# Patient Record
Sex: FEMALE | Race: WHITE | NOT HISPANIC OR LATINO | Employment: UNEMPLOYED | URBAN - NONMETROPOLITAN AREA
[De-identification: names, ages, dates, MRNs, and addresses within clinical notes are randomized per-mention and may not be internally consistent; named-entity substitution may affect disease eponyms.]

---

## 2017-03-20 ENCOUNTER — OFFICE VISIT (OUTPATIENT)
Dept: CARDIOLOGY | Facility: CLINIC | Age: 77
End: 2017-03-20

## 2017-03-20 VITALS
BODY MASS INDEX: 33.64 KG/M2 | SYSTOLIC BLOOD PRESSURE: 136 MMHG | HEIGHT: 62 IN | DIASTOLIC BLOOD PRESSURE: 86 MMHG | WEIGHT: 182.8 LBS | OXYGEN SATURATION: 94 % | HEART RATE: 66 BPM

## 2017-03-20 DIAGNOSIS — E11.9 TYPE 2 DIABETES MELLITUS WITHOUT COMPLICATION, WITHOUT LONG-TERM CURRENT USE OF INSULIN (HCC): ICD-10-CM

## 2017-03-20 DIAGNOSIS — E55.9 VITAMIN D DEFICIENCY: ICD-10-CM

## 2017-03-20 DIAGNOSIS — E21.3 HYPERPARATHYROIDISM (HCC): ICD-10-CM

## 2017-03-20 DIAGNOSIS — E78.2 MIXED HYPERLIPIDEMIA: Primary | ICD-10-CM

## 2017-03-20 DIAGNOSIS — F03.90 SENILE DEMENTIA, WITHOUT BEHAVIORAL DISTURBANCE (HCC): ICD-10-CM

## 2017-03-20 DIAGNOSIS — E66.9 NON MORBID OBESITY, UNSPECIFIED OBESITY TYPE: ICD-10-CM

## 2017-03-20 DIAGNOSIS — Z12.11 COLON CANCER SCREENING: ICD-10-CM

## 2017-03-20 PROCEDURE — 99213 OFFICE O/P EST LOW 20 MIN: CPT | Performed by: INTERNAL MEDICINE

## 2017-03-20 RX ORDER — LEVOTHYROXINE SODIUM 0.03 MG/1
25 TABLET ORAL DAILY
Qty: 90 TABLET | Refills: 2 | Status: SHIPPED | OUTPATIENT
Start: 2017-03-20 | End: 2021-12-17

## 2017-03-20 RX ORDER — ATORVASTATIN CALCIUM 80 MG/1
40 TABLET, FILM COATED ORAL DAILY
Qty: 90 TABLET | Refills: 2 | Status: SHIPPED | OUTPATIENT
Start: 2017-03-20 | End: 2021-12-17

## 2017-03-20 RX ORDER — IBUPROFEN/PSEUDOEPHEDRINE HCL 200MG-30MG
1 TABLET ORAL NIGHTLY
COMMUNITY
End: 2021-12-17

## 2017-03-20 RX ORDER — ESCITALOPRAM OXALATE 10 MG/1
10 TABLET ORAL DAILY
Qty: 90 TABLET | Refills: 0 | Status: SHIPPED | OUTPATIENT
Start: 2017-03-20 | End: 2017-03-28

## 2017-03-20 NOTE — PROGRESS NOTES
"subjective     Chief Complaint   Patient presents with   • Insomnia     \"at times days at a time\"   • Hyperlipidemia   • Diabetes   • left hip pain     pt fell 7 days ago      History of Present Illness  Hyperlipidemia  Luz Beasley has long-standing history of hyperlipidemia. Has been trying to lose weight and trying to follow diet and activity recommandations. Patient is tolerating medications very well. There has been no side effects. Latest lipid levels have been fluctuating.    Diabetes mellitus  Patient is eating more appetite is significantly better. Cardiac the family she is constantly eating. She is not following diet very well.  Sugar is running high around 150.     Weakness and fatigue  Patient complains of being tiredness and fatigue.  Family feels that she was recently started on a new psychiatric medications Brilinta 5 mg daily.  They feel the Zoloft was better than 1 to go back on it however they were advised to increase Brilinta 10 mg daily.    Hypertension  Last visit patient had low blood pressure.  Blood pressure medications were discontinued now blood pressure is very good is running around 1:30 to 140 systolic.    Dementia  Patient has senile dementia and confusion.  She is taking Namenda xr 28 and Aricept 10 mg daily.  There has been no significant change.  Next visit will change to namzaric     Patient Active Problem List   Diagnosis   • Diabetes mellitus*   • Hyperlipemia*   • Senile dementia*   • Vitamin D deficiency*   • Obesity*   • Hyperparathyroidism status post parathyroidectomy*       Social History   Substance Use Topics   • Smoking status: Never Smoker   • Smokeless tobacco: Never Used   • Alcohol use No       No Known Allergies      Current Outpatient Prescriptions:   •  atorvastatin (LIPITOR) 80 MG tablet, Take 0.5 tablets by mouth Daily., Disp: 90 tablet, Rfl: 2  •  clonazePAM (KlonoPIN) 0.5 MG tablet, take 1 tablet by mouth twice a day if needed, Disp: 60 tablet, Rfl: 0  •  " "donepezil (ARICEPT) 10 MG tablet, Take 10 mg by mouth every night., Disp: , Rfl:   •  escitalopram (LEXAPRO) 10 MG tablet, Take 1 tablet by mouth Daily., Disp: 90 tablet, Rfl: 0  •  levothyroxine (LEVOTHROID) 25 MCG tablet, Take 1 tablet by mouth Daily., Disp: 90 tablet, Rfl: 2  •  Melatonin 3 MG tablet dispersible, Take  by mouth Every Night., Disp: , Rfl:   •  memantine (NAMENDA XR) 28 MG capsule sustained-release 24 hr extended release capsule, Take 28 mg by mouth daily., Disp: , Rfl:   •  metFORMIN (GLUCOPHAGE) 500 MG tablet, Take 0.5 tablets by mouth Daily With Breakfast., Disp: 45 tablet, Rfl: 2  •  Nystatin powder, 1 application 3 (Three) Times a Day., Disp: 1 bottle, Rfl: 2  •  Vortioxetine HBr 10 MG tablet, Take 1/2 tab by mouth daily, Disp: 15 tablet, Rfl: 0      The following portions of the patient's history were reviewed and updated as appropriate: allergies, current medications, past family history, past medical history, past social history, past surgical history and problem list.    Review of Systems   Constitution: Positive for weakness and malaise/fatigue.   Eyes: Negative.    Cardiovascular: Negative.    Respiratory: Negative.    Skin: Negative.    Musculoskeletal: Positive for muscle weakness.   Gastrointestinal: Negative.    Genitourinary: Negative.    Neurological: Positive for dizziness.   Psychiatric/Behavioral: Positive for hallucinations. The patient has insomnia and is nervous/anxious.           Objective:     Visit Vitals   • /86 (BP Location: Left arm, Patient Position: Sitting)   • Pulse 66   • Ht 62\" (157.5 cm)   • Wt 182 lb 12.8 oz (82.9 kg)   • SpO2 94%   • BMI 33.43 kg/m2     Physical Exam   Constitutional: She appears well-developed and well-nourished.   HENT:   Head: Normocephalic and atraumatic.   Mouth/Throat: Oropharynx is clear and moist.   Eyes: Conjunctivae and EOM are normal. Pupils are equal, round, and reactive to light. No scleral icterus.   Neck: Normal range of " motion. Neck supple. No JVD present. No tracheal deviation present. No thyromegaly present.   Cardiovascular: Normal rate, regular rhythm, normal heart sounds and intact distal pulses.  Exam reveals no friction rub.    No murmur heard.  Pulmonary/Chest: Effort normal and breath sounds normal. No respiratory distress. She has no wheezes. She has no rales. She exhibits no tenderness.   Abdominal: Soft. Bowel sounds are normal. She exhibits no distension and no mass. There is no tenderness. There is no rebound and no guarding.   Musculoskeletal: Normal range of motion. She exhibits no edema, tenderness or deformity.   Lymphadenopathy:     She has no cervical adenopathy.   Neurological: She is alert. She has normal reflexes. No cranial nerve deficit. She exhibits normal muscle tone. Coordination normal.   Skin: Skin is warm and dry.   Psychiatric: She has a normal mood and affect. Her behavior is normal. Judgment and thought content normal.         Lab Review  Lab Results   Component Value Date     12/20/2016    K 3.9 12/20/2016     12/20/2016    BUN 20 12/20/2016    CREATININE 1.07 12/20/2016    GLUCOSE 158 (H) 12/20/2016    CALCIUM 9.5 12/20/2016    ALT 14 11/22/2016    ALKPHOS 102 11/22/2016    LABIL2 1.3 (L) 11/22/2016     Lab Results   Component Value Date    CKTOTAL 140 11/22/2016     Lab Results   Component Value Date    WBC 12.64 (H) 11/22/2016    HGB 14.9 11/22/2016    HCT 45.5 11/22/2016     11/22/2016     No results found for: INR  No results found for: MG  Lab Results   Component Value Date    TSH 6.313 (H) 11/22/2016     No results found for: BNP  Lab Results   Component Value Date    CHLPL 116 03/23/2016     Lab Results   Component Value Date    CHOL 127 11/22/2016    TRIG 169 (H) 11/22/2016    HDL 36 (L) 11/22/2016    LDLCALC 57 11/22/2016    VLDL 33.8 11/22/2016    LDLHDL 1.59 11/22/2016         Procedures     I personally viewed and interpreted the patient's LAB data          Assessment:     1. Mixed hyperlipidemia    2. Colon cancer screening    3. Type 2 diabetes mellitus without complication, without long-term current use of insulin    4. Hyperparathyroidism status post parathyroidectomy*    5. Non morbid obesity, unspecified obesity type    6. Senile dementia, without behavioral disturbance    7. Vitamin D deficiency*          Plan:      Lipids are normal.  Patient is tolerating medications very well.  She has not had lab work since November lab work will be arranged.    Sugar is very good according to the family however it was high in November.  Risk factor modification and healthy lifestyle was again discussed lab work has been scheduled.    Patient has not had a colonoscopy she wants to have it done now and will arrange consultation with Dr. Petersen for colonoscopy.    Weight loss was stressed.    Patient's daughter states that she gets confused has some hallucinations.  Apparently she was started on brilinta 5 mg daily.  Patient was given samples and was advised to increase it to 10 mg daily.  Follow-up scheduled        Return in about 3 months (around 6/20/2017).

## 2017-03-28 ENCOUNTER — OFFICE VISIT (OUTPATIENT)
Dept: PSYCHIATRY | Facility: CLINIC | Age: 77
End: 2017-03-28

## 2017-03-28 VITALS
HEART RATE: 88 BPM | SYSTOLIC BLOOD PRESSURE: 139 MMHG | BODY MASS INDEX: 33.13 KG/M2 | WEIGHT: 180 LBS | DIASTOLIC BLOOD PRESSURE: 83 MMHG | HEIGHT: 62 IN

## 2017-03-28 DIAGNOSIS — F03.90 DEMENTIA WITHOUT BEHAVIORAL DISTURBANCE, UNSPECIFIED DEMENTIA TYPE: ICD-10-CM

## 2017-03-28 DIAGNOSIS — F33.1 MAJOR DEPRESSIVE DISORDER, RECURRENT EPISODE, MODERATE (HCC): Primary | ICD-10-CM

## 2017-03-28 PROCEDURE — 99213 OFFICE O/P EST LOW 20 MIN: CPT | Performed by: NURSE PRACTITIONER

## 2017-03-28 RX ORDER — CLONAZEPAM 0.5 MG/1
0.5 TABLET ORAL 2 TIMES DAILY PRN
Qty: 60 TABLET | Refills: 0 | Status: SHIPPED | OUTPATIENT
Start: 2017-03-28 | End: 2017-05-23 | Stop reason: SDUPTHER

## 2017-03-28 NOTE — PROGRESS NOTES
Subjective   Luz Beasley is a 76 y.o. female is here today for medication management follow-up at the [facility], she presents to her appointment on time.  She presents with her granddaughter who gives a lot of information.     Chief Complaint:       History of Present Illness  She states that she has not been sleeping, been up for a long period of time, going on 30 hours with no sleep.  She states that her depression has been more severe lately, saw her PCP who recommended an increase in the Viibryd.  She was taking clonazepam to assist with panic attacks, she has not had a RX for a couple of months.  Granddaughter states that she has a prolonged panic about 3 weeks ago, it was also helpful for sleep.  Therefore, will restart the klonopin.  She is beginning to act out, throwing plates at people.  Patient does not remember any episodes of acting out.  She reports that her appetite is good.  She denies any new stressors.  Denies any new health issues.  Denies any AV hallucinations, denies any SI/HI.      The following portions of the patient's history were reviewed and updated as appropriate: allergies, current medications, past family history, past medical history, past social history, past surgical history and problem list.    Review of Systems   Constitutional: Negative for appetite change, chills, diaphoresis, fatigue, fever and unexpected weight change.   HENT: Negative for hearing loss, sore throat, trouble swallowing and voice change.    Eyes: Negative for photophobia and visual disturbance.   Respiratory: Negative for cough, chest tightness and shortness of breath.    Cardiovascular: Negative for chest pain and palpitations.   Gastrointestinal: Negative for abdominal pain, constipation, nausea and vomiting.   Endocrine: Negative for cold intolerance and heat intolerance.   Genitourinary: Negative for dysuria and frequency.   Musculoskeletal: Positive for gait problem. Negative for arthralgias, back  "pain, joint swelling and neck stiffness.   Skin: Negative for color change and wound.   Allergic/Immunologic: Negative for environmental allergies and immunocompromised state.   Neurological: Negative for dizziness, tremors, seizures, syncope, weakness, light-headedness and headaches.   Hematological: Negative for adenopathy. Does not bruise/bleed easily.       Objective   Physical Exam   Constitutional: She appears well-developed and well-nourished. No distress.   Neurological: She is alert. Coordination and gait normal.   Vitals reviewed.    Blood pressure 139/83, pulse 88, height 62\" (157.5 cm), weight 180 lb (81.6 kg).    Medication List:   Current Outpatient Prescriptions   Medication Sig Dispense Refill   • atorvastatin (LIPITOR) 80 MG tablet Take 0.5 tablets by mouth Daily. 90 tablet 2   • clonazePAM (KlonoPIN) 0.5 MG tablet Take 1 tablet by mouth 2 (Two) Times a Day As Needed for Anxiety. 60 tablet 0   • donepezil (ARICEPT) 10 MG tablet Take 10 mg by mouth every night.     • levothyroxine (LEVOTHROID) 25 MCG tablet Take 1 tablet by mouth Daily. 90 tablet 2   • Melatonin 3 MG tablet dispersible Take  by mouth Every Night.     • memantine (NAMENDA XR) 28 MG capsule sustained-release 24 hr extended release capsule Take 28 mg by mouth daily.     • metFORMIN (GLUCOPHAGE) 500 MG tablet Take 0.5 tablets by mouth Daily With Breakfast. 45 tablet 2   • Nystatin powder 1 application 3 (Three) Times a Day. 1 bottle 2   • Vortioxetine HBr 10 MG tablet Take 10 mg by mouth Daily. 30 tablet 0     No current facility-administered medications for this visit.        Mental Status Exam:   Hygiene:   fair  Cooperation:  Cooperative  Eye Contact:  Fair  Psychomotor Behavior:  Appropriate  Affect:  Blunted  Hopelessness: Denies  Speech:  Minimal  Thought Process:  Goal directed and Linear  Thought Content:  Normal  Suicidal:  None  Homicidal:  None  Hallucinations:  None  Delusion:  None  Memory:  Intact  Orientation:  Person, " Place, Time and Situation  Reliability:  fair  Insight:  Fair  Judgement:  Fair  Impulse Control:  Fair  Physical/Medical Issues:  No     Assessment/Plan   Diagnoses and all orders for this visit:    Major depressive disorder, recurrent episode, moderate    Dementia without behavioral disturbance, unspecified dementia type    Other orders  -     Vortioxetine HBr 10 MG tablet; Take 10 mg by mouth Daily.  -     clonazePAM (KlonoPIN) 0.5 MG tablet; Take 1 tablet by mouth 2 (Two) Times a Day As Needed for Anxiety.        Discussed medication options. Increase vortioxetine and begin klonopin for anxiety.   Reviewed the risks, benefits, and side effects of the medications; patient acknowledged and verbally consented.  Patient is agreeable to call the [facility].  Patient is aware to call 911 or go to the nearest ER should begin having SI/HI.     Return in 8 weeks.

## 2017-05-23 ENCOUNTER — OFFICE VISIT (OUTPATIENT)
Dept: PSYCHIATRY | Facility: CLINIC | Age: 77
End: 2017-05-23

## 2017-05-23 VITALS
WEIGHT: 183 LBS | HEIGHT: 62 IN | HEART RATE: 75 BPM | SYSTOLIC BLOOD PRESSURE: 120 MMHG | BODY MASS INDEX: 33.68 KG/M2 | DIASTOLIC BLOOD PRESSURE: 75 MMHG

## 2017-05-23 DIAGNOSIS — F03.90 DEMENTIA WITHOUT BEHAVIORAL DISTURBANCE, UNSPECIFIED DEMENTIA TYPE: ICD-10-CM

## 2017-05-23 DIAGNOSIS — F33.1 MAJOR DEPRESSIVE DISORDER, RECURRENT EPISODE, MODERATE (HCC): Primary | ICD-10-CM

## 2017-05-23 PROCEDURE — 99213 OFFICE O/P EST LOW 20 MIN: CPT | Performed by: NURSE PRACTITIONER

## 2017-05-23 RX ORDER — PREDNISOLONE ACETATE 10 MG/ML
SUSPENSION/ DROPS OPHTHALMIC
COMMUNITY
Start: 2017-05-22 | End: 2017-05-23

## 2017-05-23 RX ORDER — MEMANTINE HYDROCHLORIDE AND DONEPEZIL HYDROCHLORIDE 28; 10 MG/1; MG/1
1 CAPSULE ORAL NIGHTLY
COMMUNITY
Start: 2017-04-11

## 2017-05-23 RX ORDER — CLONAZEPAM 0.5 MG/1
0.5 TABLET ORAL 2 TIMES DAILY PRN
Qty: 60 TABLET | Refills: 0 | Status: SHIPPED | OUTPATIENT
Start: 2017-05-23 | End: 2017-06-30 | Stop reason: SDUPTHER

## 2017-06-09 ENCOUNTER — LAB (OUTPATIENT)
Dept: LAB | Facility: HOSPITAL | Age: 77
End: 2017-06-09
Attending: INTERNAL MEDICINE

## 2017-06-09 DIAGNOSIS — E78.2 MIXED HYPERLIPIDEMIA: ICD-10-CM

## 2017-06-09 LAB
BASOPHILS # BLD AUTO: 0.02 10*3/MM3 (ref 0–0.3)
BASOPHILS NFR BLD AUTO: 0.3 % (ref 0–2)
DEPRECATED RDW RBC AUTO: 53.9 FL (ref 37–54)
EOSINOPHIL # BLD AUTO: 0.23 10*3/MM3 (ref 0–0.7)
EOSINOPHIL NFR BLD AUTO: 3.2 % (ref 0–7)
ERYTHROCYTE [DISTWIDTH] IN BLOOD BY AUTOMATED COUNT: 16.6 % (ref 11.5–14.5)
HCT VFR BLD AUTO: 42.7 % (ref 37–47)
HGB BLD-MCNC: 13.4 G/DL (ref 12–16)
IMM GRANULOCYTES # BLD: 0.01 10*3/MM3 (ref 0–0.03)
IMM GRANULOCYTES NFR BLD: 0.1 % (ref 0–0.5)
LYMPHOCYTES # BLD AUTO: 2.7 10*3/MM3 (ref 1–3)
LYMPHOCYTES NFR BLD AUTO: 37.9 % (ref 16–46)
MCH RBC QN AUTO: 28.9 PG (ref 27–33)
MCHC RBC AUTO-ENTMCNC: 31.4 G/DL (ref 33–37)
MCV RBC AUTO: 92.2 FL (ref 80–94)
MONOCYTES # BLD AUTO: 0.39 10*3/MM3 (ref 0.1–0.9)
MONOCYTES NFR BLD AUTO: 5.5 % (ref 0–12)
NEUTROPHILS # BLD AUTO: 3.78 10*3/MM3 (ref 1.4–6.5)
NEUTROPHILS NFR BLD AUTO: 53 % (ref 40–75)
PLATELET # BLD AUTO: 199 10*3/MM3 (ref 130–400)
PMV BLD AUTO: 11.2 FL (ref 6–10)
RBC # BLD AUTO: 4.63 10*6/MM3 (ref 4.2–5.4)
WBC NRBC COR # BLD: 7.13 10*3/MM3 (ref 4.5–12.5)

## 2017-06-09 PROCEDURE — 85025 COMPLETE CBC W/AUTO DIFF WBC: CPT | Performed by: INTERNAL MEDICINE

## 2017-06-09 RX ORDER — LISINOPRIL 40 MG/1
TABLET ORAL
Qty: 90 TABLET | Refills: 1 | Status: SHIPPED | OUTPATIENT
Start: 2017-06-09 | End: 2017-10-17

## 2017-06-12 ENCOUNTER — OFFICE VISIT (OUTPATIENT)
Dept: CARDIOLOGY | Facility: CLINIC | Age: 77
End: 2017-06-12

## 2017-06-12 VITALS
DIASTOLIC BLOOD PRESSURE: 70 MMHG | OXYGEN SATURATION: 97 % | BODY MASS INDEX: 33.87 KG/M2 | SYSTOLIC BLOOD PRESSURE: 118 MMHG | HEART RATE: 72 BPM | WEIGHT: 185.2 LBS

## 2017-06-12 DIAGNOSIS — I10 ESSENTIAL HYPERTENSION: ICD-10-CM

## 2017-06-12 DIAGNOSIS — R26.89 LOSS OF BALANCE: ICD-10-CM

## 2017-06-12 DIAGNOSIS — M19.90 ARTHRITIS: ICD-10-CM

## 2017-06-12 DIAGNOSIS — E03.9 ACQUIRED HYPOTHYROIDISM: ICD-10-CM

## 2017-06-12 DIAGNOSIS — E21.3 HYPERPARATHYROIDISM (HCC): ICD-10-CM

## 2017-06-12 DIAGNOSIS — E11.9 TYPE 2 DIABETES MELLITUS WITHOUT COMPLICATION, WITHOUT LONG-TERM CURRENT USE OF INSULIN (HCC): ICD-10-CM

## 2017-06-12 DIAGNOSIS — E66.9 NON MORBID OBESITY, UNSPECIFIED OBESITY TYPE: ICD-10-CM

## 2017-06-12 DIAGNOSIS — F03.90 SENILE DEMENTIA, WITHOUT BEHAVIORAL DISTURBANCE (HCC): ICD-10-CM

## 2017-06-12 DIAGNOSIS — E78.2 MIXED HYPERLIPIDEMIA: Primary | ICD-10-CM

## 2017-06-12 DIAGNOSIS — F41.9 ANXIETY: ICD-10-CM

## 2017-06-12 DIAGNOSIS — E55.9 VITAMIN D DEFICIENCY: ICD-10-CM

## 2017-06-12 LAB
ALBUMIN SERPL-MCNC: 4.2 G/DL (ref 3.4–4.8)
ALBUMIN/GLOB SERPL: 1.2 G/DL (ref 1.5–2.5)
ALP SERPL-CCNC: 113 U/L (ref 35–104)
ALT SERPL W P-5'-P-CCNC: 19 U/L (ref 10–36)
ANION GAP SERPL CALCULATED.3IONS-SCNC: 5 MMOL/L (ref 3.6–11.2)
AST SERPL-CCNC: 28 U/L (ref 10–30)
BILIRUB SERPL-MCNC: 1.4 MG/DL (ref 0.2–1.8)
BUN BLD-MCNC: 18 MG/DL (ref 7–21)
BUN/CREAT SERPL: 14.6 (ref 7–25)
CALCIUM SPEC-SCNC: 10.1 MG/DL (ref 7.7–10)
CHLORIDE SERPL-SCNC: 106 MMOL/L (ref 99–112)
CHOLEST SERPL-MCNC: 122 MG/DL (ref 0–200)
CK SERPL-CCNC: 76 U/L (ref 24–173)
CO2 SERPL-SCNC: 34 MMOL/L (ref 24.3–31.9)
CREAT BLD-MCNC: 1.23 MG/DL (ref 0.43–1.29)
GFR SERPL CREATININE-BSD FRML MDRD: 42 ML/MIN/1.73
GLOBULIN UR ELPH-MCNC: 3.4 GM/DL
GLUCOSE BLD-MCNC: 143 MG/DL (ref 70–110)
HBA1C MFR BLD: 6.2 % (ref 4.5–5.7)
HDLC SERPL-MCNC: 40 MG/DL (ref 60–100)
LDLC SERPL CALC-MCNC: 63 MG/DL (ref 0–100)
LDLC/HDLC SERPL: 1.57 {RATIO}
OSMOLALITY SERPL CALC.SUM OF ELEC: 293.1 MOSM/KG (ref 273–305)
POTASSIUM BLD-SCNC: 4.3 MMOL/L (ref 3.5–5.3)
PROT SERPL-MCNC: 7.6 G/DL (ref 6–8)
SODIUM BLD-SCNC: 145 MMOL/L (ref 135–153)
T4 FREE SERPL-MCNC: 1.16 NG/DL (ref 0.89–1.76)
TRIGL SERPL-MCNC: 97 MG/DL (ref 0–150)
TSH SERPL DL<=0.05 MIU/L-ACNC: 1.2 MIU/ML (ref 0.55–4.78)
VLDLC SERPL-MCNC: 19.4 MG/DL

## 2017-06-12 PROCEDURE — 84443 ASSAY THYROID STIM HORMONE: CPT | Performed by: INTERNAL MEDICINE

## 2017-06-12 PROCEDURE — 84439 ASSAY OF FREE THYROXINE: CPT | Performed by: INTERNAL MEDICINE

## 2017-06-12 PROCEDURE — 82550 ASSAY OF CK (CPK): CPT | Performed by: INTERNAL MEDICINE

## 2017-06-12 PROCEDURE — 83036 HEMOGLOBIN GLYCOSYLATED A1C: CPT | Performed by: INTERNAL MEDICINE

## 2017-06-12 PROCEDURE — 80061 LIPID PANEL: CPT | Performed by: INTERNAL MEDICINE

## 2017-06-12 PROCEDURE — 99214 OFFICE O/P EST MOD 30 MIN: CPT | Performed by: INTERNAL MEDICINE

## 2017-06-12 PROCEDURE — 80053 COMPREHEN METABOLIC PANEL: CPT | Performed by: INTERNAL MEDICINE

## 2017-06-12 RX ORDER — PREDNISOLONE ACETATE 10 MG/ML
SUSPENSION/ DROPS OPHTHALMIC
Refills: 0 | COMMUNITY
Start: 2017-06-05 | End: 2017-09-07

## 2017-06-12 NOTE — PROGRESS NOTES
subjective     Chief Complaint   Patient presents with   • Follow-up   • Hyperlipidemia   • Hypertension   • Dementia     History of Present Illness    Hyperlipidemia  Luz Beasley has long-standing history of hyperlipidemia. Has been trying to lose weight and trying to follow diet and activity recommandations. Patient is tolerating medications very well. There has been no side effects. Latest lipid levels have been fluctuating.     Diabetes mellitus  Patient is eating more appetite is significantly better. Cardiac the family she is constantly eating. She is not following diet very well.  Sugar is running high around 150.      Weakness and fatigue  Patient complains of being tiredness and fatigue. Family feels that she was recently started on a new psychiatric medications Brilinta 5 mg daily.  They feel the Zoloft was better than 1 to go back on it however they were advised to increase Brilinta 10 mg daily.     Hypertension  Last visit patient had low blood pressure. Blood pressure medications were discontinued now blood pressure is very good is running around 1:30 to 140 systolic.     Dementia  Patient has senile dementia and confusion. She is taking Namenda xr 28 and Aricept 10 mg daily. There has been no significant change. Next visit will change to [facility]      Past Surgical History:   Procedure Laterality Date   • COLONOSCOPY  03/21/2008   • HYSTERECTOMY     • THYROID SURGERY       Family History   Problem Relation Age of Onset   • Family history unknown: Yes     Past Medical History:   Diagnosis Date   • Dementia    • Depression    • Diabetes mellitus    • Hyperlipidemia    • Hypertension    • Obesity    • Senile dementia    • Vitamin D deficiency disease      Patient Active Problem List   Diagnosis   • Diabetes mellitus*   • Hyperlipemia*   • Senile dementia*   • Vitamin D deficiency*   • Essential hypertension   • Obesity*   • Hyperparathyroidism status post parathyroidectomy*   • Arthritis   • Loss of  balance   • Acquired hypothyroidism   • Anxiety       Social History   Substance Use Topics   • Smoking status: Never Smoker   • Smokeless tobacco: Never Used   • Alcohol use No       No Known Allergies    Current Outpatient Prescriptions on File Prior to Visit   Medication Sig   • atorvastatin (LIPITOR) 80 MG tablet Take 0.5 tablets by mouth Daily.   • clonazePAM (KlonoPIN) 0.5 MG tablet Take 1 tablet by mouth 2 (Two) Times a Day As Needed for Anxiety.   • levothyroxine (LEVOTHROID) 25 MCG tablet Take 1 tablet by mouth Daily.   • lisinopril (PRINIVIL,ZESTRIL) 40 MG tablet TAKE ONE-HALF (1/2) TABLET TWICE A DAY   • Melatonin 3 MG tablet dispersible Take  by mouth Every Night.   • metFORMIN (GLUCOPHAGE) 500 MG tablet Take 0.5 tablets by mouth Daily With Breakfast.   • NAMZARIC 28-10 MG capsule sustained-release 24 hr    • Nystatin powder 1 application 3 (Three) Times a Day.   • Vortioxetine HBr 10 MG tablet Take 10 mg by mouth Daily.   • donepezil (ARICEPT) 10 MG tablet Take 10 mg by mouth every night.   • memantine (NAMENDA XR) 28 MG capsule sustained-release 24 hr extended release capsule Take 28 mg by mouth daily.     No current facility-administered medications on file prior to visit.          The following portions of the patient's history were reviewed and updated as appropriate: allergies, current medications, past family history, past medical history, past social history, past surgical history and problem list.    Review of Systems   Constitution: Positive for weakness and malaise/fatigue.   HENT: Negative.    Eyes: Negative.    Cardiovascular: Negative.    Endocrine: Negative.    Skin: Negative.    Musculoskeletal: Positive for falls, myalgias and stiffness.   Gastrointestinal: Negative.    Genitourinary: Negative.    Neurological: Positive for dizziness.          Objective:     /70 (BP Location: Left arm, Patient Position: Sitting)  Pulse 72  Wt 185 lb 3.2 oz (84 kg)  SpO2 97%  BMI 33.87  kg/m2  Physical Exam   Constitutional: She appears well-developed and well-nourished.   Patient is using a walker.  She is awake and alert.  She is still confused but more communicative and interacting   HENT:   Head: Normocephalic and atraumatic.   Mouth/Throat: Oropharynx is clear and moist.   Eyes: Conjunctivae and EOM are normal. Pupils are equal, round, and reactive to light. No scleral icterus.   Neck: Normal range of motion. Neck supple. No JVD present. No tracheal deviation present. No thyromegaly present.   Cardiovascular: Normal rate, regular rhythm, normal heart sounds and intact distal pulses.  Exam reveals no friction rub.    No murmur heard.  Pulmonary/Chest: Effort normal and breath sounds normal. No respiratory distress. She has no wheezes. She has no rales. She exhibits no tenderness.   Abdominal: Soft. Bowel sounds are normal. She exhibits no distension and no mass. There is no tenderness. There is no rebound and no guarding.   Musculoskeletal: Normal range of motion. She exhibits no edema, tenderness or deformity.   Lymphadenopathy:     She has no cervical adenopathy.   Neurological: She is alert. She has normal reflexes. No cranial nerve deficit. She exhibits normal muscle tone. Coordination normal.   Skin: Skin is warm and dry.   Psychiatric: She has a normal mood and affect. Her behavior is normal. Judgment and thought content normal.         Lab Review  Lab Results   Component Value Date     06/12/2017    K 4.3 06/12/2017     06/12/2017    BUN 18 06/12/2017    CREATININE 1.23 06/12/2017    GLUCOSE 143 (H) 06/12/2017    CALCIUM 10.1 (H) 06/12/2017    ALT 19 06/12/2017    ALKPHOS 113 (H) 06/12/2017    LABIL2 1.2 (L) 06/12/2017     Lab Results   Component Value Date    CKTOTAL 76 06/12/2017     Lab Results   Component Value Date    WBC 7.13 06/09/2017    HGB 13.4 06/09/2017    HCT 42.7 06/09/2017     06/09/2017     No results found for: INR  No results found for: MG  Lab  Results   Component Value Date    TSH 1.199 06/12/2017     No results found for: BNP  Lab Results   Component Value Date    CHOL 122 06/12/2017    CHLPL 116 03/23/2016    TRIG 97 06/12/2017    HDL 40 (L) 06/12/2017    LDLCALC 63 06/12/2017    VLDL 19.4 06/12/2017    LDLHDL 1.57 06/12/2017         Procedures       I personally viewed and interpreted the patient's LAB data         Assessment:     1. Mixed hyperlipidemia    2. Vitamin D deficiency*    3. Non morbid obesity, unspecified obesity type    4. Hyperparathyroidism status post parathyroidectomy*    5. Type 2 diabetes mellitus without complication, without long-term current use of insulin    6. Senile dementia, without behavioral disturbance    7. Arthritis    8. Loss of balance    9. Acquired hypothyroidism    10. Essential hypertension    11. Anxiety          Plan:   Patient is moving in with her grand daughter.  There is requested hospital bed because patient gets very confused and tendency to fall at a bed.  Patient will be living upstairs.  She cannot climb stairs actually is very hard for her to walk without assistance.  She uses a walker for balance.  There also requested a chair for the stairs to carry her upstairs.  They will give me information and then we will request that.    Overall patient seems to be doing much better is more awake and alert and seems to be more happy and interacting.  Refills of medications were given.    Blood pressure is very well controlled.  Cognitive functions have improved she will continue namzaric 28/10    She will also continue current dose of Lipitor for hyperlipidemia and Synthroid for hypothyroidism.           Return in about 3 months (around 9/12/2017).

## 2017-07-03 RX ORDER — ESCITALOPRAM OXALATE 10 MG/1
TABLET ORAL
Qty: 90 TABLET | Refills: 0 | Status: SHIPPED | OUTPATIENT
Start: 2017-07-03 | End: 2017-08-15

## 2017-07-05 ENCOUNTER — TELEPHONE (OUTPATIENT)
Dept: PSYCHIATRY | Facility: CLINIC | Age: 77
End: 2017-07-05

## 2017-07-05 RX ORDER — CLONAZEPAM 0.5 MG/1
TABLET ORAL
Qty: 60 TABLET | Refills: 0 | Status: SHIPPED | OUTPATIENT
Start: 2017-07-05 | End: 2017-08-08 | Stop reason: SDUPTHER

## 2017-08-08 RX ORDER — CLONAZEPAM 0.5 MG/1
0.5 TABLET ORAL 2 TIMES DAILY PRN
Qty: 60 TABLET | Refills: 0 | Status: SHIPPED | OUTPATIENT
Start: 2017-08-08 | End: 2017-08-15 | Stop reason: SDUPTHER

## 2017-08-15 ENCOUNTER — OFFICE VISIT (OUTPATIENT)
Dept: PSYCHIATRY | Facility: CLINIC | Age: 77
End: 2017-08-15

## 2017-08-15 VITALS — HEIGHT: 62 IN | BODY MASS INDEX: 34.04 KG/M2 | WEIGHT: 185 LBS

## 2017-08-15 DIAGNOSIS — F33.1 MAJOR DEPRESSIVE DISORDER, RECURRENT EPISODE, MODERATE (HCC): Primary | ICD-10-CM

## 2017-08-15 DIAGNOSIS — F03.90 DEMENTIA WITHOUT BEHAVIORAL DISTURBANCE, UNSPECIFIED DEMENTIA TYPE: ICD-10-CM

## 2017-08-15 PROCEDURE — 99213 OFFICE O/P EST LOW 20 MIN: CPT | Performed by: NURSE PRACTITIONER

## 2017-08-15 RX ORDER — CLONAZEPAM 0.5 MG/1
0.5 TABLET ORAL 2 TIMES DAILY PRN
Qty: 60 TABLET | Refills: 0 | Status: SHIPPED | OUTPATIENT
Start: 2017-08-15 | End: 2017-09-07 | Stop reason: SDUPTHER

## 2017-08-15 NOTE — PROGRESS NOTES
Subjective   Luz Beasley is a 77 y.o. female is here today for medication management follow-up at the [facility], she presents to her appointment on time.  She presents with her daughter with whom she is currently taking care of.      Chief Complaint: Follow-up for depression     History of Present Illness  She states that she has been really tired recently, recent mood changes but mostly associated with changes in environment.  Granddaughter accompanies patients and states that she has been tired, but then is happy for several days then depressed.  She states that she has a headache today but denies any other issues.  She denies feeling sad but states that she is tired.  She states that she really doesn't know how her nerves are, but it was reported that she has been jumping with loud sounds.  She states that she is sleeping good with no NM- however granddaughter reports that she yells out for her  often.  Appetite is good with no weight changes.  Denies any SI/HI, denies any AV hallucinations.      The following portions of the patient's history were reviewed and updated as appropriate: allergies, current medications, past family history, past medical history, past social history, past surgical history and problem list.    Review of Systems   Constitutional: Negative for appetite change, chills, diaphoresis, fatigue, fever and unexpected weight change.   HENT: Negative for hearing loss, sore throat, trouble swallowing and voice change.    Eyes: Negative for photophobia and visual disturbance.   Respiratory: Negative for cough, chest tightness and shortness of breath.    Cardiovascular: Negative for chest pain and palpitations.   Gastrointestinal: Negative for abdominal pain, constipation, nausea and vomiting.   Endocrine: Negative for cold intolerance and heat intolerance.   Genitourinary: Negative for dysuria and frequency.   Musculoskeletal: Positive for gait problem. Negative for arthralgias, back  "pain, joint swelling and neck stiffness.   Skin: Negative for color change and wound.   Allergic/Immunologic: Negative for environmental allergies and immunocompromised state.   Neurological: Negative for dizziness, tremors, seizures, syncope, weakness, light-headedness and headaches.   Hematological: Negative for adenopathy. Does not bruise/bleed easily.       Objective   Physical Exam   Constitutional: She appears well-developed and well-nourished. No distress.   Neurological: She is alert. Coordination and gait normal.   Vitals reviewed.    Height 62\" (157.5 cm), weight 185 lb (83.9 kg).    Medication List:   Current Outpatient Prescriptions   Medication Sig Dispense Refill   • atorvastatin (LIPITOR) 80 MG tablet Take 0.5 tablets by mouth Daily. 90 tablet 2   • clonazePAM (KlonoPIN) 0.5 MG tablet Take 1 tablet by mouth 2 (Two) Times a Day As Needed for Anxiety. 60 tablet 0   • donepezil (ARICEPT) 10 MG tablet Take 10 mg by mouth every night.     • levothyroxine (LEVOTHROID) 25 MCG tablet Take 1 tablet by mouth Daily. 90 tablet 2   • lisinopril (PRINIVIL,ZESTRIL) 40 MG tablet TAKE ONE-HALF (1/2) TABLET TWICE A DAY 90 tablet 1   • Melatonin 3 MG tablet dispersible Take  by mouth Every Night.     • memantine (NAMENDA XR) 28 MG capsule sustained-release 24 hr extended release capsule Take 28 mg by mouth daily.     • metFORMIN (GLUCOPHAGE) 500 MG tablet Take 0.5 tablets by mouth Daily With Breakfast. 45 tablet 2   • NAMZARIC 28-10 MG capsule sustained-release 24 hr      • Nystatin powder 1 application 3 (Three) Times a Day. 1 bottle 2   • prednisoLONE acetate (PRED FORTE) 1 % ophthalmic suspension instill 1 drop into both eyes four times a day  0   • Vortioxetine HBr 10 MG tablet Take 10 mg by mouth Daily. 30 tablet 2     No current facility-administered medications for this visit.        Mental Status Exam:   Hygiene:   fair  Cooperation:  Cooperative  Eye Contact:  Fair  Psychomotor Behavior:  Appropriate  Affect:  " Blunted  Hopelessness: Denies  Speech:  Minimal  Thought Process:  Goal directed and Linear  Thought Content:  Normal  Suicidal:  None  Homicidal:  None  Hallucinations:  None  Delusion:  None  Memory:  Intact  Orientation:  Person, Place, Time and Situation  Reliability:  fair  Insight:  Fair  Judgement:  Fair  Impulse Control:  Fair  Physical/Medical Issues:  No     Assessment/Plan   Diagnoses and all orders for this visit:    Major depressive disorder, recurrent episode, moderate    Dementia without behavioral disturbance, unspecified dementia type    Other orders  -     Vortioxetine HBr 10 MG tablet; Take 10 mg by mouth Daily.  -     clonazePAM (KlonoPIN) 0.5 MG tablet; Take 1 tablet by mouth 2 (Two) Times a Day As Needed for Anxiety.    Discussed medication options. Continue vortioxetine for depression,  and klonopin for anxiety.   Reviewed the risks, benefits, and side effects of the medications; patient acknowledged and verbally consented.  Patient is agreeable to call the [facility].  Patient is aware to call 911 or go to the nearest ER should begin having SI/HI.     Return in 12 weeks.

## 2017-09-07 ENCOUNTER — OFFICE VISIT (OUTPATIENT)
Dept: CARDIOLOGY | Facility: CLINIC | Age: 77
End: 2017-09-07

## 2017-09-07 VITALS
HEIGHT: 62 IN | HEART RATE: 79 BPM | BODY MASS INDEX: 34.96 KG/M2 | DIASTOLIC BLOOD PRESSURE: 96 MMHG | OXYGEN SATURATION: 94 % | WEIGHT: 190 LBS | SYSTOLIC BLOOD PRESSURE: 124 MMHG

## 2017-09-07 DIAGNOSIS — I10 ESSENTIAL HYPERTENSION: Primary | ICD-10-CM

## 2017-09-07 DIAGNOSIS — E03.9 ACQUIRED HYPOTHYROIDISM: ICD-10-CM

## 2017-09-07 DIAGNOSIS — E55.9 VITAMIN D DEFICIENCY: ICD-10-CM

## 2017-09-07 DIAGNOSIS — R53.83 FATIGUE, UNSPECIFIED TYPE: ICD-10-CM

## 2017-09-07 DIAGNOSIS — E66.9 NON MORBID OBESITY, UNSPECIFIED OBESITY TYPE: ICD-10-CM

## 2017-09-07 DIAGNOSIS — E78.2 MIXED HYPERLIPIDEMIA: ICD-10-CM

## 2017-09-07 DIAGNOSIS — F03.90 SENILE DEMENTIA, WITHOUT BEHAVIORAL DISTURBANCE (HCC): ICD-10-CM

## 2017-09-07 DIAGNOSIS — G44.029 CHRONIC CLUSTER HEADACHE, NOT INTRACTABLE: ICD-10-CM

## 2017-09-07 DIAGNOSIS — F41.9 ANXIETY: ICD-10-CM

## 2017-09-07 DIAGNOSIS — E11.9 TYPE 2 DIABETES MELLITUS WITHOUT COMPLICATION, WITHOUT LONG-TERM CURRENT USE OF INSULIN (HCC): ICD-10-CM

## 2017-09-07 PROBLEM — G44.009 CLUSTER HEADACHES: Status: ACTIVE | Noted: 2017-09-07

## 2017-09-07 PROCEDURE — 99214 OFFICE O/P EST MOD 30 MIN: CPT | Performed by: INTERNAL MEDICINE

## 2017-09-07 RX ORDER — CLONAZEPAM 0.5 MG/1
0.5 TABLET ORAL 2 TIMES DAILY PRN
Qty: 60 TABLET | Refills: 0 | Status: SHIPPED | OUTPATIENT
Start: 2017-09-07 | End: 2017-10-23 | Stop reason: HOSPADM

## 2017-09-07 RX ORDER — TOPIRAMATE 25 MG/1
25 TABLET ORAL 2 TIMES DAILY
Qty: 60 TABLET | Refills: 2 | Status: SHIPPED | OUTPATIENT
Start: 2017-09-07 | End: 2017-12-04 | Stop reason: ALTCHOICE

## 2017-09-07 RX ORDER — ESCITALOPRAM OXALATE 10 MG/1
10 TABLET ORAL NIGHTLY
COMMUNITY
End: 2017-12-04 | Stop reason: ALTCHOICE

## 2017-09-07 NOTE — PROGRESS NOTES
subjective     Chief Complaint   Patient presents with   • Follow-up   • Hyperlipidemia   • Balance Issues   • Headache   • Diabetes     History of Present Illness  Patient was brought in by her daughter for follow-up of chronic medical problems is one new problem of headache patient's has headaches almost all the times.  It goes on for days and then she gets better.  His been no nausea or vomiting.  It looks like cluster headache.    Hyperlipidemia  Luz Beasley has long-standing history of hyperlipidemia. Has been trying to lose weight and trying to follow diet and activity recommandations. Patient is tolerating medications very well. There has been no side effects. Latest lipid levels have been fluctuating.      Diabetes mellitus  Patient is eating more appetite is significantly better. Cardiac the family she is constantly eating. She is not following diet very well.  Sugar is running high around 150.    Hypertension  Last visit patient had low blood pressure. Blood pressure medications were discontinued now blood pressure is very good is running around 1:30 to 140 systolic.      Dementia  Patient has senile dementia and confusion. She is taking Namenda xr 28 and Aricept 10 mg daily. There has been no significant change. Next visit will change to [facility]      Past Surgical History:   Procedure Laterality Date   • CARDIAC CATHETERIZATION  05/05/2006   • CARDIOVASCULAR STRESS TEST  05/05/2006   • COLONOSCOPY  03/21/2008   • HYSTERECTOMY     • THYROID SURGERY       Family History   Problem Relation Age of Onset   • Family history unknown: Yes     Past Medical History:   Diagnosis Date   • Dementia    • Depression    • Diabetes mellitus    • Hyperlipidemia    • Hypertension    • Obesity    • Senile dementia    • Vitamin D deficiency disease      Patient Active Problem List   Diagnosis   • Diabetes mellitus*   • Hyperlipemia*   • Senile dementia*   • Vitamin D deficiency*   • Essential hypertension   • Obesity*   •  Hyperparathyroidism status post parathyroidectomy*   • Arthritis   • Loss of balance   • Acquired hypothyroidism   • Anxiety   • Cluster headaches       Social History   Substance Use Topics   • Smoking status: Never Smoker   • Smokeless tobacco: Never Used   • Alcohol use No       No Known Allergies    Current Outpatient Prescriptions on File Prior to Visit   Medication Sig   • atorvastatin (LIPITOR) 80 MG tablet Take 0.5 tablets by mouth Daily.   • levothyroxine (LEVOTHROID) 25 MCG tablet Take 1 tablet by mouth Daily.   • Melatonin 3 MG tablet dispersible Take  by mouth Every Night.   • metFORMIN (GLUCOPHAGE) 500 MG tablet Take 0.5 tablets by mouth Daily With Breakfast.   • NAMZARIC 28-10 MG capsule sustained-release 24 hr    • Nystatin powder 1 application 3 (Three) Times a Day.   • Vortioxetine HBr 10 MG tablet Take 10 mg by mouth Daily.   • [DISCONTINUED] clonazePAM (KlonoPIN) 0.5 MG tablet Take 1 tablet by mouth 2 (Two) Times a Day As Needed for Anxiety.   • lisinopril (PRINIVIL,ZESTRIL) 40 MG tablet TAKE ONE-HALF (1/2) TABLET TWICE A DAY   • [DISCONTINUED] donepezil (ARICEPT) 10 MG tablet Take 10 mg by mouth every night.   • [DISCONTINUED] memantine (NAMENDA XR) 28 MG capsule sustained-release 24 hr extended release capsule Take 28 mg by mouth daily.   • [DISCONTINUED] prednisoLONE acetate (PRED FORTE) 1 % ophthalmic suspension instill 1 drop into both eyes four times a day     No current facility-administered medications on file prior to visit.          The following portions of the patient's history were reviewed and updated as appropriate: allergies, current medications, past family history, past medical history, past social history, past surgical history and problem list.    Review of Systems   Constitution: Negative.        Review of systems performed through her daughter.  Patient herself cannot give any reliable history.   HENT: Positive for headaches. Negative for congestion.    Eyes: Negative.   "  Cardiovascular: Negative.  Negative for chest pain, cyanosis, dyspnea on exertion, irregular heartbeat, leg swelling, near-syncope, orthopnea, palpitations, paroxysmal nocturnal dyspnea and syncope.   Respiratory: Negative.  Negative for shortness of breath.    Hematologic/Lymphatic: Negative.    Musculoskeletal: Negative.    Gastrointestinal: Negative.           Objective:     /96  Pulse 79  Ht 62\" (157.5 cm)  Wt 190 lb (86.2 kg)  SpO2 94%  BMI 34.75 kg/m2  Physical Exam   Constitutional: She appears well-developed and well-nourished. No distress.   HENT:   Head: Normocephalic and atraumatic.   Mouth/Throat: Oropharynx is clear and moist. No oropharyngeal exudate.   Eyes: Conjunctivae and EOM are normal. Pupils are equal, round, and reactive to light. No scleral icterus.   Neck: Normal range of motion. Neck supple. No JVD present. No tracheal deviation present. No thyromegaly present.   Cardiovascular: Normal rate, regular rhythm, normal heart sounds and intact distal pulses.  PMI is not displaced.  Exam reveals no gallop, no friction rub and no decreased pulses.    No murmur heard.  Pulses:       Carotid pulses are 3+ on the right side, and 3+ on the left side.       Radial pulses are 3+ on the right side, and 3+ on the left side.   Pulmonary/Chest: Effort normal and breath sounds normal. No respiratory distress. She has no wheezes. She has no rales. She exhibits no tenderness.   Abdominal: Soft. Bowel sounds are normal. She exhibits no distension, no abdominal bruit and no mass. There is no splenomegaly or hepatomegaly. There is no tenderness. There is no rebound and no guarding.   Musculoskeletal: Normal range of motion. She exhibits no edema, tenderness or deformity.   Lymphadenopathy:     She has no cervical adenopathy.   Neurological: She is alert. She has normal reflexes. No cranial nerve deficit. She exhibits normal muscle tone. Coordination normal.   Skin: Skin is warm and dry. No rash noted. " She is not diaphoretic. No erythema.   Psychiatric: She has a normal mood and affect. Her behavior is normal.         Lab Review  Lab Results   Component Value Date     06/12/2017    K 4.3 06/12/2017     06/12/2017    BUN 18 06/12/2017    CREATININE 1.23 06/12/2017    GLUCOSE 143 (H) 06/12/2017    CALCIUM 10.1 (H) 06/12/2017    ALT 19 06/12/2017    ALKPHOS 113 (H) 06/12/2017    LABIL2 1.2 (L) 06/12/2017     Lab Results   Component Value Date    CKTOTAL 76 06/12/2017     Lab Results   Component Value Date    WBC 7.13 06/09/2017    HGB 13.4 06/09/2017    HCT 42.7 06/09/2017     06/09/2017     No results found for: INR  No results found for: MG  Lab Results   Component Value Date    TSH 1.199 06/12/2017     No results found for: BNP  Lab Results   Component Value Date    CHOL 122 06/12/2017    CHLPL 116 03/23/2016    TRIG 97 06/12/2017    HDL 40 (L) 06/12/2017    LDLCALC 63 06/12/2017    VLDL 19.4 06/12/2017    LDLHDL 1.57 06/12/2017         Procedures       I personally viewed and interpreted the patient's LAB data         Assessment:     1. Essential hypertension    2. Mixed hyperlipidemia    3. Non morbid obesity, unspecified obesity type    4. Vitamin D deficiency*    5. Acquired hypothyroidism    6. Type 2 diabetes mellitus without complication, without long-term current use of insulin    7. Senile dementia, without behavioral disturbance    8. Anxiety    9. Vitamin D deficiency    10. Fatigue, unspecified type    11. Chronic cluster headache, not intractable          Plan:      Patient cannot give history but her daughter states that she's been having severe headaches.  She follows with Dr. barry but she sees him only once a year.  Will start patient on Topamax 25 mg daily and if that does not help she will be willing to go back see Dr. Barry.    Patient has severe dementia but no behavior issues.    Blood pressure is very well controlled with current medications.    Hyperlipidemia has been  controlled.  Patient is taking Lipitor 80 mg daily.  Lab work will be checked next visit side effects of medications were discussed with the daughter.    Other problems consist of diabetes mellitus which is fairly well controlled with metformin and thyroid replacement therapy.    Refills of medications were given along with Topamax.    Lab work scheduled for next visit.          Return in about 3 months (around 12/7/2017).

## 2017-10-17 ENCOUNTER — APPOINTMENT (OUTPATIENT)
Dept: GENERAL RADIOLOGY | Facility: HOSPITAL | Age: 77
End: 2017-10-17

## 2017-10-17 ENCOUNTER — HOSPITAL ENCOUNTER (INPATIENT)
Facility: HOSPITAL | Age: 77
LOS: 5 days | Discharge: NURSING, REHAB OR LONG-TERM CARE | End: 2017-10-23
Attending: FAMILY MEDICINE | Admitting: INTERNAL MEDICINE

## 2017-10-17 DIAGNOSIS — R53.81 PHYSICAL DEBILITY: ICD-10-CM

## 2017-10-17 DIAGNOSIS — N30.00 ACUTE CYSTITIS WITHOUT HEMATURIA: Primary | ICD-10-CM

## 2017-10-17 DIAGNOSIS — F03.90 SENILE DEMENTIA WITHOUT BEHAVIORAL DISTURBANCE (HCC): ICD-10-CM

## 2017-10-17 PROBLEM — N39.0 UTI (URINARY TRACT INFECTION): Status: ACTIVE | Noted: 2017-10-17

## 2017-10-17 LAB
ALBUMIN SERPL-MCNC: 4.5 G/DL (ref 3.4–4.8)
ALBUMIN/GLOB SERPL: 1.2 G/DL (ref 1.5–2.5)
ALP SERPL-CCNC: 113 U/L (ref 35–104)
ALT SERPL W P-5'-P-CCNC: 23 U/L (ref 10–36)
ANION GAP SERPL CALCULATED.3IONS-SCNC: 10.1 MMOL/L (ref 3.6–11.2)
AST SERPL-CCNC: 30 U/L (ref 10–30)
BACTERIA UR QL AUTO: ABNORMAL /HPF
BASOPHILS # BLD AUTO: 0.03 10*3/MM3 (ref 0–0.3)
BASOPHILS NFR BLD AUTO: 0.2 % (ref 0–2)
BILIRUB SERPL-MCNC: 1.6 MG/DL (ref 0.2–1.8)
BILIRUB UR QL STRIP: NEGATIVE
BNP SERPL-MCNC: 18 PG/ML (ref 0–100)
BUN BLD-MCNC: 28 MG/DL (ref 7–21)
BUN/CREAT SERPL: 23.1 (ref 7–25)
CALCIUM SPEC-SCNC: 10.5 MG/DL (ref 7.7–10)
CHLORIDE SERPL-SCNC: 108 MMOL/L (ref 99–112)
CLARITY UR: ABNORMAL
CO2 SERPL-SCNC: 23.9 MMOL/L (ref 24.3–31.9)
COLOR UR: ABNORMAL
CREAT BLD-MCNC: 1.21 MG/DL (ref 0.43–1.29)
CRP SERPL-MCNC: 2.28 MG/DL (ref 0–0.99)
D-LACTATE SERPL-SCNC: 2 MMOL/L (ref 0.5–2)
DEPRECATED RDW RBC AUTO: 47 FL (ref 37–54)
EOSINOPHIL # BLD AUTO: 0.08 10*3/MM3 (ref 0–0.7)
EOSINOPHIL NFR BLD AUTO: 0.7 % (ref 0–7)
ERYTHROCYTE [DISTWIDTH] IN BLOOD BY AUTOMATED COUNT: 14.3 % (ref 11.5–14.5)
ERYTHROCYTE [SEDIMENTATION RATE] IN BLOOD: 21 MM/HR (ref 0–30)
GFR SERPL CREATININE-BSD FRML MDRD: 43 ML/MIN/1.73
GLOBULIN UR ELPH-MCNC: 3.7 GM/DL
GLUCOSE BLD-MCNC: 153 MG/DL (ref 70–110)
GLUCOSE UR STRIP-MCNC: NEGATIVE MG/DL
HCT VFR BLD AUTO: 45.6 % (ref 37–47)
HGB BLD-MCNC: 14.5 G/DL (ref 12–16)
HGB UR QL STRIP.AUTO: NEGATIVE
HYALINE CASTS UR QL AUTO: ABNORMAL /LPF
IMM GRANULOCYTES # BLD: 0.03 10*3/MM3 (ref 0–0.03)
IMM GRANULOCYTES NFR BLD: 0.2 % (ref 0–0.5)
KETONES UR QL STRIP: ABNORMAL
LEUKOCYTE ESTERASE UR QL STRIP.AUTO: ABNORMAL
LYMPHOCYTES # BLD AUTO: 1.84 10*3/MM3 (ref 1–3)
LYMPHOCYTES NFR BLD AUTO: 15.3 % (ref 16–46)
MCH RBC QN AUTO: 29.7 PG (ref 27–33)
MCHC RBC AUTO-ENTMCNC: 31.8 G/DL (ref 33–37)
MCV RBC AUTO: 93.4 FL (ref 80–94)
MONOCYTES # BLD AUTO: 0.74 10*3/MM3 (ref 0.1–0.9)
MONOCYTES NFR BLD AUTO: 6.2 % (ref 0–12)
NEUTROPHILS # BLD AUTO: 9.31 10*3/MM3 (ref 1.4–6.5)
NEUTROPHILS NFR BLD AUTO: 77.4 % (ref 40–75)
NITRITE UR QL STRIP: POSITIVE
OSMOLALITY SERPL CALC.SUM OF ELEC: 291.6 MOSM/KG (ref 273–305)
PH UR STRIP.AUTO: 5.5 [PH] (ref 5–8)
PLATELET # BLD AUTO: 188 10*3/MM3 (ref 130–400)
PMV BLD AUTO: 11.6 FL (ref 6–10)
POTASSIUM BLD-SCNC: 3.5 MMOL/L (ref 3.5–5.3)
PROT SERPL-MCNC: 8.2 G/DL (ref 6–8)
PROT UR QL STRIP: NEGATIVE
RBC # BLD AUTO: 4.88 10*6/MM3 (ref 4.2–5.4)
RBC # UR: ABNORMAL /HPF
REF LAB TEST METHOD: ABNORMAL
SODIUM BLD-SCNC: 142 MMOL/L (ref 135–153)
SP GR UR STRIP: 1.03 (ref 1–1.03)
SQUAMOUS #/AREA URNS HPF: ABNORMAL /HPF
TROPONIN I SERPL-MCNC: 0.04 NG/ML
UROBILINOGEN UR QL STRIP: ABNORMAL
WBC NRBC COR # BLD: 12.03 10*3/MM3 (ref 4.5–12.5)
WBC UR QL AUTO: ABNORMAL /HPF
YEAST URNS QL MICRO: ABNORMAL /HPF

## 2017-10-17 PROCEDURE — 71010 XR CHEST 1 VW: CPT | Performed by: RADIOLOGY

## 2017-10-17 PROCEDURE — 87040 BLOOD CULTURE FOR BACTERIA: CPT | Performed by: FAMILY MEDICINE

## 2017-10-17 PROCEDURE — G0378 HOSPITAL OBSERVATION PER HR: HCPCS

## 2017-10-17 PROCEDURE — 87086 URINE CULTURE/COLONY COUNT: CPT | Performed by: FAMILY MEDICINE

## 2017-10-17 PROCEDURE — 99223 1ST HOSP IP/OBS HIGH 75: CPT | Performed by: INTERNAL MEDICINE

## 2017-10-17 PROCEDURE — 99284 EMERGENCY DEPT VISIT MOD MDM: CPT

## 2017-10-17 PROCEDURE — 82962 GLUCOSE BLOOD TEST: CPT

## 2017-10-17 PROCEDURE — 85652 RBC SED RATE AUTOMATED: CPT | Performed by: FAMILY MEDICINE

## 2017-10-17 PROCEDURE — 83605 ASSAY OF LACTIC ACID: CPT | Performed by: FAMILY MEDICINE

## 2017-10-17 PROCEDURE — 93005 ELECTROCARDIOGRAM TRACING: CPT | Performed by: FAMILY MEDICINE

## 2017-10-17 PROCEDURE — 87077 CULTURE AEROBIC IDENTIFY: CPT | Performed by: FAMILY MEDICINE

## 2017-10-17 PROCEDURE — 87186 SC STD MICRODIL/AGAR DIL: CPT | Performed by: FAMILY MEDICINE

## 2017-10-17 PROCEDURE — 85025 COMPLETE CBC W/AUTO DIFF WBC: CPT | Performed by: FAMILY MEDICINE

## 2017-10-17 PROCEDURE — 81001 URINALYSIS AUTO W/SCOPE: CPT | Performed by: FAMILY MEDICINE

## 2017-10-17 PROCEDURE — 80053 COMPREHEN METABOLIC PANEL: CPT | Performed by: FAMILY MEDICINE

## 2017-10-17 PROCEDURE — 25010000002 HEPARIN (PORCINE) PER 1000 UNITS: Performed by: INTERNAL MEDICINE

## 2017-10-17 PROCEDURE — 86140 C-REACTIVE PROTEIN: CPT | Performed by: FAMILY MEDICINE

## 2017-10-17 PROCEDURE — 83880 ASSAY OF NATRIURETIC PEPTIDE: CPT | Performed by: FAMILY MEDICINE

## 2017-10-17 PROCEDURE — P9612 CATHETERIZE FOR URINE SPEC: HCPCS

## 2017-10-17 PROCEDURE — 94799 UNLISTED PULMONARY SVC/PX: CPT

## 2017-10-17 PROCEDURE — 71010 HC CHEST PA OR AP: CPT

## 2017-10-17 PROCEDURE — 25010000002 CEFTRIAXONE: Performed by: FAMILY MEDICINE

## 2017-10-17 PROCEDURE — 84484 ASSAY OF TROPONIN QUANT: CPT | Performed by: FAMILY MEDICINE

## 2017-10-17 RX ORDER — NICOTINE POLACRILEX 4 MG
15 LOZENGE BUCCAL
Status: DISCONTINUED | OUTPATIENT
Start: 2017-10-17 | End: 2017-10-23 | Stop reason: HOSPADM

## 2017-10-17 RX ORDER — ONDANSETRON 4 MG/1
4 TABLET, FILM COATED ORAL EVERY 6 HOURS PRN
Status: DISCONTINUED | OUTPATIENT
Start: 2017-10-17 | End: 2017-10-23 | Stop reason: HOSPADM

## 2017-10-17 RX ORDER — BISACODYL 10 MG
10 SUPPOSITORY, RECTAL RECTAL DAILY PRN
Status: DISCONTINUED | OUTPATIENT
Start: 2017-10-17 | End: 2017-10-23 | Stop reason: HOSPADM

## 2017-10-17 RX ORDER — IBUPROFEN/PSEUDOEPHEDRINE HCL 200MG-30MG
1 TABLET ORAL NIGHTLY
Status: CANCELLED | OUTPATIENT
Start: 2017-10-17

## 2017-10-17 RX ORDER — SODIUM CHLORIDE 9 MG/ML
INJECTION, SOLUTION INTRAVENOUS
Status: DISPENSED
Start: 2017-10-17 | End: 2017-10-18

## 2017-10-17 RX ORDER — SODIUM CHLORIDE 0.9 % (FLUSH) 0.9 %
1-10 SYRINGE (ML) INJECTION AS NEEDED
Status: DISCONTINUED | OUTPATIENT
Start: 2017-10-17 | End: 2017-10-23 | Stop reason: HOSPADM

## 2017-10-17 RX ORDER — DEXTROSE MONOHYDRATE 25 G/50ML
25 INJECTION, SOLUTION INTRAVENOUS
Status: DISCONTINUED | OUTPATIENT
Start: 2017-10-17 | End: 2017-10-23 | Stop reason: HOSPADM

## 2017-10-17 RX ORDER — SODIUM CHLORIDE 0.9 % (FLUSH) 0.9 %
10 SYRINGE (ML) INJECTION AS NEEDED
Status: DISCONTINUED | OUTPATIENT
Start: 2017-10-17 | End: 2017-10-23 | Stop reason: HOSPADM

## 2017-10-17 RX ORDER — ACETAMINOPHEN 325 MG/1
650 TABLET ORAL EVERY 4 HOURS PRN
Status: DISCONTINUED | OUTPATIENT
Start: 2017-10-17 | End: 2017-10-23 | Stop reason: HOSPADM

## 2017-10-17 RX ORDER — SODIUM CHLORIDE 9 MG/ML
60 INJECTION, SOLUTION INTRAVENOUS CONTINUOUS
Status: DISCONTINUED | OUTPATIENT
Start: 2017-10-17 | End: 2017-10-21

## 2017-10-17 RX ORDER — LISINOPRIL 40 MG/1
40 TABLET ORAL NIGHTLY
Status: ON HOLD | COMMUNITY
End: 2017-10-17

## 2017-10-17 RX ORDER — HEPARIN SODIUM 5000 [USP'U]/ML
5000 INJECTION, SOLUTION INTRAVENOUS; SUBCUTANEOUS EVERY 12 HOURS SCHEDULED
Status: DISCONTINUED | OUTPATIENT
Start: 2017-10-17 | End: 2017-10-23 | Stop reason: HOSPADM

## 2017-10-17 RX ADMIN — SODIUM CHLORIDE 1000 ML: 9 INJECTION, SOLUTION INTRAVENOUS at 18:35

## 2017-10-17 RX ADMIN — SODIUM CHLORIDE 100 ML/HR: 9 INJECTION, SOLUTION INTRAVENOUS at 22:59

## 2017-10-17 RX ADMIN — CEFTRIAXONE 1 G: 1 INJECTION, POWDER, FOR SOLUTION INTRAMUSCULAR; INTRAVENOUS at 20:21

## 2017-10-17 RX ADMIN — HEPARIN SODIUM 5000 UNITS: 5000 INJECTION, SOLUTION INTRAVENOUS; SUBCUTANEOUS at 22:59

## 2017-10-17 NOTE — ED NOTES
Straight catheterization performed, patient perineum cleansed with perineal hygiene wipe. Sterile technique maintained throughout procedure. Patient tolerated well, will continue to monitor.        Claudia Richardson RN  10/17/17 8093

## 2017-10-17 NOTE — ED PROVIDER NOTES
Subjective   History of Present Illness  76 y/o F w/ dementia and physical debility p/w continued weakness and decreased appetite that has continued to worsen for many months. The pt's family is worried that they can no longer take care of pt and state that her weakness with ambulation has been worsening for one month since pt last fell trying to go to the bathroom. Pt's family state that the pt is afraid to ambulate b/c of the fall and has been urinating and defecating on herself rather than attempt to go to the bathroom. No fevers/chills. Pt has also had a decreased appetite but is able to take PO.   Review of Systems   Constitutional: Positive for fatigue. Negative for chills and fever.   Eyes: Negative for photophobia and visual disturbance.   Respiratory: Negative for cough, chest tightness, shortness of breath and wheezing.    Cardiovascular: Negative for chest pain and palpitations.   Gastrointestinal: Negative for abdominal distention and abdominal pain.   Genitourinary: Negative for difficulty urinating and dysuria.   Musculoskeletal: Negative for back pain and neck pain.   Skin: Negative for color change and pallor.   Psychiatric/Behavioral: Positive for confusion.   All other systems reviewed and are negative.      Past Medical History:   Diagnosis Date   • Dementia    • Depression    • Diabetes mellitus    • Hyperlipidemia    • Hypertension    • Obesity    • Senile dementia    • Vitamin D deficiency disease        No Known Allergies    Past Surgical History:   Procedure Laterality Date   • CARDIAC CATHETERIZATION  05/05/2006   • CARDIOVASCULAR STRESS TEST  05/05/2006   • COLONOSCOPY  03/21/2008   • HYSTERECTOMY     • THYROID SURGERY         Family History   Problem Relation Age of Onset   • Family history unknown: Yes       Social History     Social History   • Marital status:      Spouse name: N/A   • Number of children: N/A   • Years of education: N/A     Social History Main Topics   • Smoking  status: Never Smoker   • Smokeless tobacco: Never Used   • Alcohol use No   • Drug use: No   • Sexual activity: Defer     Other Topics Concern   • None     Social History Narrative           Objective   Physical Exam   Constitutional: She appears well-developed and well-nourished. She is active.   HENT:   Head: Normocephalic and atraumatic.   Right Ear: Hearing and external ear normal.   Left Ear: Hearing and external ear normal.   Nose: Nose normal.   Mouth/Throat: Uvula is midline, oropharynx is clear and moist and mucous membranes are normal.   Eyes: Conjunctivae, EOM and lids are normal. Pupils are equal, round, and reactive to light.   Neck: Trachea normal, normal range of motion, full passive range of motion without pain and phonation normal. Neck supple.   Cardiovascular: Normal rate, regular rhythm and normal heart sounds.    Pulmonary/Chest: Effort normal and breath sounds normal.   Abdominal: Soft. Normal appearance.   Neurological: She is alert. She is disoriented. GCS eye subscore is 4. GCS verbal subscore is 5. GCS motor subscore is 6.   Skin: Skin is warm, dry and intact.   Psychiatric: She has a normal mood and affect. Her speech is normal and behavior is normal.   Nursing note and vitals reviewed.      Procedures         ED Course  ED Course   Value Comment By Time   ECG 12 Lead NSR, 83 bpm. QTc 444ms. No ST segment abnormalities concerning for STEMI. No pathologic blocks or dysrhythmias. Gio Lan MD 10/17 6104                  [facility]  Number of Diagnoses or Management Options  Acute cystitis without hematuria: new and requires workup  Physical debility: established and worsening  Senile dementia without behavioral disturbance: established and worsening     Amount and/or Complexity of Data Reviewed  Clinical lab tests: reviewed and ordered  Tests in the radiology section of CPT®: reviewed and ordered  Tests in the medicine section of CPT®: reviewed and ordered  Discuss the patient with  other providers: yes  Independent visualization of images, tracings, or specimens: yes    Risk of Complications, Morbidity, and/or Mortality  Presenting problems: high  Diagnostic procedures: high  Management options: high    Patient Progress  Patient progress: stable      Final diagnoses:   Acute cystitis without hematuria   Senile dementia without behavioral disturbance   Physical debility            Gio Lan MD  10/17/17 7382

## 2017-10-18 ENCOUNTER — APPOINTMENT (OUTPATIENT)
Dept: CT IMAGING | Facility: HOSPITAL | Age: 77
End: 2017-10-18

## 2017-10-18 PROBLEM — A41.9 SEPSIS: Status: ACTIVE | Noted: 2017-10-18

## 2017-10-18 LAB
ANION GAP SERPL CALCULATED.3IONS-SCNC: 6.2 MMOL/L (ref 3.6–11.2)
BASOPHILS # BLD AUTO: 0.01 10*3/MM3 (ref 0–0.3)
BASOPHILS NFR BLD AUTO: 0.1 % (ref 0–2)
BUN BLD-MCNC: 23 MG/DL (ref 7–21)
BUN/CREAT SERPL: 24.5 (ref 7–25)
CALCIUM SPEC-SCNC: 9.2 MG/DL (ref 7.7–10)
CHLORIDE SERPL-SCNC: 113 MMOL/L (ref 99–112)
CO2 SERPL-SCNC: 23.8 MMOL/L (ref 24.3–31.9)
CREAT BLD-MCNC: 0.94 MG/DL (ref 0.43–1.29)
DEPRECATED RDW RBC AUTO: 46.1 FL (ref 37–54)
EOSINOPHIL # BLD AUTO: 0.07 10*3/MM3 (ref 0–0.7)
EOSINOPHIL NFR BLD AUTO: 0.9 % (ref 0–7)
ERYTHROCYTE [DISTWIDTH] IN BLOOD BY AUTOMATED COUNT: 14 % (ref 11.5–14.5)
GFR SERPL CREATININE-BSD FRML MDRD: 58 ML/MIN/1.73
GLUCOSE BLD-MCNC: 120 MG/DL (ref 70–110)
GLUCOSE BLDC GLUCOMTR-MCNC: 108 MG/DL (ref 70–130)
GLUCOSE BLDC GLUCOMTR-MCNC: 108 MG/DL (ref 70–130)
HBA1C MFR BLD: 5.8 % (ref 4.5–5.7)
HCT VFR BLD AUTO: 40.7 % (ref 37–47)
HGB BLD-MCNC: 12.8 G/DL (ref 12–16)
IMM GRANULOCYTES # BLD: 0.01 10*3/MM3 (ref 0–0.03)
IMM GRANULOCYTES NFR BLD: 0.1 % (ref 0–0.5)
LYMPHOCYTES # BLD AUTO: 2.12 10*3/MM3 (ref 1–3)
LYMPHOCYTES NFR BLD AUTO: 27 % (ref 16–46)
MCH RBC QN AUTO: 30 PG (ref 27–33)
MCHC RBC AUTO-ENTMCNC: 31.4 G/DL (ref 33–37)
MCV RBC AUTO: 95.5 FL (ref 80–94)
MONOCYTES # BLD AUTO: 0.5 10*3/MM3 (ref 0.1–0.9)
MONOCYTES NFR BLD AUTO: 6.4 % (ref 0–12)
NEUTROPHILS # BLD AUTO: 5.14 10*3/MM3 (ref 1.4–6.5)
NEUTROPHILS NFR BLD AUTO: 65.5 % (ref 40–75)
OSMOLALITY SERPL CALC.SUM OF ELEC: 289.9 MOSM/KG (ref 273–305)
PLATELET # BLD AUTO: 158 10*3/MM3 (ref 130–400)
PMV BLD AUTO: 11.7 FL (ref 6–10)
POTASSIUM BLD-SCNC: 3.8 MMOL/L (ref 3.5–5.3)
RBC # BLD AUTO: 4.26 10*6/MM3 (ref 4.2–5.4)
SODIUM BLD-SCNC: 143 MMOL/L (ref 135–153)
TSH SERPL DL<=0.05 MIU/L-ACNC: 2.99 MIU/ML (ref 0.55–4.78)
WBC NRBC COR # BLD: 7.85 10*3/MM3 (ref 4.5–12.5)

## 2017-10-18 PROCEDURE — G8980 MOBILITY D/C STATUS: HCPCS

## 2017-10-18 PROCEDURE — 97530 THERAPEUTIC ACTIVITIES: CPT

## 2017-10-18 PROCEDURE — G8988 SELF CARE GOAL STATUS: HCPCS

## 2017-10-18 PROCEDURE — 97167 OT EVAL HIGH COMPLEX 60 MIN: CPT

## 2017-10-18 PROCEDURE — 70450 CT HEAD/BRAIN W/O DYE: CPT

## 2017-10-18 PROCEDURE — 70450 CT HEAD/BRAIN W/O DYE: CPT | Performed by: RADIOLOGY

## 2017-10-18 PROCEDURE — 97163 PT EVAL HIGH COMPLEX 45 MIN: CPT

## 2017-10-18 PROCEDURE — 82962 GLUCOSE BLOOD TEST: CPT

## 2017-10-18 PROCEDURE — G8987 SELF CARE CURRENT STATUS: HCPCS

## 2017-10-18 PROCEDURE — G8978 MOBILITY CURRENT STATUS: HCPCS

## 2017-10-18 PROCEDURE — 94799 UNLISTED PULMONARY SVC/PX: CPT

## 2017-10-18 PROCEDURE — 84443 ASSAY THYROID STIM HORMONE: CPT | Performed by: PHYSICIAN ASSISTANT

## 2017-10-18 PROCEDURE — 25010000002 HEPARIN (PORCINE) PER 1000 UNITS: Performed by: INTERNAL MEDICINE

## 2017-10-18 PROCEDURE — 83036 HEMOGLOBIN GLYCOSYLATED A1C: CPT | Performed by: INTERNAL MEDICINE

## 2017-10-18 PROCEDURE — 80048 BASIC METABOLIC PNL TOTAL CA: CPT | Performed by: INTERNAL MEDICINE

## 2017-10-18 PROCEDURE — 99233 SBSQ HOSP IP/OBS HIGH 50: CPT | Performed by: INTERNAL MEDICINE

## 2017-10-18 PROCEDURE — 85025 COMPLETE CBC W/AUTO DIFF WBC: CPT | Performed by: INTERNAL MEDICINE

## 2017-10-18 PROCEDURE — 25010000002 CEFTRIAXONE: Performed by: INTERNAL MEDICINE

## 2017-10-18 PROCEDURE — 63710000001 INSULIN ASPART PER 5 UNITS: Performed by: INTERNAL MEDICINE

## 2017-10-18 RX ORDER — DONEPEZIL HYDROCHLORIDE 5 MG/1
10 TABLET, FILM COATED ORAL NIGHTLY
Status: DISCONTINUED | OUTPATIENT
Start: 2017-10-18 | End: 2017-10-23 | Stop reason: HOSPADM

## 2017-10-18 RX ORDER — ESCITALOPRAM OXALATE 10 MG/1
10 TABLET ORAL NIGHTLY
Status: DISCONTINUED | OUTPATIENT
Start: 2017-10-18 | End: 2017-10-23 | Stop reason: HOSPADM

## 2017-10-18 RX ORDER — ATORVASTATIN CALCIUM 40 MG/1
40 TABLET, FILM COATED ORAL NIGHTLY
Status: DISCONTINUED | OUTPATIENT
Start: 2017-10-18 | End: 2017-10-23 | Stop reason: HOSPADM

## 2017-10-18 RX ORDER — CLONAZEPAM 0.5 MG/1
0.5 TABLET ORAL 2 TIMES DAILY PRN
Status: DISCONTINUED | OUTPATIENT
Start: 2017-10-18 | End: 2017-10-23 | Stop reason: HOSPADM

## 2017-10-18 RX ORDER — L.ACID,CASEI/B.ANIMAL/S.THERMO 16B CELL
1 CAPSULE ORAL DAILY
Status: DISCONTINUED | OUTPATIENT
Start: 2017-10-18 | End: 2017-10-23 | Stop reason: HOSPADM

## 2017-10-18 RX ORDER — TOPIRAMATE 25 MG/1
25 TABLET ORAL NIGHTLY
Status: CANCELLED | OUTPATIENT
Start: 2017-10-18

## 2017-10-18 RX ORDER — LEVOTHYROXINE SODIUM 0.03 MG/1
25 TABLET ORAL NIGHTLY
Status: DISCONTINUED | OUTPATIENT
Start: 2017-10-18 | End: 2017-10-23 | Stop reason: HOSPADM

## 2017-10-18 RX ORDER — LEVOTHYROXINE SODIUM 0.03 MG/1
25 TABLET ORAL NIGHTLY
Status: CANCELLED | OUTPATIENT
Start: 2017-10-18

## 2017-10-18 RX ORDER — MEMANTINE HYDROCHLORIDE 10 MG/1
10 TABLET ORAL EVERY 12 HOURS SCHEDULED
Status: DISCONTINUED | OUTPATIENT
Start: 2017-10-18 | End: 2017-10-23 | Stop reason: HOSPADM

## 2017-10-18 RX ADMIN — ESCITALOPRAM 10 MG: 10 TABLET, FILM COATED ORAL at 19:55

## 2017-10-18 RX ADMIN — Medication 1 CAPSULE: at 17:50

## 2017-10-18 RX ADMIN — CEFTRIAXONE 1 G: 1 INJECTION, POWDER, FOR SOLUTION INTRAMUSCULAR; INTRAVENOUS at 19:56

## 2017-10-18 RX ADMIN — ATORVASTATIN CALCIUM 40 MG: 40 TABLET, FILM COATED ORAL at 19:53

## 2017-10-18 RX ADMIN — HEPARIN SODIUM 5000 UNITS: 5000 INJECTION, SOLUTION INTRAVENOUS; SUBCUTANEOUS at 19:56

## 2017-10-18 RX ADMIN — SODIUM CHLORIDE 100 ML/HR: 9 INJECTION, SOLUTION INTRAVENOUS at 08:42

## 2017-10-18 RX ADMIN — DONEPEZIL HYDROCHLORIDE 10 MG: 5 TABLET, FILM COATED ORAL at 19:55

## 2017-10-18 RX ADMIN — SODIUM CHLORIDE 100 ML/HR: 9 INJECTION, SOLUTION INTRAVENOUS at 17:50

## 2017-10-18 RX ADMIN — HEPARIN SODIUM 5000 UNITS: 5000 INJECTION, SOLUTION INTRAVENOUS; SUBCUTANEOUS at 08:41

## 2017-10-18 RX ADMIN — MEMANTINE HYDROCHLORIDE 10 MG: 10 TABLET ORAL at 11:51

## 2017-10-18 RX ADMIN — MEMANTINE HYDROCHLORIDE 10 MG: 10 TABLET ORAL at 19:55

## 2017-10-18 NOTE — PROGRESS NOTES
Subjective     History:   Luz Beasley is a 77 y.o. female admitted on 10/17/2017 secondary to UTI (urinary tract infection)     Procedures: None    Patient seen and examined with CHRISTINE Burgos. Awake and alert but confused. Oriented to person only. States she has to use the bathroom. Denies any other complaints but her confusion makes obtaining an accurate history very difficult. No acute events overnight per RN.     History taken from: chart, and RN.      Objective     Vital Signs  Temp:  [97.7 °F (36.5 °C)-98.7 °F (37.1 °C)] 98.3 °F (36.8 °C)  Heart Rate:  [] 62  Resp:  [18-20] 18  BP: (133-163)/(65-88) 153/73    Intake/Output Summary (Last 24 hours) at 10/18/17 1255  Last data filed at 10/18/17 0834   Gross per 24 hour   Intake             2330 ml   Output                0 ml   Net             2330 ml         Physical Exam:  General:    Awake, alert but confused (oriented to person only), in no acute distress, able to follow simple commands    Heart:      Normal S1 and S2. Regular rate and rhythm. No significant murmur, rubs or gallops appreciated.   Lungs:     Respirations regular, even and unlabored. Lungs clear to auscultation B/L. No wheezes, rales or rhonchi.   Abdomen:   Soft and nontender. No guarding, rebound tenderness or  organomegaly noted. Bowel sounds present x 4.   Extremities:  No clubbing, cyanosis or edema noted. Moves UE and LE equally B/L.     Results Review:      Results from last 7 days  Lab Units 10/18/17  0102 10/17/17  1818   WBC 10*3/mm3 7.85 12.03   HEMOGLOBIN g/dL 12.8 14.5   PLATELETS 10*3/mm3 158 188       Results from last 7 days  Lab Units 10/18/17  0102 10/17/17  1818   SODIUM mmol/L 143 142   POTASSIUM mmol/L 3.8 3.5   CHLORIDE mmol/L 113* 108   CO2 mmol/L 23.8* 23.9*   BUN mg/dL 23* 28*   CREATININE mg/dL 0.94 1.21   CALCIUM mg/dL 9.2 10.5*   GLUCOSE mg/dL 120* 153*       Results from last 7 days  Lab Units 10/17/17  1818   BILIRUBIN mg/dL 1.6   ALK PHOS U/L 113*   AST (SGOT)  U/L 30   ALT (SGPT) U/L 23               Results from last 7 days  Lab Units 10/17/17  1818   TROPONIN I ng/mL 0.040       Imaging Results (last 24 hours)     Procedure Component Value Units Date/Time    XR Chest 1 View [[id number]] Collected:  10/18/17 0627     Updated:  10/18/17 0629    Narrative:       EXAMINATION: XR CHEST 1 VW-      CLINICAL INDICATION:     weakness and confusion     TECHNIQUE:  XR CHEST 1 VW-      COMPARISON: 11/22/2016      FINDINGS:   Lungs are aerated.   Heart and mediastinal contours are unremarkable.   No pneumothorax.   No pleural effusion.   No acute osseous findings.            Impression:       No radiographic evidence of acute cardiac or pulmonary  disease.     This report was finalized on 10/18/2017 6:27 AM by Dr. Nate Thomas MD.               Medications:    atorvastatin 40 mg Oral Nightly   ceftriaxone 1 g Intravenous Q24H   donepezil 10 mg Oral Nightly   escitalopram 10 mg Oral Nightly   heparin (porcine) 5,000 Units Subcutaneous Q12H   insulin aspart 0-7 Units Subcutaneous 4x Daily AC & at Bedtime   levothyroxine 25 mcg Oral Nightly   memantine 10 mg Oral Q12H   Vortioxetine HBr 10 mg Oral Nightly       sodium chloride 100 mL/hr Last Rate: 100 mL/hr (10/18/17 0842)           Assessment/Plan   Severe sepsis: Pt met sepsis criteria upon admission with HR>90 and elevated CRP. Likely 2/2 UTI. Evidence of end organ dysfunction with reported worsening confusion superimposed on her baseline dementia. Currently afebrile and hemodynamically stable. Lactate normal upon admission. WBC remains stable. Currently on Rocephin. Cont IVF's, follow cultures and repeat labs in the AM.     UTI: Cont Rocephin as outlined above.     Metabolic encephalopathy superimposed on baseline dementia: Discussed pt's care with her granddaughter with whom she lives who reports recent worsening confusion. Will order CT head. TSH is normal. Cont treatment of sepsis as outlined above. Cont home medication  regimen and supportive treatment.     Dehydration: BUN/Cr ratio>20. Cont IVF's. Repeat labs in the AM.     Mild hypercalcemia: Likely 2/2 dehydration. Resolved this AM. Cont IVF's and repeat labs in the AM.     Generalized weakness/debility: Cont treatment as outlined above. PT/OT consulted. Pt's family interested in rehab placement and SS has been consulted.     Hypothyroidism: TSH is normal. Cont home Synthroid.     DM II, non-insulin dependent: HgbA1c is 5.8. Holding home metformin. Cont SSI with Accuchecks.     DVT PPX: SQ heparin    Pt is at high risk 2/2 severe sepsis, UTI, metabolic encephalopathy superimposed on baseline dementia, dehydration and debility.         Yusef Rivera, DO  10/18/17  12:55 PM

## 2017-10-18 NOTE — PLAN OF CARE
Problem: Patient Care Overview (Adult)  Goal: Plan of Care Review  Outcome: Ongoing (interventions implemented as appropriate)  Goal: Adult Individualization and Mutuality  Outcome: Ongoing (interventions implemented as appropriate)  Goal: Discharge Needs Assessment  Outcome: Ongoing (interventions implemented as appropriate)    Problem: Fall Risk (Adult)  Goal: Identify Related Risk Factors and Signs and Symptoms  Outcome: Ongoing (interventions implemented as appropriate)  Goal: Absence of Falls  Outcome: Ongoing (interventions implemented as appropriate)    Problem: Infection, Risk/Actual (Adult)  Goal: Identify Related Risk Factors and Signs and Symptoms  Outcome: Ongoing (interventions implemented as appropriate)  Goal: Infection Prevention/Resolution  Outcome: Ongoing (interventions implemented as appropriate)    Problem: Pressure Ulcer Risk (Calin Scale) (Adult,Obstetrics,Pediatric)  Goal: Identify Related Risk Factors and Signs and Symptoms  Outcome: Ongoing (interventions implemented as appropriate)  Goal: Skin Integrity  Outcome: Ongoing (interventions implemented as appropriate)

## 2017-10-18 NOTE — H&P
[facility]    Patient Identification:  Name:  Luz Beasley  Age:  77 y.o.  Sex:  female  :  1940  MRN:  [id number]   Visit Number:  [id number]  Primary Care Physician:  Musa Mondragon MD     Chief complaint: Progressive deconditioning.    History of presenting illness:  77 y.o. female who lives at home with family and has dementia.  Family brought her to the emergency room as a result of worsening physical deconditioning, refusal to eat, dehydration and difficulty with continued care of patient at home.  Her daughter who was by patient's bedside reported that patient fell about 1 month ago while attempting to go to the bathroom.  Since that incident, patient has been very scared to ambulate.  Ultimately urinates and defecates on herself rather than attempt to go to the bathroom.  There is no report of fever/chills.  Patient denies any complaint. Patient's daughter reports that patient has not been eating or drinking except occasional ensure food supplement.  It has become very difficult for family to continue to care for patient at home.  The family has requested possible rehabilitation or nursing home placement.  Meanwhile, urine analysis was positive for urinary tract infection and patient is currently on IV antibiotics.  She appears dehydrated clinically.    ---------------------------------------------------------------------------------------------------------------------   Review of Systems   Unable to perform ROS: Dementia   Constitutional: Positive for activity change, appetite change and fatigue. Negative for chills, diaphoresis and fever.   HENT: Negative for sneezing.       ---------------------------------------------------------------------------------------------------------------------   Past Medical History:   Diagnosis Date   • Dementia    • Depression    • Diabetes mellitus    • Hyperlipidemia    • Hypertension    • Obesity    • Senile  dementia    • Vitamin D deficiency disease      Past Surgical History:   Procedure Laterality Date   • CARDIAC CATHETERIZATION  05/05/2006   • CARDIOVASCULAR STRESS TEST  05/05/2006   • COLONOSCOPY  03/21/2008   • HYSTERECTOMY     • THYROID SURGERY       Family History   Problem Relation Age of Onset   • Family history unknown: Yes     Social History     Social History   • Marital status:      Spouse name: N/A   • Number of children: N/A   • Years of education: N/A     Social History Main Topics   • Smoking status: Never Smoker   • Smokeless tobacco: Never Used   • Alcohol use No   • Drug use: No   • Sexual activity: Defer     Other Topics Concern   • None     Social History Narrative     ---------------------------------------------------------------------------------------------------------------------   Allergies:  Review of patient's allergies indicates no known allergies.  ---------------------------------------------------------------------------------------------------------------------   Prior to Admission Medications     Prescriptions Last Dose Informant Patient Reported? Taking?    atorvastatin (LIPITOR) 80 MG tablet 10/16/2017 Family Member No Yes    Take 0.5 tablets by mouth Daily.    Patient taking differently:  Take 80 mg by mouth Every Night.    clonazePAM (KlonoPIN) 0.5 MG tablet 10/16/2017 Family Member No Yes    Take 1 tablet by mouth 2 (Two) Times a Day As Needed for Anxiety.    escitalopram (LEXAPRO) 10 MG tablet 10/16/2017 Family Member Yes Yes    Take 10 mg by mouth Every Night.    levothyroxine (LEVOTHROID) 25 MCG tablet 10/16/2017 Family Member No Yes    Take 1 tablet by mouth Daily.    Patient taking differently:  Take 25 mcg by mouth Every Night.    Melatonin 3 MG tablet dispersible 10/16/2017 Family Member Yes Yes    Take 1 tablet by mouth Every Night.    metFORMIN (GLUCOPHAGE) 500 MG tablet 10/16/2017 Family Member No Yes    Take 0.5 tablets by mouth Daily With Breakfast.     Patient taking differently:  Take 250 mg by mouth Every Night.    NAMZARIC 28-10 MG capsule sustained-release 24 hr 10/16/2017 Family Member Yes Yes    Take 1 tablet by mouth Every Night.    topiramate (TOPAMAX) 25 MG tablet 10/16/2017 Family Member No Yes    Take 1 tablet by mouth 2 (Two) Times a Day.    Patient taking differently:  Take 25 mg by mouth Every Night. Prescribed bid but patient caregiver/family member says she only takes it once a day    Vortioxetine HBr 10 MG tablet 10/16/2017 Family Member No Yes    Take 10 mg by mouth Daily.    Patient taking differently:  Take 10 mg by mouth Every Night.        Hospital Scheduled Meds:    ceftriaxone 1 g Intravenous Q24H   heparin (porcine) 5,000 Units Subcutaneous Q12H   insulin aspart 0-7 Units Subcutaneous 4x Daily AC & at Bedtime       sodium chloride 100 mL/hr Last Rate: 100 mL/hr (10/17/17 5318)     ---------------------------------------------------------------------------------------------------------------------   Vital Signs:  Temp:  [98 °F (36.7 °C)-98.7 °F (37.1 °C)] 98 °F (36.7 °C)  Heart Rate:  [] 68  Resp:  [18-20] 18  BP: (133-163)/(65-88) 163/82  Last 3 weights    10/17/17  1758 10/17/17  2146   Weight: 190 lb (86.2 kg) 179 lb 8 oz (81.4 kg)     Body mass index is 28.97 kg/(m^2).  ---------------------------------------------------------------------------------------------------------------------   Physical Exam:  Constitutional:  Well-developed and well-nourished.  No respiratory distress.      HENT:  Head: Normocephalic and atraumatic.  Mouth:  Dry mucous membranes.    Eyes:  Conjunctivae and EOM are normal.  Pupils are equal, round, and reactive to light.  No scleral icterus.  Neck:  Neck supple.  No JVD present.    Cardiovascular:  Normal rate, regular rhythm and normal heart sounds with no murmur.  Pulmonary/Chest:  No respiratory distress, no wheezes, no crackles, with normal breath sounds and good air movement.  Abdominal:  Soft.   Bowel sounds are normal.  No distension and no tenderness.   Musculoskeletal:  No edema, no tenderness, and no deformity.  No red or swollen joints anywhere.    Neurological:  Patient is awake and alert but disoriented.  No cranial nerve deficit.  No tongue deviation.  No facial droop.  No slurred speech.   Skin:  Skin is warm and dry.  No rash noted.  No pallor.   Psychiatric:  Normal mood and affect.  Behavior is normal.  Judgment and thought content normal.   Peripheral vascular:  No edema and strong pulses on all 4 extremities.  Genitourinary: Deferred.    ---------------------------------------------------------------------------------------------------------------------  EKG:    Telemetry:    I have personally looked at both the EKG and the telemetry strips.  ---------------------------------------------------------------------------------------------------------------------     Results from last 7 days  Lab Units 10/17/17  1818   CRP mg/dL 2.28*   LACTATE mmol/L 2.0   WBC 10*3/mm3 12.03   HEMOGLOBIN g/dL 14.5   HEMATOCRIT % 45.6   MCV fL 93.4   MCHC g/dL 31.8*   PLATELETS 10*3/mm3 188           Results from last 7 days  Lab Units 10/17/17  1818   SODIUM mmol/L 142   POTASSIUM mmol/L 3.5   CHLORIDE mmol/L 108   CO2 mmol/L 23.9*   BUN mg/dL 28*   CREATININE mg/dL 1.21   EGFR IF NONAFRICN AM mL/min/1.73 43*   CALCIUM mg/dL 10.5*   GLUCOSE mg/dL 153*   ALBUMIN g/dL 4.50   BILIRUBIN mg/dL 1.6   ALK PHOS U/L 113*   AST (SGOT) U/L 30   ALT (SGPT) U/L 23   Estimated Creatinine Clearance: 41.9 mL/min (by C-G formula based on Cr of 1.21).  No results found for: AMMONIA    Results from last 7 days  Lab Units 10/17/17  1818   TROPONIN I ng/mL 0.040         Lab Results   Component Value Date    HGBA1C 6.20 (H) 06/12/2017     Lab Results   Component Value Date    TSH 1.199 06/12/2017    FREET4 1.16 06/12/2017     No results found for: PREGTESTUR, PREGSERUM, HCG, HCGQUANT  Pain Management Panel     There is no flowsheet  data to display.                        ---------------------------------------------------------------------------------------------------------------------  Imaging Results (last 7 days)     Procedure Component Value Units Date/Time    XR Chest 1 View [[id number]] Resulted:  10/17/17 1859     Updated:  10/17/17 1859          I have personally reviewed the radiology images and read the final radiology report.  ---------------------------------------------------------------------------------------------------------------------  Assessment and Plan:  1.  Physical deconditioning and self-care deficits.  Family is requesting possible rehabilitation or nursing home placement.,  Increasingly difficult for family to take care of patient at home.  We will consult social service for evaluation and recommendations.  2.  Dehydration.This is most likely secondary to poor oral intakes.  We will start gentle hydration.  3.  Urinary tract infection.  Patient is started on IV Rocephin and will follow up urine culture.  4.  Mild hypercalcemia.  Possibly secondary to dehydration.  We will check calcium level in the morning.  4.  Dementia.  Supportive care.  5.  Diabetes mellitus.  Continue finger stick glucose monitoring with sliding scale protocol.  Encourage patient to eat.  6.  DVT prophylaxis.  Bilateral SCDs.    Mani Heredia MD  10/18/17  12:27 AM

## 2017-10-18 NOTE — DISCHARGE PLACEMENT REQUEST
"Marie Beasley (77 y.o. Female)     Date of Birth Social Security Number Address Home Phone MRN    1940  [address] [place] [address] [phone] [id number]    Alevism Marital Status          None        Admission Date Admission Type Admitting Provider Attending Provider Department, Room/Bed    10/17/17 Emergency Mani Heredia MD Aliu, Peter I, MD [address], 3304/2S    Discharge Date Discharge Disposition Discharge Destination                      Attending Provider: Mani Heredia MD     Allergies:  No Known Allergies    Isolation:  None   Infection:  None   Code Status:  FULL    Ht:  66\" (167.6 cm)   Wt:  179 lb 8 oz (81.4 kg)    Admission Cmt:  None   Principal Problem:  UTI (urinary tract infection) [N39.0]                 Active Insurance as of 10/17/2017     Primary Coverage     Payor Plan Insurance Group Employer/Plan Group    MEDICARE MEDICARE A & B      Payor Plan Address Payor Plan Phone Number Effective From Effective To    [address] [phone] 5/1/2005     [place] [address]       Subscriber Name Subscriber Birth Date Member ID       MARIE BEASLEY 1940 [id number]           Secondary Coverage     Payor Plan Insurance Group Employer/Plan Group    [facility] [id number]     Payor Plan Address Payor Plan Phone Number Effective From Effective To    [address] [phone] 7/8/2016     [place] [address]       Subscriber Name Subscriber Birth Date Member ID       MARIE BEASLEY 1940 [id number]                 Emergency Contacts      (Rel.) Home Phone Work Phone Mobile Phone    Sophia Craft (Guardian) [phone] -- [phone]    Erika Beasley (Daughter) [phone] -- --            Emergency Contact Information     Name Relation Home Work Mobile    Sophia Craft Guardian [phone]    Erika Beasley Daughter [phone]            Insurance Information                " MEDICARE/MEDICARE A & B Phone: [phone]    Subscriber: Luz Beasley Subscriber#: [id number]    Group#:  Precert#:         ANTHEM BLUE CROSS/ANTHEM BLUE CROSS BLUE [facility] PPO Phone: [phone]    Subscriber: Luz Beasley Subscriber#: [id number]    Group#: [id number] Precert#:           Treatment Team     Provider Relationship Specialty Contact    Mani Heredia MD Attending, Consulting Physician Hospitalist  [phone]    Shahida Richardson, PT Physical Therapist Physical Therapy      Kori Minor Technician --      Loretta Sandra RN Registered Nurse --      Collin Medrano, RRT Respiratory Therapist --  [phone]          Problem List           Codes Noted - Resolved       Hospital    Sepsis ICD-10-CM: A41.9  ICD-9-CM: 038.9, 995.91 10/18/2017 - Present    * (Principal)UTI (urinary tract infection) ICD-10-CM: N39.0  ICD-9-CM: 599.0 10/17/2017 - Present       Non-Hospital    Cluster headaches ICD-10-CM: G44.009  ICD-9-CM: 339.00 9/7/2017 - Present    Arthritis ICD-10-CM: M19.90  ICD-9-CM: 716.90 6/12/2017 - Present    Loss of balance ICD-10-CM: R26.89  ICD-9-CM: 781.99 6/12/2017 - Present    Acquired hypothyroidism ICD-10-CM: E03.9  ICD-9-CM: 244.9 6/12/2017 - Present    Anxiety ICD-10-CM: F41.9  ICD-9-CM: 300.00 6/12/2017 - Present    Hyperparathyroidism status post parathyroidectomy* ICD-10-CM: E21.3  ICD-9-CM: 252.00 7/15/2016 - Present    Diabetes mellitus* ICD-10-CM: E11.9  ICD-9-CM: 250.00 6/17/2016 - Present    Hyperlipemia* ICD-10-CM: E78.5  ICD-9-CM: 272.4 6/17/2016 - Present    Senile dementia* ICD-10-CM: F03.90  ICD-9-CM: 290.0 6/17/2016 - Present    Vitamin D deficiency* ICD-10-CM: E55.9  ICD-9-CM: 268.9 6/17/2016 - Present    Essential hypertension ICD-10-CM: I10  ICD-9-CM: 401.9 6/17/2016 - Present    Obesity* ICD-10-CM: E66.9  ICD-9-CM: 278.00 6/17/2016 - Present             History & Physical      Mani Heredia MD at 10/18/2017 12:27 AM              [facility]  HISTORY AND PHYSICAL    Patient Identification:  Name:  Luz Beasley  Age:  77 y.o.  Sex:  female  :  1940  MRN:  [id number]   Visit Number:  [id number]  Primary Care Physician:  Musa Mondragon MD     Chief complaint: Progressive deconditioning.    History of presenting illness:  77 y.o. female who lives at home with family and has dementia.  Family brought her to the emergency room as a result of worsening physical deconditioning, refusal to eat, dehydration and difficulty with continued care of patient at home.  Her daughter who was by patient's bedside reported that patient fell about 1 month ago while attempting to go to the bathroom.  Since that incident, patient has been very scared to ambulate.  Ultimately urinates and defecates on herself rather than attempt to go to the bathroom.  There is no report of fever/chills.  Patient denies any complaint. Patient's daughter reports that patient has not been eating or drinking except occasional ensure food supplement.  It has become very difficult for family to continue to care for patient at home.  The family has requested possible rehabilitation or nursing home placement.  Meanwhile, urine analysis was positive for urinary tract infection and patient is currently on IV antibiotics.  She appears dehydrated clinically.    ---------------------------------------------------------------------------------------------------------------------   Review of Systems   Unable to perform ROS: Dementia   Constitutional: Positive for activity change, appetite change and fatigue. Negative for chills, diaphoresis and fever.   HENT: Negative for sneezing.       ---------------------------------------------------------------------------------------------------------------------   Past Medical History:   Diagnosis Date   • Dementia    • Depression    • Diabetes mellitus    • Hyperlipidemia    • Hypertension    • Obesity    • Senile dementia    • Vitamin D deficiency  disease      Past Surgical History:   Procedure Laterality Date   • CARDIAC CATHETERIZATION  05/05/2006   • CARDIOVASCULAR STRESS TEST  05/05/2006   • COLONOSCOPY  03/21/2008   • HYSTERECTOMY     • THYROID SURGERY       Family History   Problem Relation Age of Onset   • Family history unknown: Yes     Social History     Social History   • Marital status:      Spouse name: N/A   • Number of children: N/A   • Years of education: N/A     Social History Main Topics   • Smoking status: Never Smoker   • Smokeless tobacco: Never Used   • Alcohol use No   • Drug use: No   • Sexual activity: Defer     Other Topics Concern   • None     Social History Narrative     ---------------------------------------------------------------------------------------------------------------------   Allergies:  Review of patient's allergies indicates no known allergies.  ---------------------------------------------------------------------------------------------------------------------   Prior to Admission Medications     Prescriptions Last Dose Informant Patient Reported? Taking?    atorvastatin (LIPITOR) 80 MG tablet 10/16/2017 Family Member No Yes    Take 0.5 tablets by mouth Daily.    Patient taking differently:  Take 80 mg by mouth Every Night.    clonazePAM (KlonoPIN) 0.5 MG tablet 10/16/2017 Family Member No Yes    Take 1 tablet by mouth 2 (Two) Times a Day As Needed for Anxiety.    escitalopram (LEXAPRO) 10 MG tablet 10/16/2017 Family Member Yes Yes    Take 10 mg by mouth Every Night.    levothyroxine (LEVOTHROID) 25 MCG tablet 10/16/2017 Family Member No Yes    Take 1 tablet by mouth Daily.    Patient taking differently:  Take 25 mcg by mouth Every Night.    Melatonin 3 MG tablet dispersible 10/16/2017 Family Member Yes Yes    Take 1 tablet by mouth Every Night.    metFORMIN (GLUCOPHAGE) 500 MG tablet 10/16/2017 Family Member No Yes    Take 0.5 tablets by mouth Daily With Breakfast.    Patient taking differently:  Take 250 mg  by mouth Every Night.    NAMZARIC 28-10 MG capsule sustained-release 24 hr 10/16/2017 Family Member Yes Yes    Take 1 tablet by mouth Every Night.    topiramate (TOPAMAX) 25 MG tablet 10/16/2017 Family Member No Yes    Take 1 tablet by mouth 2 (Two) Times a Day.    Patient taking differently:  Take 25 mg by mouth Every Night. Prescribed bid but patient caregiver/family member says she only takes it once a day    Vortioxetine HBr 10 MG tablet 10/16/2017 Family Member No Yes    Take 10 mg by mouth Daily.    Patient taking differently:  Take 10 mg by mouth Every Night.        Hospital Scheduled Meds:    ceftriaxone 1 g Intravenous Q24H   heparin (porcine) 5,000 Units Subcutaneous Q12H   insulin aspart 0-7 Units Subcutaneous 4x Daily AC & at Bedtime       sodium chloride 100 mL/hr Last Rate: 100 mL/hr (10/17/17 3193)     ---------------------------------------------------------------------------------------------------------------------   Vital Signs:  Temp:  [98 °F (36.7 °C)-98.7 °F (37.1 °C)] 98 °F (36.7 °C)  Heart Rate:  [] 68  Resp:  [18-20] 18  BP: (133-163)/(65-88) 163/82  Last 3 weights    10/17/17  1758 10/17/17  2146   Weight: 190 lb (86.2 kg) 179 lb 8 oz (81.4 kg)     Body mass index is 28.97 kg/(m^2).  ---------------------------------------------------------------------------------------------------------------------   Physical Exam:  Constitutional:  Well-developed and well-nourished.  No respiratory distress.      HENT:  Head: Normocephalic and atraumatic.  Mouth:  Dry mucous membranes.    Eyes:  Conjunctivae and EOM are normal.  Pupils are equal, round, and reactive to light.  No scleral icterus.  Neck:  Neck supple.  No JVD present.    Cardiovascular:  Normal rate, regular rhythm and normal heart sounds with no murmur.  Pulmonary/Chest:  No respiratory distress, no wheezes, no crackles, with normal breath sounds and good air movement.  Abdominal:  Soft.  Bowel sounds are normal.  No distension and  no tenderness.   Musculoskeletal:  No edema, no tenderness, and no deformity.  No red or swollen joints anywhere.    Neurological:  Patient is awake and alert but disoriented.  No cranial nerve deficit.  No tongue deviation.  No facial droop.  No slurred speech.   Skin:  Skin is warm and dry.  No rash noted.  No pallor.   Psychiatric:  Normal mood and affect.  Behavior is normal.  Judgment and thought content normal.   Peripheral vascular:  No edema and strong pulses on all 4 extremities.  Genitourinary: Deferred.    ---------------------------------------------------------------------------------------------------------------------  EKG:    Telemetry:    I have personally looked at both the EKG and the telemetry strips.  ---------------------------------------------------------------------------------------------------------------------     Results from last 7 days  Lab Units 10/17/17  1818   CRP mg/dL 2.28*   LACTATE mmol/L 2.0   WBC 10*3/mm3 12.03   HEMOGLOBIN g/dL 14.5   HEMATOCRIT % 45.6   MCV fL 93.4   MCHC g/dL 31.8*   PLATELETS 10*3/mm3 188           Results from last 7 days  Lab Units 10/17/17  1818   SODIUM mmol/L 142   POTASSIUM mmol/L 3.5   CHLORIDE mmol/L 108   CO2 mmol/L 23.9*   BUN mg/dL 28*   CREATININE mg/dL 1.21   EGFR IF NONAFRICN AM mL/min/1.73 43*   CALCIUM mg/dL 10.5*   GLUCOSE mg/dL 153*   ALBUMIN g/dL 4.50   BILIRUBIN mg/dL 1.6   ALK PHOS U/L 113*   AST (SGOT) U/L 30   ALT (SGPT) U/L 23   Estimated Creatinine Clearance: 41.9 mL/min (by C-G formula based on Cr of 1.21).  No results found for: AMMONIA    Results from last 7 days  Lab Units 10/17/17  1818   TROPONIN I ng/mL 0.040         Lab Results   Component Value Date    HGBA1C 6.20 (H) 06/12/2017     Lab Results   Component Value Date    TSH 1.199 06/12/2017    FREET4 1.16 06/12/2017     No results found for: PREGTESTUR, PREGSERUM, HCG, HCGQUANT  Pain Management Panel     There is no flowsheet data to display.                         ---------------------------------------------------------------------------------------------------------------------  Imaging Results (last 7 days)     Procedure Component Value Units Date/Time    XR Chest 1 View [[id number]] Resulted:  10/17/17 1859     Updated:  10/17/17 1859          I have personally reviewed the radiology images and read the final radiology report.  ---------------------------------------------------------------------------------------------------------------------  Assessment and Plan:  1.  Physical deconditioning and self-care deficits.  Family is requesting possible rehabilitation or nursing home placement.,  Increasingly difficult for family to take care of patient at home.  We will consult social service for evaluation and recommendations.  2.  Dehydration.This is most likely secondary to poor oral intakes.  We will start gentle hydration.  3.  Urinary tract infection.  Patient is started on IV Rocephin and will follow up urine culture.  4.  Mild hypercalcemia.  Possibly secondary to dehydration.  We will check calcium level in the morning.  4.  Dementia.  Supportive care.  5.  Diabetes mellitus.  Continue finger stick glucose monitoring with sliding scale protocol.  Encourage patient to eat.  6.  DVT prophylaxis.  Bilateral SCDs.    Mani Heredia MD  10/18/17  12:27 AM     Electronically signed by Mani Heredia MD at 10/18/2017 12:53 AM        Vital Signs (last 24 hours)       10/17 0700  -  10/18 0659 10/18 0700  -  10/18 1433   Most Recent    Temp (°F) 97.7 -  98.7      98.3     98.3 (36.8)    Heart Rate 65 -  102      62     62    Resp 18 - 20      18     18    /84 -  163/82      153/73     153/73    SpO2 (%) (!)86 -  99      96     96          Intake & Output (last day)       10/17 0701 - 10/18 0700 10/18 0701 - 10/19 0700    P.O.  240    I.V. (mL/kg) 990 (12.2)     IV Piggyback 1100     Total Intake(mL/kg) 2090 (25.7) 240 (2.9)    Net +2090 +240          Unmeasured Urine  Occurrence 5 x 3 x    Unmeasured Stool Occurrence 1 x 3 x        Hospital Medications (active)       Dose Frequency Start End    acetaminophen (TYLENOL) tablet 650 mg 650 mg Every 4 Hours PRN 10/17/2017     Sig - Route: Take 2 tablets by mouth Every 4 (Four) Hours As Needed for Mild Pain  or Headache. - Oral    atorvastatin (LIPITOR) tablet 40 mg 40 mg Nightly 10/18/2017     Sig - Route: Take 1 tablet by mouth Every Night. - Oral    bisacodyl (DULCOLAX) suppository 10 mg 10 mg Daily PRN 10/17/2017     Sig - Route: Insert 1 suppository into the rectum Daily As Needed for Constipation. - Rectal    cefTRIAXone (ROCEPHIN) 1 g/100 mL 0.9% NS (MBP) 1 g Once 10/17/2017 10/17/2017    Sig - Route: Infuse 100 mL into a venous catheter 1 (One) Time. - Intravenous    cefTRIAXone (ROCEPHIN) 1 g/100 mL 0.9% NS (MBP) 1 g Every 24 Hours 10/18/2017     Sig - Route: Infuse 100 mL into a venous catheter Daily. - Intravenous    clonazePAM (KlonoPIN) tablet 0.5 mg 0.5 mg 2 Times Daily PRN 10/18/2017     Sig - Route: Take 1 tablet by mouth 2 (Two) Times a Day As Needed for Anxiety. - Oral    dextrose (D50W) solution 25 g 25 g Every 15 Minutes PRN 10/17/2017     Sig - Route: Infuse 50 mL into a venous catheter Every 15 (Fifteen) Minutes As Needed for Low Blood Sugar (Blood Sugar Less Than 70, Patient Has IV Access - Unresponsive, NPO or Unable To Safely Swallow). - Intravenous    dextrose (GLUTOSE) oral gel 15 g 15 g Every 15 Minutes PRN 10/17/2017     Sig - Route: Take 15 g by mouth Every 15 (Fifteen) Minutes As Needed for Low Blood Sugar (Blood Sugar Less Than 70, Patient Alert, Is Not NPO & Can Safely Swallow). - Oral    donepezil (ARICEPT) tablet 10 mg 10 mg Nightly 10/18/2017     Sig - Route: Take 2 tablets by mouth Every Night. - Oral    escitalopram (LEXAPRO) tablet 10 mg 10 mg Nightly 10/18/2017     Sig - Route: Take 1 tablet by mouth Every Night. - Oral    glucagon (GLUCAGEN) injection 1 mg 1 mg Every 15 Minutes PRN 10/17/2017   "   Sig - Route: Inject 1 mg under the skin Every 15 (Fifteen) Minutes As Needed (Blood Glucose Less Than 70 - Patient Without IV Access - Unresponsive, NPO or Unable To Safely Swallow). - Subcutaneous    heparin (porcine) 5000 UNIT/ML injection 5,000 Units 5,000 Units Every 12 Hours Scheduled 10/17/2017     Sig - Route: Inject 1 mL under the skin Every 12 (Twelve) Hours. - Subcutaneous    influenza vac split quad (FLUZONE,FLUARIX,AFLURIA) injection 0.5 mL 0.5 mL During Hospitalization 10/17/2017     Sig - Route: Inject 0.5 mL into the shoulder, thigh, or buttocks During Hospitalization for Immunization. - Intramuscular    insulin aspart (novoLOG) injection 0-7 Units 0-7 Units 4 Times Daily Before Meals & Nightly 10/18/2017     Sig - Route: Inject 0-7 Units under the skin 4 (Four) Times a Day Before Meals & at Bedtime. - Subcutaneous    levothyroxine (SYNTHROID, LEVOTHROID) tablet 25 mcg 25 mcg Nightly 10/18/2017     Sig - Route: Take 1 tablet by mouth Every Night. - Oral    Cosign for Ordering: Required by Yusef Rivera, DO    memantine (NAMENDA) tablet 10 mg 10 mg Every 12 Hours Scheduled 10/18/2017     Sig - Route: Take 1 tablet by mouth Every 12 (Twelve) Hours. - Oral    ondansetron (ZOFRAN) tablet 4 mg 4 mg Every 6 Hours PRN 10/17/2017     Sig - Route: Take 1 tablet by mouth Every 6 (Six) Hours As Needed for Nausea or Vomiting. - Oral    sodium chloride 0.9 % bolus 1,000 mL 1,000 mL Once 10/17/2017 10/17/2017    Sig - Route: Infuse 1,000 mL into a venous catheter 1 (One) Time. - Intravenous    sodium chloride 0.9 % flush 1-10 mL 1-10 mL As Needed 10/17/2017     Sig - Route: Infuse 1-10 mL into a venous catheter As Needed for Line Care. - Intravenous    sodium chloride 0.9 % flush 10 mL 10 mL As Needed 10/17/2017     Sig - Route: Infuse 10 mL into a venous catheter As Needed for Line Care. - Intravenous    Linked Group 1:  \"And\" Linked Group Details        sodium chloride 0.9 % infusion 100 mL/hr " Continuous 10/17/2017     Sig - Route: Infuse 100 mL/hr into a venous catheter Continuous. - Intravenous    Vortioxetine HBr tablet 10 mg 10 mg Nightly 10/18/2017     Sig - Route: Take 10 mg by mouth Every Night. - Oral    Non-formulary Exception Code: Patient supplied medication    glucagon (human recombinant) (GLUCAGEN DIAGNOSTIC) injection 1 mg (Discontinued) 1 mg Every 15 Minutes PRN 10/17/2017 10/17/2017    Sig - Route: Inject 1 mg under the skin Every 15 (Fifteen) Minutes As Needed (Blood Glucose Less Than 70 - Patient Without IV Access - Unresponsive, NPO or Unable To Safely Swallow). - Subcutaneous    Reason for Discontinue: Reorder    Memantine HCl-Donepezil HCl 28-10 MG capsule sustained-release 24 hr 1 tablet (Discontinued) 1 tablet Nightly 10/18/2017 10/18/2017    Sig - Route: Take 1 tablet by mouth Every Night. - Oral    Notes to Pharmacy: formerly [facility] split at verification    Reason for Discontinue: Formulary change    Memantine HCl-Donepezil HCl 28-10 MG capsule sustained-release 24 hr 1 tablet (Discontinued) 1 tablet Nightly 10/18/2017 10/18/2017    Sig - Route: Take 1 tablet by mouth Every Night. - Oral    Notes to Pharmacy: formerly [facility] split at verification    Reason for Discontinue: Formulary change    PATIENT SUPPLIED MEDICATION (Discontinued) 10 mg Daily 10/18/2017 10/18/2017    Sig - Route: Take 10 mg by mouth Daily. - Oral    Reason for Discontinue: Formulary change    Cosign for Ordering: Required by Yusef Rivera, DO    sodium chloride 0.9 % bolus 500 mL (Discontinued) 500 mL Once 10/17/2017 10/17/2017    Sig - Route: Infuse 500 mL into a venous catheter 1 (One) Time. - Intravenous            Lab Results (last 24 hours)     Procedure Component Value Units Date/Time    CBC & Differential [[id number]] Collected:  10/17/17 1818    Specimen:  Blood Updated:  10/17/17 1831    Narrative:       The following orders were created for panel order CBC & Differential.  Procedure                                Abnormality         Status                     ---------                               -----------         ------                     CBC Auto Differential[[id number]]        Abnormal            Final result                 Please view results for these tests on the individual orders.    CBC Auto Differential [[id number]]  (Abnormal) Collected:  10/17/17 1818    Specimen:  Blood from Arm, Right Updated:  10/17/17 1831     WBC 12.03 10*3/mm3      RBC 4.88 10*6/mm3      Hemoglobin 14.5 g/dL      Hematocrit 45.6 %      MCV 93.4 fL      MCH 29.7 pg      MCHC 31.8 (L) g/dL      RDW 14.3 %      RDW-SD 47.0 fl      MPV 11.6 (H) fL      Platelets 188 10*3/mm3      Neutrophil % 77.4 (H) %      Lymphocyte % 15.3 (L) %      Monocyte % 6.2 %      Eosinophil % 0.7 %      Basophil % 0.2 %      Immature Grans % 0.2 %      Neutrophils, Absolute 9.31 (H) 10*3/mm3      Lymphocytes, Absolute 1.84 10*3/mm3      Monocytes, Absolute 0.74 10*3/mm3      Eosinophils, Absolute 0.08 10*3/mm3      Basophils, Absolute 0.03 10*3/mm3      Immature Grans, Absolute 0.03 10*3/mm3     Sedimentation Rate [[id number]]  (Normal) Collected:  10/17/17 1818    Specimen:  Blood from Arm, Right Updated:  10/17/17 1839     Sed Rate 21 mm/hr     Lactic Acid, Plasma [[id number]]  (Normal) Collected:  10/17/17 1818    Specimen:  Blood from Arm, Right Updated:  10/17/17 1846     Lactate 2.0 mmol/L     C-reactive Protein [[id number]]  (Abnormal) Collected:  10/17/17 1818    Specimen:  Blood from Arm, Right Updated:  10/17/17 1852     C-Reactive Protein 2.28 (H) mg/dL     BNP [[id number]]  (Normal) Collected:  10/17/17 1818    Specimen:  Blood from Arm, Right Updated:  10/17/17 1900     BNP 18.0 pg/mL     Troponin [[id number]]  (Normal) Collected:  10/17/17 1818    Specimen:  Blood from Arm, Right Updated:  10/17/17 1902     Troponin I 0.040 ng/mL     Narrative:       Ultra Troponin I Reference Range:         <=0.039 ng/mL: Negative    0.04-0.779 ng/mL: Indeterminate  Range. Suspicious of MI.  Clinical correlation required.       >=0.78  ng/mL: Consistent with myocardial injury.  Clinical correlation required.    Comprehensive Metabolic Panel [[id number]]  (Abnormal) Collected:  10/17/17 1818    Specimen:  Blood from Arm, Right Updated:  10/17/17 1911     Glucose 153 (H) mg/dL       1+ Icteric         BUN 28 (H) mg/dL      Creatinine 1.21 mg/dL      Sodium 142 mmol/L      Potassium 3.5 mmol/L      Chloride 108 mmol/L      CO2 23.9 (L) mmol/L      Calcium 10.5 (H) mg/dL      Total Protein 8.2 (H) g/dL      Albumin 4.50 g/dL      ALT (SGPT) 23 U/L      AST (SGOT) 30 U/L      Alkaline Phosphatase 113 (H) U/L       Note New Reference Ranges        Total Bilirubin 1.6 mg/dL      eGFR Non African Amer 43 (L) mL/min/1.73      Globulin 3.7 gm/dL      A/G Ratio 1.2 (L) g/dL      BUN/Creatinine Ratio 23.1     Anion Gap 10.1 mmol/L     Narrative:       The MDRD GFR formula is only valid for adults with stable renal function between ages 18 and 70.    Osmolality, Calculated [[id number]]  (Normal) Collected:  10/17/17 1818    Specimen:  Blood from Arm, Right Updated:  10/17/17 1911     Osmolality Calc 291.6 mOsm/kg     Urine Culture - Urine, Urine, Clean Catch [[id number]] Collected:  10/17/17 1925    Specimen:  Urine from Urine, Catheter Updated:  10/17/17 1931    Urinalysis With / Culture If Indicated - Urine, Clean Catch [[id number]]  (Abnormal) Collected:  10/17/17 1925    Specimen:  Urine from Urine, Catheter Updated:  10/17/17 1935     Color, UA Dark Yellow (A)     Appearance, UA Cloudy (A)     pH, UA 5.5     Specific Gravity, UA 1.028     Glucose, UA Negative     Ketones, UA Trace (A)     Bilirubin, UA Negative     Blood, UA Negative     Protein, UA Negative     Leuk Esterase, UA Moderate (2+) (A)     Nitrite, UA Positive (A)     Urobilinogen, UA 1.0 E.U./dL    Urinalysis, Microscopic Only - Urine, Clean Catch [[id number]]  (Abnormal) Collected:  10/17/17 1925    Specimen:  Urine from  Urine, Catheter Updated:  10/17/17 1943     RBC, UA 0-2 /HPF      WBC, UA 7-12 (A) /HPF      Bacteria, UA 4+ (A) /HPF      Squamous Epithelial Cells, UA 3-6 (A) /HPF      Yeast, UA Small/1+ Yeast /HPF      Hyaline Casts, UA 3-6 /LPF      Methodology Manual Light Microscopy    POC Glucose Fingerstick [[id number]]  (Normal) Collected:  10/17/17 2356    Specimen:  Blood Updated:  10/18/17 0002     Glucose 108 mg/dL     Narrative:       Meter: [id number] : [id number] ben banks    CBC Auto Differential [[id number]]  (Abnormal) Collected:  10/18/17 0102    Specimen:  Blood Updated:  10/18/17 0136     WBC 7.85 10*3/mm3      RBC 4.26 10*6/mm3      Hemoglobin 12.8 g/dL      Hematocrit 40.7 %      MCV 95.5 (H) fL      MCH 30.0 pg      MCHC 31.4 (L) g/dL      RDW 14.0 %      RDW-SD 46.1 fl      MPV 11.7 (H) fL      Platelets 158 10*3/mm3      Neutrophil % 65.5 %      Lymphocyte % 27.0 %      Monocyte % 6.4 %      Eosinophil % 0.9 %      Basophil % 0.1 %      Immature Grans % 0.1 %      Neutrophils, Absolute 5.14 10*3/mm3      Lymphocytes, Absolute 2.12 10*3/mm3      Monocytes, Absolute 0.50 10*3/mm3      Eosinophils, Absolute 0.07 10*3/mm3      Basophils, Absolute 0.01 10*3/mm3      Immature Grans, Absolute 0.01 10*3/mm3     Basic Metabolic Panel [[id number]]  (Abnormal) Collected:  10/18/17 0102    Specimen:  Blood Updated:  10/18/17 0322     Glucose 120 (H) mg/dL      BUN 23 (H) mg/dL      Creatinine 0.94 mg/dL      Sodium 143 mmol/L      Potassium 3.8 mmol/L       1+ Hemolysis         Chloride 113 (H) mmol/L      CO2 23.8 (L) mmol/L      Calcium 9.2 mg/dL      eGFR Non African Amer 58 (L) mL/min/1.73      BUN/Creatinine Ratio 24.5     Anion Gap 6.2 mmol/L     Narrative:       The MDRD GFR formula is only valid for adults with stable renal function between ages 18 and 70.    Osmolality, Calculated [[id number]]  (Normal) Collected:  10/18/17 0102    Specimen:  Blood Updated:  10/18/17 0322     Osmolality Calc 289.9  mOsm/kg     Blood Culture - Blood, [[id number]]  (Normal) Collected:  10/17/17 1818    Specimen:  Blood from Arm, Right Updated:  10/18/17 0631     Blood Culture No growth at less than 24 hours    Blood Culture - Blood, [[id number]]  (Normal) Collected:  10/17/17 1848    Specimen:  Blood from Hand, Left Updated:  10/18/17 0701     Blood Culture No growth at less than 24 hours    Hemoglobin A1c [[id number]]  (Abnormal) Collected:  10/18/17 1128    Specimen:  Blood Updated:  10/18/17 1218     Hemoglobin A1C 5.80 (H) %     TSH [[id number]]  (Normal) Collected:  10/18/17 1128    Specimen:  Blood Updated:  10/18/17 1234     TSH 2.989 mIU/mL         Imaging Results (last 24 hours)     Procedure Component Value Units Date/Time    XR Chest 1 View [[id number]] Collected:  10/18/17 0627     Updated:  10/18/17 0629    Narrative:       EXAMINATION: XR CHEST 1 VW-      CLINICAL INDICATION:     weakness and confusion     TECHNIQUE:  XR CHEST 1 VW-      COMPARISON: 11/22/2016      FINDINGS:   Lungs are aerated.   Heart and mediastinal contours are unremarkable.   No pneumothorax.   No pleural effusion.   No acute osseous findings.            Impression:       No radiographic evidence of acute cardiac or pulmonary  disease.     This report was finalized on 10/18/2017 6:27 AM by Dr. Nate Thomas MD.           ECG/EMG Results (last 24 hours)     Procedure Component Value Units Date/Time    ECG 12 Lead [[id number]] Collected:  10/17/17 1813     Updated:  10/17/17 1816             Physician Progress Notes (last 24 hours) (Notes from 10/17/2017  2:33 PM through 10/18/2017  2:33 PM)      Yusef Rivera, DO at 10/18/2017 12:54 PM  Version 1 of 1         Subjective     History:   Luz Beasley is a 77 y.o. female admitted on 10/17/2017 secondary to UTI (urinary tract infection)     Procedures: None    Patient seen and examined with CHRISTINE Burgos. Awake and alert but confused. Oriented to person only. States she has to use the bathroom.  Denies any other complaints but her confusion makes obtaining an accurate history very difficult. No acute events overnight per RN.     History taken from: chart, and RN.      Objective     Vital Signs  Temp:  [97.7 °F (36.5 °C)-98.7 °F (37.1 °C)] 98.3 °F (36.8 °C)  Heart Rate:  [] 62  Resp:  [18-20] 18  BP: (133-163)/(65-88) 153/73    Intake/Output Summary (Last 24 hours) at 10/18/17 1255  Last data filed at 10/18/17 0834   Gross per 24 hour   Intake             2330 ml   Output                0 ml   Net             2330 ml         Physical Exam:  General:    Awake, alert but confused (oriented to person only), in no acute distress, able to follow simple commands    Heart:      Normal S1 and S2. Regular rate and rhythm. No significant murmur, rubs or gallops appreciated.   Lungs:     Respirations regular, even and unlabored. Lungs clear to auscultation B/L. No wheezes, rales or rhonchi.   Abdomen:   Soft and nontender. No guarding, rebound tenderness or  organomegaly noted. Bowel sounds present x 4.   Extremities:  No clubbing, cyanosis or edema noted. Moves UE and LE equally B/L.     Results Review:      Results from last 7 days  Lab Units 10/18/17  0102 10/17/17  1818   WBC 10*3/mm3 7.85 12.03   HEMOGLOBIN g/dL 12.8 14.5   PLATELETS 10*3/mm3 158 188       Results from last 7 days  Lab Units 10/18/17  0102 10/17/17  1818   SODIUM mmol/L 143 142   POTASSIUM mmol/L 3.8 3.5   CHLORIDE mmol/L 113* 108   CO2 mmol/L 23.8* 23.9*   BUN mg/dL 23* 28*   CREATININE mg/dL 0.94 1.21   CALCIUM mg/dL 9.2 10.5*   GLUCOSE mg/dL 120* 153*       Results from last 7 days  Lab Units 10/17/17  1818   BILIRUBIN mg/dL 1.6   ALK PHOS U/L 113*   AST (SGOT) U/L 30   ALT (SGPT) U/L 23               Results from last 7 days  Lab Units 10/17/17  1818   TROPONIN I ng/mL 0.040       Imaging Results (last 24 hours)     Procedure Component Value Units Date/Time    XR Chest 1 View [[id number]] Collected:  10/18/17 0627     Updated:  10/18/17  0629    Narrative:       EXAMINATION: XR CHEST 1 VW-      CLINICAL INDICATION:     weakness and confusion     TECHNIQUE:  XR CHEST 1 VW-      COMPARISON: 11/22/2016      FINDINGS:   Lungs are aerated.   Heart and mediastinal contours are unremarkable.   No pneumothorax.   No pleural effusion.   No acute osseous findings.            Impression:       No radiographic evidence of acute cardiac or pulmonary  disease.     This report was finalized on 10/18/2017 6:27 AM by Dr. Nate Thomas MD.               Medications:    atorvastatin 40 mg Oral Nightly   ceftriaxone 1 g Intravenous Q24H   donepezil 10 mg Oral Nightly   escitalopram 10 mg Oral Nightly   heparin (porcine) 5,000 Units Subcutaneous Q12H   insulin aspart 0-7 Units Subcutaneous 4x Daily AC & at Bedtime   levothyroxine 25 mcg Oral Nightly   memantine 10 mg Oral Q12H   Vortioxetine HBr 10 mg Oral Nightly       sodium chloride 100 mL/hr Last Rate: 100 mL/hr (10/18/17 0842)           Assessment/Plan   Severe sepsis: Pt met sepsis criteria upon admission with HR>90 and elevated CRP. Likely 2/2 UTI. Evidence of end organ dysfunction with reported worsening confusion superimposed on her baseline dementia. Currently afebrile and hemodynamically stable. Lactate normal upon admission. WBC remains stable. Currently on Rocephin. Cont IVF's, follow cultures and repeat labs in the AM.     UTI: Cont Rocephin as outlined above.     Metabolic encephalopathy superimposed on baseline dementia: Discussed pt's care with her granddaughter with whom she lives who reports recent worsening confusion. Will order CT head. TSH is normal. Cont treatment of sepsis as outlined above. Cont home medication regimen and supportive treatment.     Dehydration: BUN/Cr ratio>20. Cont IVF's. Repeat labs in the AM.     Mild hypercalcemia: Likely 2/2 dehydration. Resolved this AM. Cont IVF's and repeat labs in the AM.     Generalized weakness/debility: Cont treatment as outlined above. PT/OT  consulted. Pt's family interested in rehab placement and SS has been consulted.     Hypothyroidism: TSH is normal. Cont home Synthroid.     DM II, non-insulin dependent: HgbA1c is 5.8. Holding home metformin. Cont SSI with Accuchecks.     DVT PPX: SQ heparin    Pt is at high risk 2/2 severe sepsis, UTI, metabolic encephalopathy superimposed on baseline dementia, dehydration and debility.         Yusef Rivera DO  10/18/17  12:55 PM     Electronically signed by Yusef Rivera DO at 10/18/2017  1:05 PM        Physical Therapy Notes (last 24 hours) (Notes from 10/17/2017  2:33 PM through 10/18/2017  2:33 PM)     No notes of this type exist for this encounter.        Occupational Therapy Notes (last 24 hours) (Notes from 10/17/2017  2:33 PM through 10/18/2017  2:33 PM)     No notes of this type exist for this encounter.

## 2017-10-18 NOTE — PLAN OF CARE
Problem: Inpatient Physical Therapy  Goal: Bed Mobility Goal LTG- PT    10/18/17 1647   Bed Mobility PT LTG   Bed Mobility PT LTG, Date Established 10/18/17   Bed Mobility PT LTG, Time to Achieve by discharge   Bed Mobility PT LTG, Activity Type supine to sit/sit to supine;roll left/roll right   Bed Mobility PT LTG, [place] Level moderate assist (50% patient effort)   Bed Mobility PT Goal LTG, Assist Device bed rails       Goal: Transfer Training Goal 1 LTG- PT    10/18/17 1647   Transfer Training PT LTG   Transfer Training PT LTG, Date Established 10/18/17   Transfer Training PT LTG, Time to Achieve by discharge   Transfer Training PT LTG, Activity Type bed to chair /chair to bed;sit to stand/stand to sit   Transfer Training PT LTG, [place] Level moderate assist (50% patient effort)   Transfer Training PT LTG, Assist Device walker, rolling;other (see comments)  (or appropriate a.d.)       Goal: Gait Training Goal LTG- PT    10/18/17 1647   Gait Training PT LTG   Gait Training Goal PT LTG, Date Established 10/18/17   Gait Training Goal PT LTG, Time to Achieve by discharge   Gait Training Goal PT LTG, [place] Level moderate assist (50% patient effort);2 person assist required   Gait Training Goal PT LTG, Assist Device walker, rolling;other (see comments)  (or appropriate a.d./ HHA )   Gait Training Goal PT LTG, Distance to Achieve 10

## 2017-10-18 NOTE — PROGRESS NOTES
Discharge Planning Assessment  [facility]     Patient Name: Luz Beasley  MRN: [id number]  Today's Date: 10/18/2017    Admit Date: 10/17/2017    Discharge Plan       10/18/17 1448    Case Management/Social Work Plan    Plan SS spoke with pt's guardian, Sophia Craft who request pt admitted to a local nursing facility for short term rehab. Sophia states pt has been recently falling at home and it is becoming more difficult to care for her. Sophia has requested [facility] or [facility]. SS [facility] Shahida who states she has a bed available and is agreeable to review pt's information. [facility] Georges who states he may have a bed available and is agreeable to review pt's information. SS faxed pt's information to [facility] and [facility]. SS will continue to follow.     1515: SS received call back from [facility] per Suad who states they are agreeable to accept pt after she has had her three night stay. SS made Dr. Rivera and pt's guardian, Sophia aware. SS will continue to follow.     Patient/Family In Agreement With Plan yes     Patt Salgado

## 2017-10-18 NOTE — THERAPY EVALUATION
Acute Care - Physical Therapy Initial Evaluation  [facility]     Patient Name: Luz Beasley  : 1940  MRN: [id number]  Today's Date: 10/18/2017   Onset of Illness/Injury or Date of Surgery Date: 10/17/17  Date of Referral to PT: 10/17/17  Referring Physician: Dr. Heredia      Admit Date: 10/17/2017     Visit Dx:    ICD-10-CM ICD-9-CM   1. Acute cystitis without hematuria N30.00 595.0   2. 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 Physical debility R53.81 799.3     Patient Active Problem List   Diagnosis   • Diabetes mellitus*   • Hyperlipemia*   • Senile dementia*   • Vitamin D deficiency*   • Essential hypertension   • Obesity*   • Hyperparathyroidism status post parathyroidectomy*   • Arthritis   • Loss of balance   • Acquired hypothyroidism   • Anxiety   • Cluster headaches   • UTI (urinary tract infection)   • Sepsis     Past Medical History:   Diagnosis Date   • Dementia    • Depression    • Diabetes mellitus    • Hyperlipidemia    • Hypertension    • Obesity    • Senile dementia    • Vitamin D deficiency disease      Past Surgical History:   Procedure Laterality Date   • CARDIAC CATHETERIZATION  2006   • CARDIOVASCULAR STRESS TEST  2006   • COLONOSCOPY  2008   • HYSTERECTOMY     • THYROID SURGERY            PT ASSESSMENT (last 72 hours)      PT Evaluation       10/18/17 1630 10/17/17 2213    Rehab Evaluation    Document Type evaluation  -AG     Subjective Information agree to therapy;complains of;fatigue  -AG     Patient Effort, Rehab Treatment fair  -AG     Symptoms Noted During/After Treatment fatigue   pt. remained drowsy throughout encounter  -AG     General Information    Patient Profile Review yes  -AG     Onset of Illness/Injury or Date of Surgery Date 10/17/17  -AG     Referring Physician Dr. Heredia  -AG     General Observations pt. in R sidelying; difficult to awaken; decr arousal throughout session.  Pt. c/o discomfort with movement of B LE  -AG     Pertinent  History Of Current Problem pt. underwent parathyroidectomy; presents with dementia, physical deconditioning.  -AG     Precautions/Limitations fall precautions;oxygen therapy device and L/min  -AG     Prior Level of Function --   unable to ascertain from patient.  -     Equipment Currently Used at Home walker, standard  -     Plans/Goals Discussed With patient;agreed upon  -AG     Risks Reviewed patient:;dizziness;increased discomfort  -AG     Benefits Reviewed patient:;improve function;increase independence;increase strength;increase balance;increase knowledge  -AG     Living Environment    Lives With child(pedro), adult  -AG child(pedro), adult  -    Living Arrangements mobile home  - mobile home  -    Home Accessibility stairs (2 railings present)  - stairs (2 railings present)  -    Stair Railings at Home outside, present at both sides  - outside, present at both sides  -    Type of Financial/Environmental Concern  not enough insurance   applied for extra insurance today, will take 30 days [facility]   -    Transportation Available  ambulance  -    Clinical Impression    Date of Referral to PT 10/17/17  -AG     PT Diagnosis impaired functional mobility; decr strength  -AG     Prognosis fair to poor based on today's assessment and considering pt's decr alertness  -     Functional Level At Time Of Evaluation Max A x 2  -AG     Patient/Family Goals Statement --   pt. unable to formulate goal  -AG     Criteria for Skilled Therapeutic Interventions Met yes;treatment indicated  -AG     Pathology/Pathophysiology Noted (Describe Specifically for Each System) musculoskeletal  -AG     Impairments Found (describe specific impairments) aerobic capacity/endurance;arousal, attention, and cognition;gait, locomotion, and balance;joint integrity and mobility  -AG     Rehab Potential fair, will monitor progress closely  -AG     Predicted Duration of Therapy Intervention (days/wks) hospital LOS or until pt.  reaches maximum function  -AG     Pain Assessment    Pain Assessment Harvey-Bhakta FACES  -AG     Harvey-Bhakta FACES Pain Rating 2  -AG     Pain Location Leg  -AG     Pain Orientation Right;Left  -AG     Pain Frequency Intermittent   with movement of LE  -AG     Vision Assessment/Intervention    Visual Impairment --   unable to formally assess  -AG     Cognitive Assessment/Intervention    Current Cognitive/Communication Assessment impaired   pt. communicated minimally with therapist  -AG     Orientation Status oriented to;person  -AG     Follows Commands/Answers Questions 25% of the time;able to follow single-step instructions;needs cueing;needs increased time;needs repetition  -AG     Personal Safety decreased awareness, need for assist;decreased awareness, need for safety  -AG     Personal Safety Interventions fall prevention program maintained;gait belt;nonskid shoes/slippers when out of bed;supervised activity  -AG     ROM (Range of Motion)    General ROM --   PROM WFL B LE; pt. did not cooperate with exam  -AG     MMT (Manual Muscle Testing)    General MMT Assessment --   difficult to formally assess; pt. did not cooperate w/ exam  -AG     Muscle Tone Assessment    Muscle Tone Assessment --   functional tone  -AG     Mobility Assessment/Training    Extremity Weight-Bearing Status --   no weightbearing restrictions  -AG     Bed Mobility, Assessment/Treatment    Bed Mobility, Assistive Device bed rails;draw sheet  -AG     Bed Mobility, Roll Right, [place] verbal cues required;nonverbal cues required (demo/gesture);maximum assist (25% patient effort);2 person assist required  -AG     Bed Mobility, Scoot/Bridge, [place] maximum assist (25% patient effort);2 person assist required  -AG     Bed Mob, Supine to Sit, [place] verbal cues required;nonverbal cues required (demo/gesture);maximum assist (25% patient effort);2 person assist required  -AG     Bed Mob, Sit to Supine, [place] verbal cues  required;nonverbal cues required (demo/gesture);maximum assist (25% patient effort);2 person assist required  -AG     Bed Mobility, Safety Issues cognitive deficits limit understanding;decreased use of arms for pushing/pulling;decreased use of legs for bridging/pushing  -AG     Bed Mobility, Impairments strength decreased  -AG     Transfer Assessment/Treatment    Transfers, Bed-Chair [place] --   pt. remained drowsy  -AG     Transfer, Comment --   pt deemed unsafe to sit in chair without close supervision  -AG     Gait Assessment/Treatment    Gait, [place] Level unable to perform  -AG     Motor Skills/Interventions    Additional Documentation Balance Skills Training (Group)  -AG     Balance Skills Training    Sitting-Level of Assistance Minimum assistance;Moderate assistance  -AG     Sitting-Balance Support Feet supported  -AG     Sitting-Balance Activities --   trunk, cervical ext/ upright posture  -AG     Sitting # of Minutes 3-4 minutes  -AG     Positioning and Restraints    Pre-Treatment Position in bed  -AG     Post Treatment Position bed  -AG     In Bed supine;call light within reach;encouraged to call for assist;exit alarm on;side rails up x3  -       10/17/17 6978       General Information    Equipment Currently Used at Home walker, standard  -       User Key  (r) = Recorded By, (t) = Taken By, (c) = Cosigned By    Initials Name Provider Type    AG Shahida Richardson, PT Physical Therapist    JOSE A Olea V, RN Registered Nurse          Physical Therapy Education     Title: PT OT SLP Therapies (Active)     Topic: Physical Therapy (Active)     Point: Mobility training (Active)    Learning Progress Summary    Learner Readiness Method Response Comment Documented by Status   Patient Acceptance E,D NR,NL  AG 10/18/17 4136 Active               Point: Home exercise program (Active)    Learning Progress Summary    Learner Readiness Method Response Comment Documented by Status   Patient Acceptance E,D  NR,NL   10/18/17 1646 Active               Point: Body mechanics (Active)    Learning Progress Summary    Learner Readiness Method Response Comment Documented by Status   Patient Acceptance E,D NR,NL   10/18/17 1646 Active               Point: Precautions (Active)    Learning Progress Summary    Learner Readiness Method Response Comment Documented by Status   Patient Acceptance E,D NR,NL   10/18/17 1646 Active                      User Key     Initials Effective Dates Name Provider Type Discipline     03/14/16 -  Shahida Richardson, PT Physical Therapist PT                PT Recommendation and Plan  Anticipated Equipment Needs At Discharge:  (TBD)  Anticipated Discharge Disposition: home with assist, home with home health, extended care facility  Planned Therapy Interventions: balance training, bed mobility training, gait training, home exercise program, neuromuscular re-education, patient/family education, ROM (Range of Motion), strengthening, transfer training  PT Frequency: 3-5 times/wk, per priority policy             IP PT Goals       10/18/17 1647          Bed Mobility PT LTG    Bed Mobility PT LTG, Date Established 10/18/17  -      Bed Mobility PT LTG, Time to Achieve by discharge  -AG      Bed Mobility PT LTG, Activity Type supine to sit/sit to supine;roll left/roll right  -AG      Bed Mobility PT LTG, [place] Level moderate assist (50% patient effort)  -AG      Bed Mobility PT Goal  LTG, Assist Device bed rails  -AG      Transfer Training PT LTG    Transfer Training PT LTG, Date Established 10/18/17  -      Transfer Training PT LTG, Time to Achieve by discharge  -AG      Transfer Training PT LTG, Activity Type bed to chair /chair to bed;sit to stand/stand to sit  -AG      Transfer Training PT LTG, [place] Level moderate assist (50% patient effort)  -AG      Transfer Training PT LTG, Assist Device walker, rolling;other (see comments)   or appropriate a.d.  -AG      Gait Training PT LTG     Gait Training Goal PT LTG, Date Established 10/18/17  -AG      Gait Training Goal PT LTG, Time to Achieve by discharge  -AG      Gait Training Goal PT LTG, [place] Level moderate assist (50% patient effort);2 person assist required  -AG      Gait Training Goal PT LTG, Assist Device walker, rolling;other (see comments)   or appropriate a.d./ HHA   -AG      Gait Training Goal PT LTG, Distance to Achieve 10  -AG        User Key  (r) = Recorded By, (t) = Taken By, (c) = Cosigned By    Initials Name Provider Type    MARLENE Richardson, PT Physical Therapist                Outcome Measures       10/18/17 1600          How much help from another person do you currently need...    Turning from your back to your side while in flat bed without using bedrails? 2  -AG      Moving from lying on back to sitting on the side of a flat bed without bedrails? 2  -AG      Moving to and from a bed to a chair (including a wheelchair)? 1  -AG      Standing up from a chair using your arms (e.g., wheelchair, bedside chair)? 1  -AG      Climbing 3-5 steps with a railing? 1  -AG      To walk in hospital room? 1  -AG      AM-PAC 6 Clicks Score 8  -AG      Functional Assessment    Outcome Measure Options AM-PAC 6 Clicks Basic Mobility (PT)  -AG        User Key  (r) = Recorded By, (t) = Taken By, (c) = Cosigned By    Initials Name Provider Type    MARLENE Richardson, PT Physical Therapist           Time Calculation:         PT Charges       10/18/17 1650          Time Calculation    PT Received On 10/18/17  -      PT - Next Appointment 10/19/17  -      PT Goal Re-Cert Due Date 11/01/17  -      Time Calculation- PT    Total Timed Code Minutes- PT 23 minute(s)  -AG      TCU Minutes- PT 23 min  -AG        User Key  (r) = Recorded By, (t) = Taken By, (c) = Cosigned By    Initials Name Provider Type    MARLENE Richardson PT Physical Therapist          Therapy Charges for Today     Code Description Service Date Service Provider Modifiers Qty     29861460496 HC PT MOBILITY CURRENT 10/18/2017 Shahida Richardson, PT GP, CM 1    16250849180 HC PT MOBILITY DISCHARGE 10/18/2017 Shahida Richardson, PT GP, CK 1    79295933997 HC PT THER SUPP EA 15 MIN 10/18/2017 Shahida Richardson, PT GP 2    80934599641 HC PT THERAPEUTIC ACT EA 15 MIN 10/18/2017 Shahida Richardson, PT GP 1    13814172271 HC PT EVAL HIGH COMPLEXITY 1 10/18/2017 Shahida Richardson, PT GP 1          PT G-Codes  Outcome Measure Options: AM-PAC 6 Clicks Basic Mobility (PT)  Score: 8  Functional Limitation: Mobility: Walking and moving around  Mobility: Walking and Moving Around Current Status (): At least 80 percent but less than 100 percent impaired, limited or restricted  Mobility: Walking and Moving Around Discharge Status (): At least 40 percent but less than 60 percent impaired, limited or restricted      Shahida Richardson, PT  10/18/2017

## 2017-10-18 NOTE — PLAN OF CARE
Problem: Fall Risk (Adult)  Goal: Identify Related Risk Factors and Signs and Symptoms  Outcome: Ongoing (interventions implemented as appropriate)  Goal: Absence of Falls  Outcome: Ongoing (interventions implemented as appropriate)    Problem: Infection, Risk/Actual (Adult)  Goal: Identify Related Risk Factors and Signs and Symptoms  Outcome: Ongoing (interventions implemented as appropriate)  Goal: Infection Prevention/Resolution  Outcome: Ongoing (interventions implemented as appropriate)    Problem: Pressure Ulcer Risk (Calin Scale) (Adult,Obstetrics,Pediatric)  Goal: Identify Related Risk Factors and Signs and Symptoms  Outcome: Ongoing (interventions implemented as appropriate)  Goal: Skin Integrity  Outcome: Ongoing (interventions implemented as appropriate)

## 2017-10-18 NOTE — PROGRESS NOTES
Nutrition Services    Patient Name:  Luz Beasley  YOB: 1940  MRN: [id number]  Admit Date:  10/17/2017    Note will change diet to regular, per protocol, due to poor intakes and recent wt loss.      Thank you    Electronically signed by:  Kiya Ferreira RD  10/18/17 6:18 PM

## 2017-10-19 LAB
ALBUMIN SERPL-MCNC: 3.4 G/DL (ref 3.4–4.8)
ALBUMIN/GLOB SERPL: 1.2 G/DL (ref 1.5–2.5)
ALP SERPL-CCNC: 86 U/L (ref 35–104)
ALT SERPL W P-5'-P-CCNC: 17 U/L (ref 10–36)
ANION GAP SERPL CALCULATED.3IONS-SCNC: 7.4 MMOL/L (ref 3.6–11.2)
AST SERPL-CCNC: 30 U/L (ref 10–30)
BASOPHILS # BLD AUTO: 0.01 10*3/MM3 (ref 0–0.3)
BASOPHILS NFR BLD AUTO: 0.1 % (ref 0–2)
BILIRUB SERPL-MCNC: 1.4 MG/DL (ref 0.2–1.8)
BUN BLD-MCNC: 14 MG/DL (ref 7–21)
BUN/CREAT SERPL: 19.2 (ref 7–25)
CALCIUM SPEC-SCNC: 8.8 MG/DL (ref 7.7–10)
CHLORIDE SERPL-SCNC: 112 MMOL/L (ref 99–112)
CO2 SERPL-SCNC: 20.6 MMOL/L (ref 24.3–31.9)
CREAT BLD-MCNC: 0.73 MG/DL (ref 0.43–1.29)
CRP SERPL-MCNC: 2.39 MG/DL (ref 0–0.99)
DEPRECATED RDW RBC AUTO: 44.8 FL (ref 37–54)
EOSINOPHIL # BLD AUTO: 0.03 10*3/MM3 (ref 0–0.7)
EOSINOPHIL NFR BLD AUTO: 0.3 % (ref 0–7)
ERYTHROCYTE [DISTWIDTH] IN BLOOD BY AUTOMATED COUNT: 13.7 % (ref 11.5–14.5)
GFR SERPL CREATININE-BSD FRML MDRD: 77 ML/MIN/1.73
GLOBULIN UR ELPH-MCNC: 2.8 GM/DL
GLUCOSE BLD-MCNC: 128 MG/DL (ref 70–110)
GLUCOSE BLDC GLUCOMTR-MCNC: 114 MG/DL (ref 70–130)
GLUCOSE BLDC GLUCOMTR-MCNC: 120 MG/DL (ref 70–130)
GLUCOSE BLDC GLUCOMTR-MCNC: 123 MG/DL (ref 70–130)
HCT VFR BLD AUTO: 39 % (ref 37–47)
HGB BLD-MCNC: 12.3 G/DL (ref 12–16)
IMM GRANULOCYTES # BLD: 0.02 10*3/MM3 (ref 0–0.03)
IMM GRANULOCYTES NFR BLD: 0.2 % (ref 0–0.5)
LYMPHOCYTES # BLD AUTO: 1.57 10*3/MM3 (ref 1–3)
LYMPHOCYTES NFR BLD AUTO: 17.3 % (ref 16–46)
MCH RBC QN AUTO: 29.6 PG (ref 27–33)
MCHC RBC AUTO-ENTMCNC: 31.5 G/DL (ref 33–37)
MCV RBC AUTO: 94 FL (ref 80–94)
MONOCYTES # BLD AUTO: 0.56 10*3/MM3 (ref 0.1–0.9)
MONOCYTES NFR BLD AUTO: 6.2 % (ref 0–12)
NEUTROPHILS # BLD AUTO: 6.89 10*3/MM3 (ref 1.4–6.5)
NEUTROPHILS NFR BLD AUTO: 75.9 % (ref 40–75)
OSMOLALITY SERPL CALC.SUM OF ELEC: 281.5 MOSM/KG (ref 273–305)
PLATELET # BLD AUTO: 157 10*3/MM3 (ref 130–400)
PMV BLD AUTO: 11.3 FL (ref 6–10)
POTASSIUM BLD-SCNC: 3.3 MMOL/L (ref 3.5–5.3)
PROT SERPL-MCNC: 6.2 G/DL (ref 6–8)
RBC # BLD AUTO: 4.15 10*6/MM3 (ref 4.2–5.4)
SODIUM BLD-SCNC: 140 MMOL/L (ref 135–153)
WBC NRBC COR # BLD: 9.08 10*3/MM3 (ref 4.5–12.5)

## 2017-10-19 PROCEDURE — 80053 COMPREHEN METABOLIC PANEL: CPT | Performed by: INTERNAL MEDICINE

## 2017-10-19 PROCEDURE — 94799 UNLISTED PULMONARY SVC/PX: CPT

## 2017-10-19 PROCEDURE — 86140 C-REACTIVE PROTEIN: CPT | Performed by: INTERNAL MEDICINE

## 2017-10-19 PROCEDURE — 82962 GLUCOSE BLOOD TEST: CPT

## 2017-10-19 PROCEDURE — 99233 SBSQ HOSP IP/OBS HIGH 50: CPT | Performed by: INTERNAL MEDICINE

## 2017-10-19 PROCEDURE — 25010000002 HEPARIN (PORCINE) PER 1000 UNITS: Performed by: INTERNAL MEDICINE

## 2017-10-19 PROCEDURE — 85025 COMPLETE CBC W/AUTO DIFF WBC: CPT | Performed by: INTERNAL MEDICINE

## 2017-10-19 RX ADMIN — MEMANTINE HYDROCHLORIDE 10 MG: 10 TABLET ORAL at 20:57

## 2017-10-19 RX ADMIN — ATORVASTATIN CALCIUM 40 MG: 40 TABLET, FILM COATED ORAL at 20:57

## 2017-10-19 RX ADMIN — Medication 1 CAPSULE: at 08:03

## 2017-10-19 RX ADMIN — HEPARIN SODIUM 5000 UNITS: 5000 INJECTION, SOLUTION INTRAVENOUS; SUBCUTANEOUS at 20:57

## 2017-10-19 RX ADMIN — HEPARIN SODIUM 5000 UNITS: 5000 INJECTION, SOLUTION INTRAVENOUS; SUBCUTANEOUS at 08:03

## 2017-10-19 RX ADMIN — LEVOTHYROXINE SODIUM 25 MCG: 25 TABLET ORAL at 20:57

## 2017-10-19 RX ADMIN — ESCITALOPRAM 10 MG: 10 TABLET, FILM COATED ORAL at 20:57

## 2017-10-19 RX ADMIN — SODIUM CHLORIDE 100 ML/HR: 9 INJECTION, SOLUTION INTRAVENOUS at 15:22

## 2017-10-19 RX ADMIN — ACETAMINOPHEN 650 MG: 325 TABLET ORAL at 04:26

## 2017-10-19 RX ADMIN — SODIUM CHLORIDE 100 ML/HR: 9 INJECTION, SOLUTION INTRAVENOUS at 04:26

## 2017-10-19 RX ADMIN — DONEPEZIL HYDROCHLORIDE 10 MG: 5 TABLET, FILM COATED ORAL at 20:57

## 2017-10-19 RX ADMIN — LEVOTHYROXINE SODIUM 25 MCG: 25 TABLET ORAL at 04:26

## 2017-10-19 RX ADMIN — MEMANTINE HYDROCHLORIDE 10 MG: 10 TABLET ORAL at 08:03

## 2017-10-19 NOTE — THERAPY EVALUATION
Acute Care - Occupational Therapy Initial Evaluation   [facility]     Patient Name: Luz Beasley  : 1940  MRN: [id number]  Today's Date: 10/19/2017  Onset of Illness/Injury or Date of Surgery Date: 10/17/17     Referring Physician: Dr. Heredia    Admit Date: 10/17/2017       ICD-10-CM ICD-9-CM   1. Acute cystitis without hematuria N30.00 595.0   2. 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 Physical debility R53.81 799.3     Patient Active Problem List   Diagnosis   • Diabetes mellitus*   • Hyperlipemia*   • Senile dementia*   • Vitamin D deficiency*   • Essential hypertension   • Obesity*   • Hyperparathyroidism status post parathyroidectomy*   • Arthritis   • Loss of balance   • Acquired hypothyroidism   • Anxiety   • Cluster headaches   • UTI (urinary tract infection)   • Sepsis     Past Medical History:   Diagnosis Date   • Dementia    • Depression    • Diabetes mellitus    • Hyperlipidemia    • Hypertension    • Obesity    • Senile dementia    • Vitamin D deficiency disease      Past Surgical History:   Procedure Laterality Date   • CARDIAC CATHETERIZATION  2006   • CARDIOVASCULAR STRESS TEST  2006   • COLONOSCOPY  2008   • HYSTERECTOMY     • THYROID SURGERY            OT ASSESSMENT FLOWSHEET (last 72 hours)      OT Evaluation       10/18/17 1630 10/18/17 1455 10/17/17 2213 10/17/17 2207       Rehab Evaluation    Document Type evaluation  -AG evaluation  -KR       Subjective Information agree to therapy;complains of;fatigue  -AG decreased LOC  -KR       Patient Effort, Rehab Treatment fair  -AG fair  -KR       Symptoms Noted During/After Treatment fatigue   pt. remained drowsy throughout encounter  -AG        General Information    Patient Profile Review yes  -AG        Onset of Illness/Injury or Date of Surgery Date 10/17/17  -AG        Referring Physician Dr. Heredia  -        General Observations pt. in R sidelying; difficult to awaken; decr arousal throughout  session.  Pt. c/o discomfort with movement of B LE  -AG        Pertinent History Of Current Problem pt. underwent parathyroidectomy; presents with dementia, physical deconditioning.  -        Precautions/Limitations fall precautions;oxygen therapy device and L/min  -AG        Prior Level of Function --   unable to ascertain from patient.  -        Equipment Currently Used at Home walker, standard  -AG   walker, standard  -     Plans/Goals Discussed With patient;agreed upon  -        Risks Reviewed patient:;dizziness;increased discomfort  -AG        Benefits Reviewed patient:;improve function;increase independence;increase strength;increase balance;increase knowledge  -        Living Environment    Lives With child(pedro), adult  -AG  child(pedro), adult  -      Living Arrangements mobile home  -  mobile home  -      Home Accessibility stairs (2 railings present)  -  stairs (2 railings present)  -      Stair Railings at Home outside, present at both sides  -  outside, present at both sides  -      Type of Financial/Environmental Concern   not enough insurance   applied for extra insurance today, will take 30 days [facility]   -      Transportation Available   ambulance  -      Functional Level Prior    Ambulation    3-->assistive equipment and person  -MC     Transferring    3-->assistive equipment and person  -MC     Toileting    3-->assistive equipment and person  -MC     Bathing    3-->assistive equipment and person  -     Dressing    2-->assistive person  -     Eating    2-->assistive person  -     Communication    0-->understands/communicates without difficulty  -     Swallowing    0-->swallows foods/liquids without difficulty  -     Pain Assessment    Pain Assessment Harvey-Bhakta FACES  -        Harvey-Bhakta FACES Pain Rating 2  -AG        Pain Location Leg  -AG        Pain Orientation Right;Left  -AG        Pain Frequency Intermittent   with movement of LE  -AG        Vision  Assessment/Intervention    Visual Impairment --   unable to formally assess  -AG        Cognitive Assessment/Intervention    Current Cognitive/Communication Assessment impaired   pt. communicated minimally with therapist  -AG impaired  -KR       Orientation Status oriented to;person  -AG disoriented x 4  -KR       Follows Commands/Answers Questions 25% of the time;able to follow single-step instructions;needs cueing;needs increased time;needs repetition  -AG 25% of the time  -KR       Personal Safety decreased awareness, need for assist;decreased awareness, need for safety  -AG decreased awareness, need for assist  -KR       Personal Safety Interventions fall prevention program maintained;gait belt;nonskid shoes/slippers when out of bed;supervised activity  -AG        ROM (Range of Motion)    General ROM --   PROM WFL B LE; pt. did not cooperate with exam  -AG --   BUE 3/5 AROM  -KR       MMT (Manual Muscle Testing)    General MMT Assessment --   difficult to formally assess; pt. did not cooperate w/ exam  -AG --   BUE 2/5  -KR       Muscle Tone Assessment    Muscle Tone Assessment --   functional tone  -AG        Mobility Assessment/Training    Extremity Weight-Bearing Status --   no weightbearing restrictions  -AG        Bed Mobility, Assessment/Treatment    Bed Mobility, Assistive Device bed rails;draw sheet  -AG        Bed Mobility, Roll Right, [place] verbal cues required;nonverbal cues required (demo/gesture);maximum assist (25% patient effort);2 person assist required  -AG        Bed Mobility, Scoot/Bridge, [place] maximum assist (25% patient effort);2 person assist required  -AG        Bed Mob, Supine to Sit, [place] verbal cues required;nonverbal cues required (demo/gesture);maximum assist (25% patient effort);2 person assist required  -AG        Bed Mob, Sit to Supine, [place] verbal cues required;nonverbal cues required (demo/gesture);maximum assist (25% patient effort);2 person assist  required  -AG        Bed Mobility, Safety Issues cognitive deficits limit understanding;decreased use of arms for pushing/pulling;decreased use of legs for bridging/pushing  -AG        Bed Mobility, Impairments strength decreased  -AG        Transfer Assessment/Treatment    Transfers, Bed-Chair [place] --   pt. remained drowsy  -AG        Transfer, Comment --   pt deemed unsafe to sit in chair without close supervision  -AG        Upper Body Bathing Assessment/Training    UB Bathing Assess/Train, [place] Level  dependent (less than 25% patient effort)  -KR       Lower Body Bathing Assessment/Training    LB Bathing Assess/Train, [place] Level  dependent (less than 25% patient effort)  -KR       Upper Body Dressing Assessment/Training    UB Dressing Assess/Train, [place]  dependent (less than 25% patient effort)  -KR       Lower Body Dressing Assessment/Training    LB Dressing Assess/Train, [place]  dependent (less than 25% patient effort)  -KR       Toileting Assessment/Training    Toileting Assess/Train, Indepen Level  dependent (less than 25% patient effort)  -KR       Grooming Assessment/Training    Grooming Assess/Train, Indepen Level  dependent (less than 25% patient effort)  -KR       Motor Skills/Interventions    Additional Documentation Balance Skills Training (Group)  -AG        Balance Skills Training    Sitting-Level of Assistance Minimum assistance;Moderate assistance  -AG        Sitting-Balance Support Feet supported  -AG        Sitting-Balance Activities --   trunk, cervical ext/ upright posture  -AG        Sitting # of Minutes 3-4 minutes  -AG        General Therapy Interventions    Planned Therapy Interventions  activity intolerance;ROM (Range of Motion);strengthening   cognitive assist/following directions  -KR       Positioning and Restraints    Pre-Treatment Position in bed  -AG        Post Treatment Position bed  -AG        In Bed supine;call light within reach;encouraged  to call for assist;exit alarm on;side rails up x3  -AG          User Key  (r) = Recorded By, (t) = Taken By, (c) = Cosigned By    Initials Name Effective Dates    AG Shahida Richardson, PT 03/14/16 -     KR Nate Davila OT 03/14/16 -     JOSE A Olea V RN 04/14/17 -                  OT Recommendation and Plan  Planned Therapy Interventions: activity intolerance, ROM (Range of Motion), strengthening (cognitive assist/following directions)             OT Goals       10/18/17 1504          Strength OT STG    Strength Goal OT STG, Date Established 10/18/17  -KR      Strength Goal OT STG, Time to Achieve by discharge  -KR      Strength Goal OT STG, Measure to Achieve BUE 4/5 to enhance self care/mobility  -KR      Range of Motion OT LTG    Range of Motion Goal OT LTG, Date Established 10/18/17  -KR      Range of Motion Goal OT LTG, Time to Achieve by discharge  -KR      Range of Motion Goal OT LTG, AROM Measure BUE 4/5 AROM to enhance self care  -KR      ADL OT LTG    ADL OT LTG, Date Established 10/18/17  -KR      ADL OT LTG, Time to Achieve by discharge  -KR      ADL OT LTG, Activity Type ADL skills  -KR      ADL OT LTG, [place] Level mod assist;mod verbal cues  -KR        User Key  (r) = Recorded By, (t) = Taken By, (c) = Cosigned By    Initials Name Provider Type    KR Nate Davila OT Occupational Therapist                Outcome Measures       10/19/17 0800 10/18/17 1600 10/18/17 1509    How much help from another person do you currently need...    Turning from your back to your side while in flat bed without using bedrails?  2  -AG     Moving from lying on back to sitting on the side of a flat bed without bedrails?  2  -AG     Moving to and from a bed to a chair (including a wheelchair)?  1  -AG     Standing up from a chair using your arms (e.g., wheelchair, bedside chair)?  1  -AG     Climbing 3-5 steps with a railing?  1  -AG     To walk in hospital room?  1  -AG     AM-PAC 6 Clicks Score  8  -AG     How  much help from another is currently needed...    Putting on and taking off regular lower body clothing?   2  -KR    Bathing (including washing, rinsing, and drying)   2  -KR    Toileting (which includes using toilet bed pan or urinal)   2  -KR    Putting on and taking off regular upper body clothing   2  -KR    Taking care of personal grooming (such as brushing teeth)   2  -KR    Eating meals   2  -KR    Score   12  -KR    Functional Assessment    Outcome Measure Options AM-PAC 6 Clicks Daily Activity (OT)  -KR AM-PAC 6 Clicks Basic Mobility (PT)  -AG       User Key  (r) = Recorded By, (t) = Taken By, (c) = Cosigned By    Initials Name Provider Type    AG Shahida Richardson, PT Physical Therapist    CHUNG Davila OT Occupational Therapist          Time Calculation:        Therapy Charges for Today     Code Description Service Date Service Provider Modifiers Qty    [id number]  OT SELFCARE CURRENT 10/18/2017 GIORGIO Wilson, CM 1    [id number]  OT SELFCARE PROJECTED 10/18/2017 GIORGIO Wilson, CL 1    [id number]  OT EVAL HIGH COMPLEXITY 2 10/18/2017 Nate Davila OT GO 1          OT G-codes  Functional Limitation: Self care  Self Care Current Status (): At least 80 percent but less than 100 percent impaired, limited or restricted  Self Care Goal Status (): At least 60 percent but less than 80 percent impaired, limited or restricted    Nate Davila OT  10/19/2017

## 2017-10-19 NOTE — PROGRESS NOTES
Subjective     History:   Luz Beasley is a 77 y.o. female admitted on 10/17/2017 secondary to UTI (urinary tract infection)     Procedures: None    Patient seen and examined with CHRISTINE Cox. Awake and alert but confused. Oriented to person and place today. Appears more alert compared to yesterday. Reports some dysuria today. Denies CP. Answers yes to most questions making obtaining an accurate history difficult. RN reports pt has not eaten much today. No acute events overnight per RN.     History taken from: chart, and RN.      Objective     Vital Signs  Temp:  [98 °F (36.7 °C)-99.3 °F (37.4 °C)] 98 °F (36.7 °C)  Heart Rate:  [61-72] 65  Resp:  [18-20] 20  BP: (150-161)/(60-77) 160/60    Intake/Output Summary (Last 24 hours) at 10/19/17 1717  Last data filed at 10/19/17 1500   Gross per 24 hour   Intake                0 ml   Output              200 ml   Net             -200 ml         Physical Exam:  General:    Awake, alert but confused (oriented to person and place), in no acute distress, able to follow simple commands    Heart:      Normal S1 and S2. Regular rate and rhythm. No significant murmur, rubs or gallops appreciated.   Lungs:     Respirations regular, even and unlabored. Lungs clear to auscultation B/L. No wheezes, rales or rhonchi.   Abdomen:   Soft and nontender. No guarding, rebound tenderness or  organomegaly noted. Bowel sounds present x 4.   Extremities:  No clubbing, cyanosis or edema noted. Moves UE and LE equally B/L.     Results Review:      Results from last 7 days  Lab Units 10/19/17  0047 10/18/17  0102 10/17/17  1818   WBC 10*3/mm3 9.08 7.85 12.03   HEMOGLOBIN g/dL 12.3 12.8 14.5   PLATELETS 10*3/mm3 157 158 188       Results from last 7 days  Lab Units 10/19/17  0047 10/18/17  0102 10/17/17  1818   SODIUM mmol/L 140 143 142   POTASSIUM mmol/L 3.3* 3.8 3.5   CHLORIDE mmol/L 112 113* 108   CO2 mmol/L 20.6* 23.8* 23.9*   BUN mg/dL 14 23* 28*   CREATININE mg/dL 0.73 0.94 1.21   CALCIUM mg/dL  8.8 9.2 10.5*   GLUCOSE mg/dL 128* 120* 153*       Results from last 7 days  Lab Units 10/19/17  0047 10/17/17  1818   BILIRUBIN mg/dL 1.4 1.6   ALK PHOS U/L 86 113*   AST (SGOT) U/L 30 30   ALT (SGPT) U/L 17 23               Results from last 7 days  Lab Units 10/17/17  1818   TROPONIN I ng/mL 0.040       Imaging Results (last 24 hours)     ** No results found for the last 24 hours. **            Medications:    atorvastatin 40 mg Oral Nightly   ceftriaxone 1 g Intravenous Q24H   donepezil 10 mg Oral Nightly   escitalopram 10 mg Oral Nightly   heparin (porcine) 5,000 Units Subcutaneous Q12H   insulin aspart 0-7 Units Subcutaneous 4x Daily AC & at Bedtime   levothyroxine 25 mcg Oral Nightly   memantine 10 mg Oral Q12H   Risaquad-2 1 capsule Oral Daily   Vortioxetine HBr 10 mg Oral Nightly       sodium chloride 100 mL/hr Last Rate: 100 mL/hr (10/19/17 1522)           Assessment/Plan   Severe sepsis: Pt met sepsis criteria upon admission with HR>90 and elevated CRP. Likely 2/2 UTI. Evidence of end organ dysfunction with worsening confusion superimposed on her baseline dementia. Currently afebrile and hemodynamically stable. Lactate normal upon admission. WBC remains stable. CRP stable today. Currently on Rocephin. Cont IVF's, follow cultures and repeat labs in the AM.     GNR UTI: Urine culture revealing >100,000 GNR. Cont Rocephin as outlined above.     Metabolic encephalopathy superimposed on baseline dementia: CT head negative for any acute changes. TSH is normal. Cont treatment of sepsis as outlined above. Cont home medication regimen and supportive treatment.     Dehydration: Cont IVF's. Repeat labs in the AM.     Mild hypercalcemia: Likely 2/2 dehydration. Now resolved. Cont IVF's and repeat labs in the AM.     Generalized weakness/debility: Cont treatment as outlined above. PT/OT consulted. Pt's family interested in rehab placement and SS has worked to arrange placement following pt's qualifying hospital stay.      Hypothyroidism: TSH is normal. Cont home Synthroid.     DM II, non-insulin dependent: HgbA1c is 5.8. Holding home metformin. Cont SSI with Accuchecks.     DVT PPX: SQ heparin    Pt is at high risk 2/2 severe sepsis, UTI, metabolic encephalopathy superimposed on baseline dementia, dehydration and debility.         Yusef Rivera, DO  10/19/17  5:17 PM

## 2017-10-19 NOTE — SIGNIFICANT NOTE
10/19/17 1511   Rehab Treatment   Discipline physical therapy assistant   Treatment Not Performed patient/family declined treatment  (Pt refused treatment this session. Patient was assisted to side of bed, but immediately layed back down. Patient demonstrates decreased cognition and is unable to participate. )   Recommendation   PT - Next Appointment 10/20/17

## 2017-10-19 NOTE — PLAN OF CARE
Problem: Inpatient Occupational Therapy  Goal: Strength Goal STG- OT    10/18/17 1504   Strength OT STG   Strength Goal OT STG, Date Established 10/18/17   Strength Goal OT STG, Time to Achieve by discharge   Strength Goal OT STG, Measure to Achieve BUE 4/5 to enhance self care/mobility       Goal: Range of Motion Goal LTG- OT    10/18/17 1504   Range of Motion OT LTG   Range of Motion Goal OT LTG, Date Established 10/18/17   Range of Motion Goal OT LTG, Time to Achieve by discharge   Range of Motion Goal OT LTG, AROM Measure BUE 4/5 AROM to enhance self care       Goal: ADL Goal LTG- OT    10/18/17 1504   ADL OT LTG   ADL OT LTG, Date Established 10/18/17   ADL OT LTG, Time to Achieve by discharge   ADL OT LTG, Activity Type ADL skills   ADL OT LTG, [place] mod assist;mod verbal cues

## 2017-10-20 LAB
ANION GAP SERPL CALCULATED.3IONS-SCNC: 4.6 MMOL/L (ref 3.6–11.2)
BASOPHILS # BLD AUTO: 0.01 10*3/MM3 (ref 0–0.3)
BASOPHILS NFR BLD AUTO: 0.1 % (ref 0–2)
BUN BLD-MCNC: 14 MG/DL (ref 7–21)
BUN/CREAT SERPL: 18.7 (ref 7–25)
CALCIUM SPEC-SCNC: 8.9 MG/DL (ref 7.7–10)
CHLORIDE SERPL-SCNC: 114 MMOL/L (ref 99–112)
CO2 SERPL-SCNC: 23.4 MMOL/L (ref 24.3–31.9)
CREAT BLD-MCNC: 0.75 MG/DL (ref 0.43–1.29)
CRP SERPL-MCNC: 2.52 MG/DL (ref 0–0.99)
DEPRECATED RDW RBC AUTO: 44.1 FL (ref 37–54)
EOSINOPHIL # BLD AUTO: 0.07 10*3/MM3 (ref 0–0.7)
EOSINOPHIL NFR BLD AUTO: 0.7 % (ref 0–7)
ERYTHROCYTE [DISTWIDTH] IN BLOOD BY AUTOMATED COUNT: 13.5 % (ref 11.5–14.5)
GFR SERPL CREATININE-BSD FRML MDRD: 75 ML/MIN/1.73
GLUCOSE BLD-MCNC: 140 MG/DL (ref 70–110)
HCT VFR BLD AUTO: 37.5 % (ref 37–47)
HGB BLD-MCNC: 12.1 G/DL (ref 12–16)
IMM GRANULOCYTES # BLD: 0.03 10*3/MM3 (ref 0–0.03)
IMM GRANULOCYTES NFR BLD: 0.3 % (ref 0–0.5)
LYMPHOCYTES # BLD AUTO: 1.97 10*3/MM3 (ref 1–3)
LYMPHOCYTES NFR BLD AUTO: 20.8 % (ref 16–46)
MAGNESIUM SERPL-MCNC: 1.7 MG/DL (ref 1.7–2.6)
MCH RBC QN AUTO: 30.2 PG (ref 27–33)
MCHC RBC AUTO-ENTMCNC: 32.3 G/DL (ref 33–37)
MCV RBC AUTO: 93.5 FL (ref 80–94)
MONOCYTES # BLD AUTO: 0.65 10*3/MM3 (ref 0.1–0.9)
MONOCYTES NFR BLD AUTO: 6.9 % (ref 0–12)
NEUTROPHILS # BLD AUTO: 6.75 10*3/MM3 (ref 1.4–6.5)
NEUTROPHILS NFR BLD AUTO: 71.2 % (ref 40–75)
OSMOLALITY SERPL CALC.SUM OF ELEC: 285.9 MOSM/KG (ref 273–305)
PLATELET # BLD AUTO: 167 10*3/MM3 (ref 130–400)
PMV BLD AUTO: 11.7 FL (ref 6–10)
POTASSIUM BLD-SCNC: 3.2 MMOL/L (ref 3.5–5.3)
POTASSIUM BLD-SCNC: 3.2 MMOL/L (ref 3.5–5.3)
RBC # BLD AUTO: 4.01 10*6/MM3 (ref 4.2–5.4)
SODIUM BLD-SCNC: 142 MMOL/L (ref 135–153)
WBC NRBC COR # BLD: 9.48 10*3/MM3 (ref 4.5–12.5)

## 2017-10-20 PROCEDURE — 25010000002 CEFTRIAXONE: Performed by: INTERNAL MEDICINE

## 2017-10-20 PROCEDURE — 83735 ASSAY OF MAGNESIUM: CPT | Performed by: PHYSICIAN ASSISTANT

## 2017-10-20 PROCEDURE — 99233 SBSQ HOSP IP/OBS HIGH 50: CPT | Performed by: INTERNAL MEDICINE

## 2017-10-20 PROCEDURE — 85025 COMPLETE CBC W/AUTO DIFF WBC: CPT | Performed by: INTERNAL MEDICINE

## 2017-10-20 PROCEDURE — 82962 GLUCOSE BLOOD TEST: CPT

## 2017-10-20 PROCEDURE — 25010000002 HEPARIN (PORCINE) PER 1000 UNITS: Performed by: INTERNAL MEDICINE

## 2017-10-20 PROCEDURE — 84132 ASSAY OF SERUM POTASSIUM: CPT | Performed by: INTERNAL MEDICINE

## 2017-10-20 PROCEDURE — 80048 BASIC METABOLIC PNL TOTAL CA: CPT | Performed by: INTERNAL MEDICINE

## 2017-10-20 PROCEDURE — G0008 ADMIN INFLUENZA VIRUS VAC: HCPCS | Performed by: INTERNAL MEDICINE

## 2017-10-20 PROCEDURE — 90674 CCIIV4 VAC NO PRSV 0.5 ML IM: CPT | Performed by: INTERNAL MEDICINE

## 2017-10-20 PROCEDURE — 25010000002 INFLUENZA VAC SPLIT QUAD 0.5 ML SUSPENSION PREFILLED SYRINGE: Performed by: INTERNAL MEDICINE

## 2017-10-20 PROCEDURE — 97110 THERAPEUTIC EXERCISES: CPT

## 2017-10-20 PROCEDURE — 63710000001 INSULIN ASPART PER 5 UNITS: Performed by: INTERNAL MEDICINE

## 2017-10-20 PROCEDURE — 94799 UNLISTED PULMONARY SVC/PX: CPT

## 2017-10-20 PROCEDURE — 86140 C-REACTIVE PROTEIN: CPT | Performed by: INTERNAL MEDICINE

## 2017-10-20 RX ORDER — MAGNESIUM SULFATE HEPTAHYDRATE 40 MG/ML
2 INJECTION, SOLUTION INTRAVENOUS AS NEEDED
Status: DISCONTINUED | OUTPATIENT
Start: 2017-10-20 | End: 2017-10-23 | Stop reason: HOSPADM

## 2017-10-20 RX ORDER — MAGNESIUM SULFATE HEPTAHYDRATE 40 MG/ML
4 INJECTION, SOLUTION INTRAVENOUS ONCE
Status: COMPLETED | OUTPATIENT
Start: 2017-10-20 | End: 2017-10-20

## 2017-10-20 RX ORDER — MEGESTROL ACETATE 40 MG/ML
400 SUSPENSION ORAL 2 TIMES DAILY
Status: DISCONTINUED | OUTPATIENT
Start: 2017-10-20 | End: 2017-10-23 | Stop reason: HOSPADM

## 2017-10-20 RX ORDER — POTASSIUM CHLORIDE 7.45 MG/ML
10 INJECTION INTRAVENOUS
Status: DISCONTINUED | OUTPATIENT
Start: 2017-10-20 | End: 2017-10-23 | Stop reason: HOSPADM

## 2017-10-20 RX ORDER — MAGNESIUM SULFATE HEPTAHYDRATE 40 MG/ML
4 INJECTION, SOLUTION INTRAVENOUS AS NEEDED
Status: DISCONTINUED | OUTPATIENT
Start: 2017-10-20 | End: 2017-10-23 | Stop reason: HOSPADM

## 2017-10-20 RX ORDER — POTASSIUM CHLORIDE 750 MG/1
40 CAPSULE, EXTENDED RELEASE ORAL AS NEEDED
Status: DISCONTINUED | OUTPATIENT
Start: 2017-10-20 | End: 2017-10-23 | Stop reason: HOSPADM

## 2017-10-20 RX ORDER — POTASSIUM CHLORIDE 20 MEQ/1
40 TABLET, EXTENDED RELEASE ORAL EVERY 4 HOURS
Status: DISPENSED | OUTPATIENT
Start: 2017-10-20 | End: 2017-10-20

## 2017-10-20 RX ORDER — POTASSIUM CHLORIDE 1.5 G/1.77G
40 POWDER, FOR SOLUTION ORAL AS NEEDED
Status: DISCONTINUED | OUTPATIENT
Start: 2017-10-20 | End: 2017-10-23 | Stop reason: HOSPADM

## 2017-10-20 RX ADMIN — HEPARIN SODIUM 5000 UNITS: 5000 INJECTION, SOLUTION INTRAVENOUS; SUBCUTANEOUS at 08:34

## 2017-10-20 RX ADMIN — MEMANTINE HYDROCHLORIDE 10 MG: 10 TABLET ORAL at 08:34

## 2017-10-20 RX ADMIN — DONEPEZIL HYDROCHLORIDE 10 MG: 5 TABLET, FILM COATED ORAL at 21:04

## 2017-10-20 RX ADMIN — LEVOTHYROXINE SODIUM 25 MCG: 25 TABLET ORAL at 21:05

## 2017-10-20 RX ADMIN — CEFTRIAXONE 1 G: 1 INJECTION, POWDER, FOR SOLUTION INTRAMUSCULAR; INTRAVENOUS at 21:04

## 2017-10-20 RX ADMIN — ACETAMINOPHEN 650 MG: 325 TABLET ORAL at 08:34

## 2017-10-20 RX ADMIN — ACETAMINOPHEN 650 MG: 325 TABLET ORAL at 17:24

## 2017-10-20 RX ADMIN — CLONAZEPAM 0.5 MG: 0.5 TABLET ORAL at 21:08

## 2017-10-20 RX ADMIN — SODIUM CHLORIDE 60 ML/HR: 9 INJECTION, SOLUTION INTRAVENOUS at 14:26

## 2017-10-20 RX ADMIN — Medication 10 ML: at 21:05

## 2017-10-20 RX ADMIN — MEMANTINE HYDROCHLORIDE 10 MG: 10 TABLET ORAL at 21:05

## 2017-10-20 RX ADMIN — POTASSIUM CHLORIDE 40 MEQ: 1500 TABLET, EXTENDED RELEASE ORAL at 15:26

## 2017-10-20 RX ADMIN — SODIUM CHLORIDE 100 ML/HR: 9 INJECTION, SOLUTION INTRAVENOUS at 01:49

## 2017-10-20 RX ADMIN — INSULIN ASPART 2 UNITS: 100 INJECTION, SOLUTION INTRAVENOUS; SUBCUTANEOUS at 21:08

## 2017-10-20 RX ADMIN — HEPARIN SODIUM 5000 UNITS: 5000 INJECTION, SOLUTION INTRAVENOUS; SUBCUTANEOUS at 21:04

## 2017-10-20 RX ADMIN — INFLUENZA VIRUS VACCINE 0.5 ML: 15; 15; 15; 15 SUSPENSION INTRAMUSCULAR at 17:52

## 2017-10-20 RX ADMIN — ESCITALOPRAM 10 MG: 10 TABLET, FILM COATED ORAL at 21:04

## 2017-10-20 RX ADMIN — Medication 1 CAPSULE: at 08:34

## 2017-10-20 RX ADMIN — MEGESTROL ACETATE 400 MG: 40 SUSPENSION ORAL at 17:21

## 2017-10-20 RX ADMIN — CLONAZEPAM 0.5 MG: 0.5 TABLET ORAL at 08:35

## 2017-10-20 RX ADMIN — MAGNESIUM SULFATE HEPTAHYDRATE 4 G: 40 INJECTION, SOLUTION INTRAVENOUS at 15:26

## 2017-10-20 RX ADMIN — ATORVASTATIN CALCIUM 40 MG: 40 TABLET, FILM COATED ORAL at 21:05

## 2017-10-20 NOTE — PLAN OF CARE
Problem: Patient Care Overview (Adult)  Goal: Plan of Care Review  Outcome: Ongoing (interventions implemented as appropriate)  Goal: Adult Individualization and Mutuality  Outcome: Ongoing (interventions implemented as appropriate)  Goal: Discharge Needs Assessment  Outcome: Ongoing (interventions implemented as appropriate)    Problem: Pressure Ulcer Risk (Calin Scale) (Adult,Obstetrics,Pediatric)  Goal: Identify Related Risk Factors and Signs and Symptoms  Outcome: Ongoing (interventions implemented as appropriate)  Goal: Skin Integrity  Outcome: Ongoing (interventions implemented as appropriate)

## 2017-10-20 NOTE — PROGRESS NOTES
Discharge Planning Assessment  [facility]     Patient Name: Luz Beasley  MRN: [id number]  Today's Date: 10/20/2017    Admit Date: 10/17/2017    Discharge Plan       10/20/17 1354    Case Management/Social Work Plan    Plan SS contacted [facility] per Suad who states they are still able to accept pt tomorrow, 10/21/17. Suad states they will need orders by 11:00 AM tomorrow. SS made Dr. Rivera aware. SS will continue to follow and assist as needed with discharge planning.     Patient/Family In Agreement With Plan yes     Patt Salgado

## 2017-10-20 NOTE — THERAPY TREATMENT NOTE
Acute Care - Physical Therapy Treatment Note   Noel     Patient Name: Luz Beasley  : 1940  MRN: [id number]  Today's Date: 10/20/2017  Onset of Illness/Injury or Date of Surgery Date: 10/17/17  Date of Referral to PT: 10/17/17  Referring Physician: Dr. Heredia    Admit Date: 10/17/2017    Visit Dx:    ICD-10-CM ICD-9-CM   1. Acute cystitis without hematuria N30.00 595.0   2. 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 Physical debility R53.81 799.3     Patient Active Problem List   Diagnosis   • Diabetes mellitus*   • Hyperlipemia*   • Senile dementia*   • Vitamin D deficiency*   • Essential hypertension   • Obesity*   • Hyperparathyroidism status post parathyroidectomy*   • Arthritis   • Loss of balance   • Acquired hypothyroidism   • Anxiety   • Cluster headaches   • UTI (urinary tract infection)   • Sepsis               Adult Rehabilitation Note       10/20/17 1400          Rehab Assessment/Intervention    Discipline physical therapy assistant  -RF      Document Type therapy note (daily note)  -RF      Subjective Information decreased LOC  -RF      Patient Effort, Rehab Treatment poor  -RF      Symptoms Noted During/After Treatment fatigue   Pt remained unwilling to participate and drowsy throughout  -RF      Precautions/Limitations fall precautions;oxygen therapy device and L/min  -RF      Patient Response to Treatment Pt agreed to sit EOB, but demonstrated dependent sitting balance throughout. Pt demanded to return to supine after approx 5 mins of sitting.   -RF      Recorded by [RF] Patt Sandoval PTA      Pain Assessment    Pain Assessment Harvey-Bhakta FACES  -RF      Harvey-Bhakta FACES Pain Rating 4  -RF      Pain Location Generalized  -RF      Recorded by [RF] Patt Sandoval PTA      Cognitive Assessment/Intervention    Current Cognitive/Communication Assessment impaired  -RF      Orientation Status oriented to;person  -RF      Follows Commands/Answers Questions able to follow  single-step instructions;25% of the time;needs increased time;needs repetition;needs cueing  -RF      Personal Safety decreased awareness, need for assist;decreased awareness, need for safety  -RF      Personal Safety Interventions fall prevention program maintained;muscle strengthening facilitated;gait belt;nonskid shoes/slippers when out of bed  -RF      Recorded by [RF] Patt Sandoval PTA      Bed Mobility, Assessment/Treatment    Bed Mobility, Assistive Device bed rails;draw sheet  -RF      Bed Mobility, Roll Right, [place] verbal cues required;nonverbal cues required (demo/gesture);maximum assist (25% patient effort);2 person assist required  -RF      Bed Mobility, Scoot/Bridge, [place] maximum assist (25% patient effort);2 person assist required  -RF      Bed Mob, Supine to Sit, [place] verbal cues required;nonverbal cues required (demo/gesture);maximum assist (25% patient effort);2 person assist required  -RF      Bed Mob, Sit to Supine, [place] verbal cues required;nonverbal cues required (demo/gesture);maximum assist (25% patient effort);2 person assist required  -RF      Bed Mobility, Safety Issues cognitive deficits limit understanding;decreased use of arms for pushing/pulling;decreased use of legs for bridging/pushing  -RF      Bed Mobility, Impairments strength decreased  -RF      Recorded by [RF] Patt Sandoval PTA      Transfer Assessment/Treatment    Transfers, Bed-Chair [place] unable to perform  -RF      Recorded by [RF] Patt Sandoval PTA      Gait Assessment/Treatment    Gait, [place] Level unable to perform  -RF      Recorded by [RF] Patt Sandoval PTA      Therapy Exercises    Bilateral Lower Extremities PROM:;10 reps;sitting  -RF      Recorded by [RF] Patt Sandoval PTA      Positioning and Restraints    Pre-Treatment Position in bed  -RF      Post Treatment Position bed  -RF      In Bed supine;call light within reach;encouraged to call for  assist;exit alarm on  -RF      Recorded by [RF] Patt Sandoval PTA        User Key  (r) = Recorded By, (t) = Taken By, (c) = Cosigned By    Initials Name Effective Dates    RF Patt Sandoval PTA 07/19/17 -                 IP PT Goals       10/18/17 1647          Bed Mobility PT LTG    Bed Mobility PT LTG, Date Established 10/18/17  -AG      Bed Mobility PT LTG, Time to Achieve by discharge  -AG      Bed Mobility PT LTG, Activity Type supine to sit/sit to supine;roll left/roll right  -AG      Bed Mobility PT LTG, [place] Level moderate assist (50% patient effort)  -AG      Bed Mobility PT Goal  LTG, Assist Device bed rails  -AG      Transfer Training PT LTG    Transfer Training PT LTG, Date Established 10/18/17  -AG      Transfer Training PT LTG, Time to Achieve by discharge  -AG      Transfer Training PT LTG, Activity Type bed to chair /chair to bed;sit to stand/stand to sit  -AG      Transfer Training PT LTG, [place] Level moderate assist (50% patient effort)  -AG      Transfer Training PT LTG, Assist Device walker, rolling;other (see comments)   or appropriate a.d.  -AG      Gait Training PT LTG    Gait Training Goal PT LTG, Date Established 10/18/17  -AG      Gait Training Goal PT LTG, Time to Achieve by discharge  -AG      Gait Training Goal PT LTG, [place] Level moderate assist (50% patient effort);2 person assist required  -AG      Gait Training Goal PT LTG, Assist Device walker, rolling;other (see comments)   or appropriate a.d./ HHA   -AG      Gait Training Goal PT LTG, Distance to Achieve 10  -AG        User Key  (r) = Recorded By, (t) = Taken By, (c) = Cosigned By    Initials Name Provider Type    AG Shahida Richardson PT Physical Therapist          Physical Therapy Education     Title: PT OT SLP Therapies (Done)     Topic: Physical Therapy (Done)     Point: Mobility training (Done)    Learning Progress Summary    Learner Readiness Method Response Comment Documented by Status   Patient  Acceptance E VU,NR   10/20/17 1431 Done    Acceptance E VU,NR   10/19/17 2124 Done    Acceptance E VU,NR   10/18/17 2103 Done    Acceptance E,D NR,NL   10/18/17 1646 Active               Point: Home exercise program (Done)    Learning Progress Summary    Learner Readiness Method Response Comment Documented by Status   Patient Acceptance E VU,NR   10/20/17 1431 Done    Acceptance E VU,NR   10/19/17 2124 Done    Acceptance E VU,NR   10/18/17 2103 Done    Acceptance E,D NR,NL   10/18/17 1646 Active               Point: Body mechanics (Done)    Learning Progress Summary    Learner Readiness Method Response Comment Documented by Status   Patient Acceptance E VU,NR   10/20/17 1431 Done    Acceptance E VU,NR   10/19/17 2124 Done    Acceptance E VU,NR   10/18/17 2103 Done    Acceptance E,D NR,NL   10/18/17 1646 Active               Point: Precautions (Done)    Learning Progress Summary    Learner Readiness Method Response Comment Documented by Status   Patient Acceptance E VU,NR   10/20/17 1431 Done    Acceptance E VU,NR   10/19/17 2124 Done    Acceptance E VU,NR   10/18/17 2103 Done    Acceptance E,D NR,NL   10/18/17 1646 Active                      User Key     Initials Effective Dates Name Provider Type Discipline     03/14/16 -  Shahida Richardson, PT Physical Therapist PT     04/14/17 -  Pramod Lomeli, RN Registered Nurse Nurse     07/19/17 -  Patt Sandoval, PTA Physical Therapy Assistant PT                    PT Recommendation and Plan  Anticipated Equipment Needs At Discharge:  (TBD)  Anticipated Discharge Disposition: home with assist, home with home health, extended care facility  Planned Therapy Interventions: balance training, bed mobility training, gait training, home exercise program, neuromuscular re-education, patient/family education, ROM (Range of Motion), strengthening, transfer training  PT Frequency: 3-5 times/wk, per priority policy             Outcome Measures        10/20/17 1400 10/19/17 0800 10/18/17 1600    How much help from another person do you currently need...    Turning from your back to your side while in flat bed without using bedrails? 2  -RF  2  -AG    Moving from lying on back to sitting on the side of a flat bed without bedrails? 2  -RF  2  -AG    Moving to and from a bed to a chair (including a wheelchair)? 1  -RF  1  -AG    Standing up from a chair using your arms (e.g., wheelchair, bedside chair)? 1  -RF  1  -AG    Climbing 3-5 steps with a railing? 1  -RF  1  -AG    To walk in hospital room? 1  -RF  1  -AG    AM-PAC 6 Clicks Score 8  -RF  8  -AG    Functional Assessment    Outcome Measure Options AM-PAC 6 Clicks Basic Mobility (PT)  -RF AM-PAC 6 Clicks Daily Activity (OT)  -KR AM-PAC 6 Clicks Basic Mobility (PT)  -AG      10/18/17 1509          How much help from another is currently needed...    Putting on and taking off regular lower body clothing? 2  -KR      Bathing (including washing, rinsing, and drying) 2  -KR      Toileting (which includes using toilet bed pan or urinal) 2  -KR      Putting on and taking off regular upper body clothing 2  -KR      Taking care of personal grooming (such as brushing teeth) 2  -KR      Eating meals 2  -KR      Score 12  -KR        User Key  (r) = Recorded By, (t) = Taken By, (c) = Cosigned By    Initials Name Provider Type    AG Shahida Richardson, PT Physical Therapist    KR Nate Davila, OT Occupational Therapist    RF Patt Sandoval, [facility] Physical Therapy Assistant           Time Calculation:         PT Charges       10/20/17 1432          Time Calculation    PT Received On 10/20/17  -RF      PT - Next Appointment 10/23/17  -      PT Goal Re-Cert Due Date 11/01/17  -RF      Time Calculation- PT    Total Timed Code Minutes- PT 18 minute(s)  -RF        User Key  (r) = Recorded By, (t) = Taken By, (c) = Cosigned By    Initials Name Provider Type    RF Patt Sandoval, [facility] Physical Therapy Assistant          Therapy  Charges for Today     Code Description Service Date Service Provider Modifiers Qty    49834036432 HC PT THER PROC EA 15 MIN 10/20/2017 Patt Sandoval PTA GP 1    35049082066 HC PT THER SUPP EA 15 MIN 10/20/2017 Patt Sandoval PTA GP 1          PT G-Codes  Outcome Measure Options: AM-PAC 6 Clicks Basic Mobility (PT)  Score: 8  Functional Limitation: Mobility: Walking and moving around  Mobility: Walking and Moving Around Current Status (): At least 80 percent but less than 100 percent impaired, limited or restricted  Mobility: Walking and Moving Around Discharge Status (): At least 40 percent but less than 60 percent impaired, limited or restricted    Patt Sandoval PTA  10/20/2017

## 2017-10-20 NOTE — PLAN OF CARE
Problem: Patient Care Overview (Adult)  Goal: Plan of Care Review  Outcome: Ongoing (interventions implemented as appropriate)    10/19/17 0800 10/19/17 3981   Patient Care Overview   Progress --  progress toward functional goals as expected   Coping/Psychosocial Response Interventions   Plan Of Care Reviewed With patient --        Goal: Adult Individualization and Mutuality  Outcome: Ongoing (interventions implemented as appropriate)    Problem: Fall Risk (Adult)  Goal: Identify Related Risk Factors and Signs and Symptoms  Outcome: Ongoing (interventions implemented as appropriate)  Goal: Absence of Falls  Outcome: Ongoing (interventions implemented as appropriate)    Problem: Infection, Risk/Actual (Adult)  Goal: Infection Prevention/Resolution  Outcome: Ongoing (interventions implemented as appropriate)    Problem: Pressure Ulcer Risk (Calin Scale) (Adult,Obstetrics,Pediatric)  Goal: Identify Related Risk Factors and Signs and Symptoms  Outcome: Ongoing (interventions implemented as appropriate)  Goal: Skin Integrity  Outcome: Ongoing (interventions implemented as appropriate)

## 2017-10-20 NOTE — PROGRESS NOTES
Subjective     History:   Luz Beasley is a 77 y.o. female admitted on 10/17/2017 secondary to UTI (urinary tract infection)     Procedures: None    Patient seen and examined with CHRISTINE Eckert. Awake and alert but confused. Oriented to person and knows she's in a hospital. Appears more drowsy today and RN reports pt received Klonopin this AM. Pt reports pain in her sacrum but denies any other complaints. Appetite and PO intake remain poor. No acute events overnight per RN.     History taken from: chart, and RN.      Objective     Vital Signs  Temp:  [98 °F (36.7 °C)-99.5 °F (37.5 °C)] 98.1 °F (36.7 °C)  Heart Rate:  [56-70] 69  Resp:  [18-20] 18  BP: (154-166)/(60-75) 154/75    Intake/Output Summary (Last 24 hours) at 10/20/17 1245  Last data filed at 10/20/17 0900   Gross per 24 hour   Intake              240 ml   Output              200 ml   Net               40 ml         Physical Exam:  General:    Awake, alert but confused (oriented to person), in no acute distress, able to follow simple commands    Heart:      Normal S1 and S2. Regular rate and rhythm. No significant murmur, rubs or gallops appreciated.   Lungs:     Respirations regular, even and unlabored. Lungs clear to auscultation B/L. No wheezes, rales or rhonchi.   Abdomen:   Soft and nontender. No guarding, rebound tenderness or  organomegaly noted. Bowel sounds present x 4.   Extremities:  No clubbing, cyanosis or edema noted. Moves UE and LE equally B/L.     Results Review:      Results from last 7 days  Lab Units 10/20/17  0055 10/19/17  0047 10/18/17  0102 10/17/17  1818   WBC 10*3/mm3 9.48 9.08 7.85 12.03   HEMOGLOBIN g/dL 12.1 12.3 12.8 14.5   PLATELETS 10*3/mm3 167 157 158 188       Results from last 7 days  Lab Units 10/20/17  0055 10/19/17  0047 10/18/17  0102 10/17/17  1818   SODIUM mmol/L 142 140 143 142   POTASSIUM mmol/L 3.2* 3.3* 3.8 3.5   CHLORIDE mmol/L 114* 112 113* 108   CO2 mmol/L 23.4* 20.6* 23.8* 23.9*   BUN mg/dL 14 14 23* 28*    CREATININE mg/dL 0.75 0.73 0.94 1.21   CALCIUM mg/dL 8.9 8.8 9.2 10.5*   GLUCOSE mg/dL 140* 128* 120* 153*       Results from last 7 days  Lab Units 10/19/17  0047 10/17/17  1818   BILIRUBIN mg/dL 1.4 1.6   ALK PHOS U/L 86 113*   AST (SGOT) U/L 30 30   ALT (SGPT) U/L 17 23       Results from last 7 days  Lab Units 10/20/17  0055   MAGNESIUM mg/dL 1.7           Results from last 7 days  Lab Units 10/17/17  1818   TROPONIN I ng/mL 0.040       Imaging Results (last 24 hours)     ** No results found for the last 24 hours. **            Medications:    atorvastatin 40 mg Oral Nightly   ceftriaxone 1 g Intravenous Q24H   donepezil 10 mg Oral Nightly   escitalopram 10 mg Oral Nightly   heparin (porcine) 5,000 Units Subcutaneous Q12H   insulin aspart 0-7 Units Subcutaneous 4x Daily AC & at Bedtime   levothyroxine 25 mcg Oral Nightly   magnesium sulfate 4 g Intravenous Once   memantine 10 mg Oral Q12H   potassium chloride 40 mEq Oral Q4H   Risaquad-2 1 capsule Oral Daily   Vortioxetine HBr 10 mg Oral Nightly       sodium chloride 100 mL/hr Last Rate: 100 mL/hr (10/20/17 0149)           Assessment/Plan   Severe sepsis: Pt met sepsis criteria upon admission with HR>90 and elevated CRP. Likely 2/2 UTI. Evidence of end organ dysfunction with worsening confusion superimposed on her baseline dementia. Currently afebrile and hemodynamically stable. Lactate normal upon admission. WBC remains stable. CRP remains slightly elevated. Currently on Rocephin. Cont IVF's but decrease rate, follow cultures and repeat labs in the AM.     GNR UTI: Urine culture revealing >100,000 GNR. Cont Rocephin as outlined above.     Metabolic encephalopathy superimposed on baseline dementia: CT head negative for any acute changes. TSH is normal. Cont treatment of sepsis as outlined above. Cont home medication regimen and supportive treatment.     Dehydration: Cont IVF's but decrease rate. Will add an appetite stimulant in the setting of poor PO intake.  Repeat labs in the AM.     Mild hypercalcemia: Likely 2/2 dehydration. Now resolved. Cont IVF's and repeat labs in the AM.     Hypokalemia: Mg is <2. Cont electrolyte replacement protocols and repeat labs in the AM.     Generalized weakness/debility: Cont treatment as outlined above. PT/OT consulted. Pt's family interested in rehab placement and SS has worked to arrange placement following pt's qualifying hospital stay.     Hypothyroidism: TSH is normal. Cont home Synthroid.     DM II, non-insulin dependent: HgbA1c is 5.8. Holding home metformin. Cont SSI with Accuchecks.     DVT PPX: SQ heparin    Pt is at high risk 2/2 severe sepsis, UTI, metabolic encephalopathy superimposed on baseline dementia, dehydration and debility.         Yusef Rivera,   10/20/17  12:45 PM

## 2017-10-20 NOTE — PROGRESS NOTES
Antimicrobial Length of Therapy:    Day 4 Ben    Thank you.  Delilah Ling, Pharm.D.  10/20/2017  1:47 PM

## 2017-10-21 LAB
ANION GAP SERPL CALCULATED.3IONS-SCNC: 8.6 MMOL/L (ref 3.6–11.2)
BACTERIA SPEC AEROBE CULT: ABNORMAL
BACTERIA SPEC AEROBE CULT: ABNORMAL
BASOPHILS # BLD AUTO: 0.03 10*3/MM3 (ref 0–0.3)
BASOPHILS NFR BLD AUTO: 0.3 % (ref 0–2)
BUN BLD-MCNC: 13 MG/DL (ref 7–21)
BUN/CREAT SERPL: 17.1 (ref 7–25)
CALCIUM SPEC-SCNC: 8.3 MG/DL (ref 7.7–10)
CHLORIDE SERPL-SCNC: 113 MMOL/L (ref 99–112)
CO2 SERPL-SCNC: 22.4 MMOL/L (ref 24.3–31.9)
CREAT BLD-MCNC: 0.76 MG/DL (ref 0.43–1.29)
CRP SERPL-MCNC: 2.22 MG/DL (ref 0–0.99)
DEPRECATED RDW RBC AUTO: 44.6 FL (ref 37–54)
EOSINOPHIL # BLD AUTO: 0.22 10*3/MM3 (ref 0–0.7)
EOSINOPHIL NFR BLD AUTO: 2.3 % (ref 0–7)
ERYTHROCYTE [DISTWIDTH] IN BLOOD BY AUTOMATED COUNT: 13.6 % (ref 11.5–14.5)
GFR SERPL CREATININE-BSD FRML MDRD: 74 ML/MIN/1.73
GLUCOSE BLD-MCNC: 106 MG/DL (ref 70–110)
GLUCOSE BLDC GLUCOMTR-MCNC: 115 MG/DL (ref 70–130)
GLUCOSE BLDC GLUCOMTR-MCNC: 119 MG/DL (ref 70–130)
GLUCOSE BLDC GLUCOMTR-MCNC: 151 MG/DL (ref 70–130)
HCT VFR BLD AUTO: 36.7 % (ref 37–47)
HGB BLD-MCNC: 11.6 G/DL (ref 12–16)
IMM GRANULOCYTES # BLD: 0.01 10*3/MM3 (ref 0–0.03)
IMM GRANULOCYTES NFR BLD: 0.1 % (ref 0–0.5)
LYMPHOCYTES # BLD AUTO: 2.07 10*3/MM3 (ref 1–3)
LYMPHOCYTES NFR BLD AUTO: 22.1 % (ref 16–46)
MAGNESIUM SERPL-MCNC: 2.6 MG/DL (ref 1.7–2.6)
MCH RBC QN AUTO: 29.4 PG (ref 27–33)
MCHC RBC AUTO-ENTMCNC: 31.6 G/DL (ref 33–37)
MCV RBC AUTO: 93.1 FL (ref 80–94)
MONOCYTES # BLD AUTO: 0.65 10*3/MM3 (ref 0.1–0.9)
MONOCYTES NFR BLD AUTO: 6.9 % (ref 0–12)
NEUTROPHILS # BLD AUTO: 6.39 10*3/MM3 (ref 1.4–6.5)
NEUTROPHILS NFR BLD AUTO: 68.3 % (ref 40–75)
OSMOLALITY SERPL CALC.SUM OF ELEC: 287.4 MOSM/KG (ref 273–305)
PLATELET # BLD AUTO: 167 10*3/MM3 (ref 130–400)
PMV BLD AUTO: 11.5 FL (ref 6–10)
POTASSIUM BLD-SCNC: 3.7 MMOL/L (ref 3.5–5.3)
RBC # BLD AUTO: 3.94 10*6/MM3 (ref 4.2–5.4)
SODIUM BLD-SCNC: 144 MMOL/L (ref 135–153)
WBC NRBC COR # BLD: 9.37 10*3/MM3 (ref 4.5–12.5)

## 2017-10-21 PROCEDURE — 83735 ASSAY OF MAGNESIUM: CPT | Performed by: PHYSICIAN ASSISTANT

## 2017-10-21 PROCEDURE — 63710000001 INSULIN ASPART PER 5 UNITS: Performed by: INTERNAL MEDICINE

## 2017-10-21 PROCEDURE — 85025 COMPLETE CBC W/AUTO DIFF WBC: CPT | Performed by: INTERNAL MEDICINE

## 2017-10-21 PROCEDURE — 99233 SBSQ HOSP IP/OBS HIGH 50: CPT | Performed by: INTERNAL MEDICINE

## 2017-10-21 PROCEDURE — 86140 C-REACTIVE PROTEIN: CPT | Performed by: INTERNAL MEDICINE

## 2017-10-21 PROCEDURE — 82962 GLUCOSE BLOOD TEST: CPT

## 2017-10-21 PROCEDURE — 25010000002 HEPARIN (PORCINE) PER 1000 UNITS: Performed by: INTERNAL MEDICINE

## 2017-10-21 PROCEDURE — 80048 BASIC METABOLIC PNL TOTAL CA: CPT | Performed by: INTERNAL MEDICINE

## 2017-10-21 RX ADMIN — HEPARIN SODIUM 5000 UNITS: 5000 INJECTION, SOLUTION INTRAVENOUS; SUBCUTANEOUS at 20:46

## 2017-10-21 RX ADMIN — MEGESTROL ACETATE 400 MG: 40 SUSPENSION ORAL at 18:21

## 2017-10-21 RX ADMIN — ATORVASTATIN CALCIUM 40 MG: 40 TABLET, FILM COATED ORAL at 20:45

## 2017-10-21 RX ADMIN — INSULIN ASPART 2 UNITS: 100 INJECTION, SOLUTION INTRAVENOUS; SUBCUTANEOUS at 18:22

## 2017-10-21 RX ADMIN — HEPARIN SODIUM 5000 UNITS: 5000 INJECTION, SOLUTION INTRAVENOUS; SUBCUTANEOUS at 08:27

## 2017-10-21 RX ADMIN — Medication 10 ML: at 20:45

## 2017-10-21 RX ADMIN — ESCITALOPRAM 10 MG: 10 TABLET, FILM COATED ORAL at 20:45

## 2017-10-21 RX ADMIN — CLONAZEPAM 0.5 MG: 0.5 TABLET ORAL at 20:45

## 2017-10-21 RX ADMIN — Medication 1 CAPSULE: at 08:27

## 2017-10-21 RX ADMIN — MEMANTINE HYDROCHLORIDE 10 MG: 10 TABLET ORAL at 08:27

## 2017-10-21 RX ADMIN — MEMANTINE HYDROCHLORIDE 10 MG: 10 TABLET ORAL at 20:45

## 2017-10-21 RX ADMIN — ACETAMINOPHEN 650 MG: 325 TABLET ORAL at 20:46

## 2017-10-21 RX ADMIN — DONEPEZIL HYDROCHLORIDE 10 MG: 5 TABLET, FILM COATED ORAL at 20:45

## 2017-10-21 RX ADMIN — MEGESTROL ACETATE 400 MG: 40 SUSPENSION ORAL at 08:27

## 2017-10-21 RX ADMIN — ONDANSETRON 4 MG: 4 TABLET, FILM COATED ORAL at 20:45

## 2017-10-21 RX ADMIN — LEVOTHYROXINE SODIUM 25 MCG: 25 TABLET ORAL at 20:45

## 2017-10-21 RX ADMIN — SODIUM CHLORIDE 60 ML/HR: 9 INJECTION, SOLUTION INTRAVENOUS at 08:26

## 2017-10-21 RX ADMIN — ERTAPENEM SODIUM 1 G: 1 INJECTION, POWDER, LYOPHILIZED, FOR SOLUTION INTRAMUSCULAR; INTRAVENOUS at 13:25

## 2017-10-21 RX ADMIN — ACETAMINOPHEN 650 MG: 325 TABLET ORAL at 02:36

## 2017-10-21 NOTE — PROGRESS NOTES
Subjective     History:   Luz Beasley is a 77 y.o. female admitted on 10/17/2017 secondary to UTI (urinary tract infection)     Procedures: None    Patient seen and examined with CHRISTINE Eckert. Awake and alert but remains confused. Oriented to person only. States she feels cold but denies any other complaints. States her appetite is good which is an improvement. RN reports patient her breakfast with some encouragement. No acute events overnight per RN.     History taken from: chart, and RN.      Objective     Vital Signs  Temp:  [97.9 °F (36.6 °C)-98.9 °F (37.2 °C)] 98.4 °F (36.9 °C)  Heart Rate:  [64-80] 64  Resp:  [18-20] 18  BP: (108-156)/(69-91) 140/80    Intake/Output Summary (Last 24 hours) at 10/21/17 1533  Last data filed at 10/21/17 1300   Gross per 24 hour   Intake              360 ml   Output                0 ml   Net              360 ml         Physical Exam:  General:    Awake, alert but confused (oriented to person only), in no acute distress, able to follow simple commands    Heart:      Normal S1 and S2. Regular rate and rhythm. No significant murmur, rubs or gallops appreciated.   Lungs:     Respirations regular, even and unlabored. Lungs clear to auscultation B/L. No wheezes, rales or rhonchi.   Abdomen:   Soft and nontender. No guarding, rebound tenderness or  organomegaly noted. Bowel sounds present x 4.   Extremities:  No clubbing, cyanosis or edema noted. Moves UE and LE equally B/L.     Results Review:      Results from last 7 days  Lab Units 10/21/17  0055 10/20/17  0055 10/19/17  0047 10/18/17  0102 10/17/17  1818   WBC 10*3/mm3 9.37 9.48 9.08 7.85 12.03   HEMOGLOBIN g/dL 11.6* 12.1 12.3 12.8 14.5   PLATELETS 10*3/mm3 167 167 157 158 188       Results from last 7 days  Lab Units 10/21/17  0055 10/20/17  1858 10/20/17  0055 10/19/17  0047 10/18/17  0102 10/17/17  1818   SODIUM mmol/L 144  --  142 140 143 142   POTASSIUM mmol/L 3.7 3.2* 3.2* 3.3* 3.8 3.5   CHLORIDE mmol/L 113*  --  114* 112 113*  108   CO2 mmol/L 22.4*  --  23.4* 20.6* 23.8* 23.9*   BUN mg/dL 13  --  14 14 23* 28*   CREATININE mg/dL 0.76  --  0.75 0.73 0.94 1.21   CALCIUM mg/dL 8.3  --  8.9 8.8 9.2 10.5*   GLUCOSE mg/dL 106  --  140* 128* 120* 153*       Results from last 7 days  Lab Units 10/19/17  0047 10/17/17  1818   BILIRUBIN mg/dL 1.4 1.6   ALK PHOS U/L 86 113*   AST (SGOT) U/L 30 30   ALT (SGPT) U/L 17 23       Results from last 7 days  Lab Units 10/21/17  0055 10/20/17  0055   MAGNESIUM mg/dL 2.6 1.7           Results from last 7 days  Lab Units 10/17/17  1818   TROPONIN I ng/mL 0.040       Imaging Results (last 24 hours)     ** No results found for the last 24 hours. **            Medications:    atorvastatin 40 mg Oral Nightly   donepezil 10 mg Oral Nightly   ertapenem 1 g Intravenous Q24H   escitalopram 10 mg Oral Nightly   heparin (porcine) 5,000 Units Subcutaneous Q12H   insulin aspart 0-7 Units Subcutaneous 4x Daily AC & at Bedtime   levothyroxine 25 mcg Oral Nightly   megestrol 400 mg Oral BID   memantine 10 mg Oral Q12H   Risaquad-2 1 capsule Oral Daily   Vortioxetine HBr 10 mg Oral Nightly              Assessment/Plan   Severe sepsis: Pt met sepsis criteria upon admission with HR>90 and elevated CRP. Likely 2/2 UTI. Evidence of end organ dysfunction with worsening confusion superimposed on her baseline dementia. Currently afebrile and hemodynamically stable. Lactate normal upon admission. WBC remains stable. CRP remains slightly elevated. Now on Invanz with urine culture revealing possible ESBL . Stop IVF's. Repeat labs in the AM.     E coli UTI (suspected ESBL ): Rocephin changed to Invanz today and ID consulted. Repeat labs in the AM. ID input appreciated.     Metabolic encephalopathy superimposed on baseline dementia: CT head negative for any acute changes. TSH is normal. Cont treatment of sepsis as outlined above. Cont home medication regimen and supportive treatment.     Dehydration: Megace added in the  setting of poor PO intake. Appetite and intake improved today. Will stop IVF's. Repeat labs in the AM.     Mild hypercalcemia: Likely 2/2 dehydration. Now resolved. Repeat labs in the AM.     Hypokalemia: Improved today. Mg is >2. Cont electrolyte replacement protocols and repeat labs in the AM.     Generalized weakness/debility: Cont treatment as outlined above. PT/OT consulted. Pt's family interested in rehab placement and SS has worked to arrange placement following pt's qualifying hospital stay. Pt was able to go to SNF today but this was postponed 2/2 urine culture being finalized as possible ESBL E coli.     Hypothyroidism: TSH is normal. Cont home Synthroid.     DM II, non-insulin dependent: HgbA1c is 5.8. Holding home metformin. Cont SSI with Accuchecks.     DVT PPX: SQ heparin    Pt is at high risk 2/2 severe sepsis, UTI, metabolic encephalopathy superimposed on baseline dementia, dehydration and debility.         Yusef Rivera,   10/21/17  3:33 PM

## 2017-10-21 NOTE — PLAN OF CARE
Problem: Patient Care Overview (Adult)  Goal: Plan of Care Review  Outcome: Ongoing (interventions implemented as appropriate)    10/20/17 1823   Patient Care Overview   Progress improving   Coping/Psychosocial Response Interventions   Plan Of Care Reviewed With patient       Goal: Adult Individualization and Mutuality  Outcome: Ongoing (interventions implemented as appropriate)  Goal: Discharge Needs Assessment  Outcome: Ongoing (interventions implemented as appropriate)    Problem: Fall Risk (Adult)  Goal: Identify Related Risk Factors and Signs and Symptoms  Outcome: Ongoing (interventions implemented as appropriate)    10/17/17 2243   Fall Risk   Fall Risk: Related Risk Factors age-related changes;environment unfamiliar;fear of falling;fatigue/slow reaction;gait/mobility problems   Fall Risk: Signs and Symptoms presence of risk factors       Goal: Absence of Falls  Outcome: Ongoing (interventions implemented as appropriate)    10/21/17 1205   Fall Risk (Adult)   Absence of Falls making progress toward outcome         Problem: Infection, Risk/Actual (Adult)  Goal: Identify Related Risk Factors and Signs and Symptoms  Outcome: Ongoing (interventions implemented as appropriate)    10/17/17 2243   Infection, Risk/Actual   Infection, Risk/Actual: Related Risk Factors age extremes   Signs and Symptoms (Infection, Risk/Actual) weakness         10/17/17 2243   Infection, Risk/Actual   Infection, Risk/Actual: Related Risk Factors age extremes   Signs and Symptoms (Infection, Risk/Actual) weakness         10/17/17 2243   Infection, Risk/Actual   Infection, Risk/Actual: Related Risk Factors age extremes   Signs and Symptoms (Infection, Risk/Actual) weakness       Goal: Infection Prevention/Resolution    10/21/17 1205   Infection, Risk/Actual (Adult)   Infection Prevention/Resolution making progress toward outcome         Problem: Pressure Ulcer Risk (Calin Scale) (Adult,Obstetrics,Pediatric)  Goal: Identify Related Risk  Factors and Signs and Symptoms  Outcome: Ongoing (interventions implemented as appropriate)    10/17/17 2243   Pressure Ulcer Risk (Calin Scale)   Related Risk Factors (Pressure Ulcer Risk (Calin Scale)) age extremes;medical devices;mobility impaired;nutritional deficiencies       Goal: Skin Integrity  Outcome: Ongoing (interventions implemented as appropriate)    10/21/17 1205   Pressure Ulcer Risk (Calin Scale) (Adult,Obstetrics,Pediatric)   Skin Integrity making progress toward outcome

## 2017-10-21 NOTE — CONSULTS
INFECTIOUS DISEASE CONSULTATION REPORT      Referring Provider: Dr. Osman  Reason for Consultation: UTI      Principal problem: UTI (urinary tract infection)    Subjective .     History of present illness:    As you well know Dr. Osman, MsMiranda Beasley is a 77 y.o. years old female with past medical history significant for dementia, depression, hyperlipidemia, hypertension and obesity, who presented to [facility] Emergency Department on 10/17/2017 for decreased appetite, dehydration, and worsening decline at home.  CRP 2.22 and normal white count.  Urine culture from 12/17/17 finalized as greater than 100,000 colonies of Escherichia coli suspected to be an ESBL susceptible to ertapenem.    Infectious Disease consultation was requested for antimicrobial management.     History taken from: patient chart RN    Case was discussed with patient, nursing staff, primary care team and consulting provider    Review of Systems     Constitutional: Dementia, no fever, chills and night sweats. No appetite change or unexpected weight change. No fatigue.  Eyes: no eye drainage, itching or redness.  HEENT: no mouth sores, dysphagia or nose bleed.  Respiratory: no for shortness of breath, cough or production of sputum.  Cardiovascular: no chest pain, no palpitations, no orthopnea.  Gastrointestinal: no nausea, vomiting or diarrhea. No abdominal pain, hematemesis or rectal bleeding.  Genitourinary: See history of present illness  Hematologic/lymphatic: no lymph node abnormalities, no easy bruising or easy bleeding.  Musculoskeletal: no muscle or joint pain.  Skin: No rash and no itching.  Neurological: Dementia  Behavioral/Psych: See history of present illness  Endocrine: no hot flashes.  Immunologic: negative.    Past Medical History    Past Medical History:   Diagnosis Date   • Dementia    • Depression    • Diabetes mellitus    • Hyperlipidemia    • Hypertension    • Obesity    • Senile dementia    • Vitamin D  "deficiency disease        Past Surgical History    Past Surgical History:   Procedure Laterality Date   • CARDIAC CATHETERIZATION  05/05/2006   • CARDIOVASCULAR STRESS TEST  05/05/2006   • COLONOSCOPY  03/21/2008   • HYSTERECTOMY     • THYROID SURGERY         Family History    Family History   Problem Relation Age of Onset   • Family history unknown: Yes       Social History    Social History   Substance Use Topics   • Smoking status: Never Smoker   • Smokeless tobacco: Never Used   • Alcohol use No       Allergies    Review of patient's allergies indicates no known allergies.    Objective     /91 (Patient Position: Lying)  Pulse 78  Temp 98.6 °F (37 °C) (Oral)   Resp 18  Ht 66\" (167.6 cm)  Wt 182 lb 14.4 oz (83 kg)  SpO2 96%  BMI 29.52 kg/m2    Temp:  [97.9 °F (36.6 °C)-98.9 °F (37.2 °C)] 98.6 °F (37 °C)        Intake/Output Summary (Last 24 hours) at 10/21/17 1220  Last data filed at 10/21/17 0900   Gross per 24 hour   Intake              360 ml   Output                0 ml   Net              360 ml          General Appearance:     Confused in no acute distress   Head:    Normocephalic, without obvious abnormality, atraumatic   Eyes:            Lids and lashes normal, conjunctivae and sclerae normal, no   icterus, no pallor, corneas clear, PERRLA   Ears:    Ears appear intact with no abnormalities noted   Throat:   No oral lesions, no thrush, oral mucosa moist   Neck:   No adenopathy, supple, trachea midline, no thyromegaly, no   carotid bruit, no JVD   Back:     No tenderness to percussion or palpation, range of motion   normal   Lungs:    Decreased in the bases     Heart:    Regular rhythm and normal rate, normal S1 and S2, no            murmur, no gallop, no rub, no click   Chest Wall:    No abnormalities observed   Abdomen:     Normal bowel sounds, no masses, no organomegaly, soft        non-tender, non-distended, no guarding, no rebound                tenderness   Rectal:     Deferred "   Extremities:   Moves all extremities well, no edema, no cyanosis, no             redness   Pulses:   Pulses palpable and equal bilaterally   Skin:   No bleeding, bruising or rash   Lymph nodes:   No palpable adenopathy   Neurologic:   Dementia, confused .       Results:      Results from last 7 days  Lab Units 10/21/17  0055 10/20/17  0055 10/19/17  0047 10/18/17  0102 10/17/17  1818   WBC 10*3/mm3 9.37 9.48 9.08 7.85 12.03     Lab Results   Component Value Date    NEUTROABS 6.39 10/21/2017         Results from last 7 days  Lab Units 10/21/17  0055   CREATININE mg/dL 0.76         Results from last 7 days  Lab Units 10/21/17  0055 10/20/17  0055 10/19/17  0047   CRP mg/dL 2.22* 2.52* 2.39*       Imaging Results (last 24 hours)     ** No results found for the last 24 hours. **            Cultures:    Blood Culture   Date Value Ref Range Status   10/17/2017 No growth at 3 days  Preliminary   10/17/2017 No growth at 3 days  Preliminary     Urine Culture   Date Value Ref Range Status   10/17/2017 >100,000 CFU/mL Escherichia coli (A)  Final     Comment:     This organism is a suspected ESBL .  Strains of Klebsiella spp. E coli, and a few other genera of the Enterobacteriaceae family that produce extended spectrum beta lactamase (ESBL) may be clinically resistant to therapy with penicillins, ceph  alosporins, or aztreonam, despite apparent in vitro susceptibility to some of these agents.       Results Review:    I have personally reviewed laboratory data, culture results, radiology studies and antimicrobial therapy.    Hospital Medications (active)       Dose Frequency Start End    acetaminophen (TYLENOL) tablet 650 mg 650 mg Every 4 Hours PRN 10/17/2017     Sig - Route: Take 2 tablets by mouth Every 4 (Four) Hours As Needed for Mild Pain  or Headache. - Oral    atorvastatin (LIPITOR) tablet 40 mg 40 mg Nightly 10/18/2017     Sig - Route: Take 1 tablet by mouth Every Night. - Oral    bisacodyl (DULCOLAX)  suppository 10 mg 10 mg Daily PRN 10/17/2017     Sig - Route: Insert 1 suppository into the rectum Daily As Needed for Constipation. - Rectal    clonazePAM (KlonoPIN) tablet 0.5 mg 0.5 mg 2 Times Daily PRN 10/18/2017     Sig - Route: Take 1 tablet by mouth 2 (Two) Times a Day As Needed for Anxiety. - Oral    dextrose (D50W) solution 25 g 25 g Every 15 Minutes PRN 10/17/2017     Sig - Route: Infuse 50 mL into a venous catheter Every 15 (Fifteen) Minutes As Needed for Low Blood Sugar (Blood Sugar Less Than 70, Patient Has IV Access - Unresponsive, NPO or Unable To Safely Swallow). - Intravenous    dextrose (GLUTOSE) oral gel 15 g 15 g Every 15 Minutes PRN 10/17/2017     Sig - Route: Take 15 g by mouth Every 15 (Fifteen) Minutes As Needed for Low Blood Sugar (Blood Sugar Less Than 70, Patient Alert, Is Not NPO & Can Safely Swallow). - Oral    donepezil (ARICEPT) tablet 10 mg 10 mg Nightly 10/18/2017     Sig - Route: Take 2 tablets by mouth Every Night. - Oral    ertapenem (INVanz) 1 g/100 mL 0.9% NS VTB (mbp) 1 g Every 24 Hours 10/21/2017     Sig - Route: Infuse 100 mL into a venous catheter Daily. - Intravenous    escitalopram (LEXAPRO) tablet 10 mg 10 mg Nightly 10/18/2017     Sig - Route: Take 1 tablet by mouth Every Night. - Oral    glucagon (GLUCAGEN) injection 1 mg 1 mg Every 15 Minutes PRN 10/17/2017     Sig - Route: Inject 1 mg under the skin Every 15 (Fifteen) Minutes As Needed (Blood Glucose Less Than 70 - Patient Without IV Access - Unresponsive, NPO or Unable To Safely Swallow). - Subcutaneous    heparin (porcine) 5000 UNIT/ML injection 5,000 Units 5,000 Units Every 12 Hours Scheduled 10/17/2017     Sig - Route: Inject 1 mL under the skin Every 12 (Twelve) Hours. - Subcutaneous    influenza vac split quad (FLUZONE,FLUARIX,AFLURIA) injection 0.5 mL 0.5 mL During Hospitalization 10/17/2017 10/20/2017    Sig - Route: Inject 0.5 mL into the shoulder, thigh, or buttocks During Hospitalization for Immunization. -  "Intramuscular    insulin aspart (novoLOG) injection 0-7 Units 0-7 Units 4 Times Daily Before Meals & Nightly 10/18/2017     Sig - Route: Inject 0-7 Units under the skin 4 (Four) Times a Day Before Meals & at Bedtime. - Subcutaneous    levothyroxine (SYNTHROID, LEVOTHROID) tablet 25 mcg 25 mcg Nightly 10/18/2017     Sig - Route: Take 1 tablet by mouth Every Night. - Oral    Cosign for Ordering: Required by Yusef Rivera, DO    Magnesium Sulfate 2 gram Bolus, followed by 8 gram infusion (total Mg dose 10 grams)- Mg less than or equal to 1mg/dL 2 g As Needed 10/20/2017     Sig - Route: Infuse 50 mL into a venous catheter As Needed (Mg less than or equal to 1mg/dL). - Intravenous    Linked Group 1:  \"Or\" Linked Group Details        magnesium sulfate 4 gram infusion- Mg 1.6-1.9 mg/dL 4 g As Needed 10/20/2017     Sig - Route: Infuse 100 mL into a venous catheter As Needed (Mg 1.6-1.9 mg/dL). - Intravenous    Linked Group 1:  \"Or\" Linked Group Details        magnesium sulfate 4 gram infusion- Mg 1.6-1.9 mg/dL 4 g Once 10/20/2017 10/20/2017    Sig - Route: Infuse 100 mL into a venous catheter 1 (One) Time. - Intravenous    Magnesium Sulfate 6 gram Infusion (2 gm x 3) -Mg 1.1 -1.5 mg/dL 2 g As Needed 10/20/2017     Sig - Route: Infuse 50 mL into a venous catheter As Needed (Mg 1.1 -1.5 mg/dL). - Intravenous    Linked Group 1:  \"Or\" Linked Group Details        megestrol (MEGACE) 40 MG/ML suspension 400 mg 400 mg 2 Times Daily 10/20/2017     Sig - Route: Take 10 mL by mouth 2 (Two) Times a Day. - Oral    memantine (NAMENDA) tablet 10 mg 10 mg Every 12 Hours Scheduled 10/18/2017     Sig - Route: Take 1 tablet by mouth Every 12 (Twelve) Hours. - Oral    ondansetron (ZOFRAN) tablet 4 mg 4 mg Every 6 Hours PRN 10/17/2017     Sig - Route: Take 1 tablet by mouth Every 6 (Six) Hours As Needed for Nausea or Vomiting. - Oral    potassium chloride (K-DUR,KLOR-CON) CR tablet 40 mEq 40 mEq Every 4 Hours 10/20/2017 10/20/2017    " "Sig - Route: Take 2 tablets by mouth Every 4 (Four) Hours. - Oral    potassium chloride (KLOR-CON) packet 40 mEq 40 mEq As Needed 10/20/2017     Sig - Route: Take 40 mEq by mouth As Needed (potassium replacement, see admin instructions). - Oral    Cosign for Ordering: Required by Yusef Rivera DO    Linked Group 2:  \"Or\" Linked Group Details        potassium chloride (MICRO-K) CR capsule 40 mEq 40 mEq As Needed 10/20/2017     Sig - Route: Take 4 capsules by mouth As Needed (potassium replacement.  see admin instructions). - Oral    Cosign for Ordering: Required by Yusef Rivera DO    Linked Group 2:  \"Or\" Linked Group Details        potassium chloride 10 mEq in 100 mL IVPB 10 mEq Every 1 Hour PRN 10/20/2017     Sig - Route: Infuse 100 mL into a venous catheter Every 1 (One) Hour As Needed (potassium protocol PERIPHERAL - see admin instructions). - Intravenous    Cosign for Ordering: Required by Yusef Rivera DO    Linked Group 2:  \"Or\" Linked Group Details        Risaquad-2 capsule 1 capsule 1 capsule Daily 10/18/2017     Sig - Route: Take 1 capsule by mouth Daily. - Oral    sodium chloride 0.9 % flush 1-10 mL 1-10 mL As Needed 10/17/2017     Sig - Route: Infuse 1-10 mL into a venous catheter As Needed for Line Care. - Intravenous    sodium chloride 0.9 % flush 10 mL 10 mL As Needed 10/17/2017     Sig - Route: Infuse 10 mL into a venous catheter As Needed for Line Care. - Intravenous    Linked Group 3:  \"And\" Linked Group Details        sodium chloride 0.9 % infusion 60 mL/hr Continuous 10/17/2017     Sig - Route: Infuse 60 mL/hr into a venous catheter Continuous. - Intravenous    Vortioxetine HBr tablet 10 mg 10 mg Nightly 10/18/2017     Sig - Route: Take 10 mg by mouth Every Night. - Oral    Non-formulary Exception Code: Patient supplied medication    cefTRIAXone (ROCEPHIN) 1 g/100 mL 0.9% NS (MBP) (Discontinued) 1 g Every 24 Hours 10/18/2017 10/21/2017    Sig - Route: Infuse 100 mL into " a venous catheter Daily. - Intravenous              Assessment/Plan     ASSESSMENT:    #1 UTI    PLAN:    Patient reported to have decreased appetite, dehydration, and worsening decline at home.  CRP 2.22 and normal white count.  Urine culture from 12/17/17 finalized as greater than 100,000 colonies of Escherichia coli suspected to be an ESBL susceptible to ertapenem.    In the setting of positive urinary culture for Escherichia coli and suspected ESBL organism, recommend to continue Invanz 1 g every 24 hours as culture shows ertapenem susceptibility.  Blood cultures from 10/17/17 so far show no growth.  We'll continue to follow closely.    Patient's findings and recommendations were discussed with patient, nursing staff, primary care team and consulting provider    Code Status: Full Code    SHAI Gallegos  10/21/17  12:20 PM

## 2017-10-22 LAB
ALBUMIN SERPL-MCNC: 3 G/DL (ref 3.4–4.8)
ALBUMIN/GLOB SERPL: 1.4 G/DL (ref 1.5–2.5)
ALP SERPL-CCNC: 78 U/L (ref 35–104)
ALT SERPL W P-5'-P-CCNC: 17 U/L (ref 10–36)
ANION GAP SERPL CALCULATED.3IONS-SCNC: 3.3 MMOL/L (ref 3.6–11.2)
AST SERPL-CCNC: 27 U/L (ref 10–30)
BACTERIA SPEC AEROBE CULT: NORMAL
BACTERIA SPEC AEROBE CULT: NORMAL
BASOPHILS # BLD AUTO: 0.03 10*3/MM3 (ref 0–0.3)
BASOPHILS NFR BLD AUTO: 0.4 % (ref 0–2)
BILIRUB SERPL-MCNC: 0.7 MG/DL (ref 0.2–1.8)
BUN BLD-MCNC: 17 MG/DL (ref 7–21)
BUN/CREAT SERPL: 23 (ref 7–25)
CALCIUM SPEC-SCNC: 8.4 MG/DL (ref 7.7–10)
CHLORIDE SERPL-SCNC: 116 MMOL/L (ref 99–112)
CO2 SERPL-SCNC: 24.7 MMOL/L (ref 24.3–31.9)
CREAT BLD-MCNC: 0.74 MG/DL (ref 0.43–1.29)
CRP SERPL-MCNC: 2.49 MG/DL (ref 0–0.99)
DEPRECATED RDW RBC AUTO: 46.3 FL (ref 37–54)
EOSINOPHIL # BLD AUTO: 0.28 10*3/MM3 (ref 0–0.7)
EOSINOPHIL NFR BLD AUTO: 3.8 % (ref 0–7)
ERYTHROCYTE [DISTWIDTH] IN BLOOD BY AUTOMATED COUNT: 14.2 % (ref 11.5–14.5)
GFR SERPL CREATININE-BSD FRML MDRD: 76 ML/MIN/1.73
GLOBULIN UR ELPH-MCNC: 2.1 GM/DL
GLUCOSE BLD-MCNC: 113 MG/DL (ref 70–110)
GLUCOSE BLDC GLUCOMTR-MCNC: 100 MG/DL (ref 70–130)
GLUCOSE BLDC GLUCOMTR-MCNC: 108 MG/DL (ref 70–130)
GLUCOSE BLDC GLUCOMTR-MCNC: 108 MG/DL (ref 70–130)
GLUCOSE BLDC GLUCOMTR-MCNC: 111 MG/DL (ref 70–130)
GLUCOSE BLDC GLUCOMTR-MCNC: 117 MG/DL (ref 70–130)
GLUCOSE BLDC GLUCOMTR-MCNC: 119 MG/DL (ref 70–130)
GLUCOSE BLDC GLUCOMTR-MCNC: 119 MG/DL (ref 70–130)
GLUCOSE BLDC GLUCOMTR-MCNC: 120 MG/DL (ref 70–130)
GLUCOSE BLDC GLUCOMTR-MCNC: 128 MG/DL (ref 70–130)
GLUCOSE BLDC GLUCOMTR-MCNC: 139 MG/DL (ref 70–130)
GLUCOSE BLDC GLUCOMTR-MCNC: 141 MG/DL (ref 70–130)
GLUCOSE BLDC GLUCOMTR-MCNC: 155 MG/DL (ref 70–130)
GLUCOSE BLDC GLUCOMTR-MCNC: 170 MG/DL (ref 70–130)
GLUCOSE BLDC GLUCOMTR-MCNC: 97 MG/DL (ref 70–130)
HCT VFR BLD AUTO: 35.7 % (ref 37–47)
HGB BLD-MCNC: 11 G/DL (ref 12–16)
IMM GRANULOCYTES # BLD: 0.01 10*3/MM3 (ref 0–0.03)
IMM GRANULOCYTES NFR BLD: 0.1 % (ref 0–0.5)
LYMPHOCYTES # BLD AUTO: 2.51 10*3/MM3 (ref 1–3)
LYMPHOCYTES NFR BLD AUTO: 34.1 % (ref 16–46)
MAGNESIUM SERPL-MCNC: 2.2 MG/DL (ref 1.7–2.6)
MCH RBC QN AUTO: 29.3 PG (ref 27–33)
MCHC RBC AUTO-ENTMCNC: 30.8 G/DL (ref 33–37)
MCV RBC AUTO: 95.2 FL (ref 80–94)
MONOCYTES # BLD AUTO: 0.45 10*3/MM3 (ref 0.1–0.9)
MONOCYTES NFR BLD AUTO: 6.1 % (ref 0–12)
NEUTROPHILS # BLD AUTO: 4.07 10*3/MM3 (ref 1.4–6.5)
NEUTROPHILS NFR BLD AUTO: 55.5 % (ref 40–75)
OSMOLALITY SERPL CALC.SUM OF ELEC: 289.2 MOSM/KG (ref 273–305)
PHOSPHATE SERPL-MCNC: 1.9 MG/DL (ref 2.7–4.5)
PLATELET # BLD AUTO: 175 10*3/MM3 (ref 130–400)
PMV BLD AUTO: 11.5 FL (ref 6–10)
POTASSIUM BLD-SCNC: 3.7 MMOL/L (ref 3.5–5.3)
PROT SERPL-MCNC: 5.1 G/DL (ref 6–8)
RBC # BLD AUTO: 3.75 10*6/MM3 (ref 4.2–5.4)
SODIUM BLD-SCNC: 144 MMOL/L (ref 135–153)
WBC NRBC COR # BLD: 7.35 10*3/MM3 (ref 4.5–12.5)

## 2017-10-22 PROCEDURE — 82962 GLUCOSE BLOOD TEST: CPT

## 2017-10-22 PROCEDURE — 80053 COMPREHEN METABOLIC PANEL: CPT | Performed by: INTERNAL MEDICINE

## 2017-10-22 PROCEDURE — 25010000002 HEPARIN (PORCINE) PER 1000 UNITS: Performed by: INTERNAL MEDICINE

## 2017-10-22 PROCEDURE — 85025 COMPLETE CBC W/AUTO DIFF WBC: CPT | Performed by: INTERNAL MEDICINE

## 2017-10-22 PROCEDURE — 99233 SBSQ HOSP IP/OBS HIGH 50: CPT | Performed by: INTERNAL MEDICINE

## 2017-10-22 PROCEDURE — 84100 ASSAY OF PHOSPHORUS: CPT | Performed by: INTERNAL MEDICINE

## 2017-10-22 PROCEDURE — 86140 C-REACTIVE PROTEIN: CPT | Performed by: INTERNAL MEDICINE

## 2017-10-22 PROCEDURE — 83735 ASSAY OF MAGNESIUM: CPT | Performed by: INTERNAL MEDICINE

## 2017-10-22 RX ADMIN — LEVOTHYROXINE SODIUM 25 MCG: 25 TABLET ORAL at 20:29

## 2017-10-22 RX ADMIN — ESCITALOPRAM 10 MG: 10 TABLET, FILM COATED ORAL at 20:29

## 2017-10-22 RX ADMIN — ACETAMINOPHEN 650 MG: 325 TABLET ORAL at 20:30

## 2017-10-22 RX ADMIN — ATORVASTATIN CALCIUM 40 MG: 40 TABLET, FILM COATED ORAL at 20:29

## 2017-10-22 RX ADMIN — DONEPEZIL HYDROCHLORIDE 10 MG: 5 TABLET, FILM COATED ORAL at 20:29

## 2017-10-22 RX ADMIN — POTASSIUM PHOSPHATE, MONOBASIC AND POTASSIUM PHOSPHATE, DIBASIC 15 MMOL: 224; 236 INJECTION, SOLUTION INTRAVENOUS at 15:39

## 2017-10-22 RX ADMIN — HEPARIN SODIUM 5000 UNITS: 5000 INJECTION, SOLUTION INTRAVENOUS; SUBCUTANEOUS at 20:29

## 2017-10-22 RX ADMIN — CLONAZEPAM 0.5 MG: 0.5 TABLET ORAL at 20:30

## 2017-10-22 RX ADMIN — MEMANTINE HYDROCHLORIDE 10 MG: 10 TABLET ORAL at 08:50

## 2017-10-22 RX ADMIN — MEGESTROL ACETATE 400 MG: 40 SUSPENSION ORAL at 09:35

## 2017-10-22 RX ADMIN — MEGESTROL ACETATE 400 MG: 40 SUSPENSION ORAL at 17:37

## 2017-10-22 RX ADMIN — ERTAPENEM SODIUM 1 G: 1 INJECTION, POWDER, LYOPHILIZED, FOR SOLUTION INTRAMUSCULAR; INTRAVENOUS at 13:51

## 2017-10-22 RX ADMIN — Medication 1 CAPSULE: at 08:50

## 2017-10-22 RX ADMIN — MEMANTINE HYDROCHLORIDE 10 MG: 10 TABLET ORAL at 20:30

## 2017-10-22 RX ADMIN — HEPARIN SODIUM 5000 UNITS: 5000 INJECTION, SOLUTION INTRAVENOUS; SUBCUTANEOUS at 08:50

## 2017-10-22 RX ADMIN — Medication 10 ML: at 20:30

## 2017-10-22 NOTE — PROGRESS NOTES
Subjective     History:   Luz Beasley is a 77 y.o. female admitted on 10/17/2017 secondary to UTI (urinary tract infection)     Procedures: None    Patient seen and examined with CHRISTINE Eckert. Appears more awake today, currently awake in bed watching TV. Remains confused. Denies any current complaints. RN reports pt's appetite has significantly improved with the Megace. No acute events overnight per RN.     History taken from: chart, and RN.      Objective     Vital Signs  Temp:  [97.4 °F (36.3 °C)-98.4 °F (36.9 °C)] 97.4 °F (36.3 °C)  Heart Rate:  [64-98] 75  Resp:  [16-18] 18  BP: (119-140)/(68-85) 120/68    Intake/Output Summary (Last 24 hours) at 10/22/17 1248  Last data filed at 10/21/17 1744   Gross per 24 hour   Intake              600 ml   Output                0 ml   Net              600 ml         Physical Exam:  General:    Awake, alert but confused (oriented to person only), in no acute distress, able to follow simple commands    Heart:      Normal S1 and S2. Regular rate and rhythm. No significant murmur, rubs or gallops appreciated.   Lungs:     Respirations regular, even and unlabored. Lungs clear to auscultation B/L. No wheezes, rales or rhonchi.   Abdomen:   Soft and nontender. No guarding, rebound tenderness or  organomegaly noted. Bowel sounds present x 4.   Extremities:  No clubbing, cyanosis or edema noted. Moves UE and LE equally B/L.     Results Review:      Results from last 7 days  Lab Units 10/22/17  0520 10/21/17  0055 10/20/17  0055 10/19/17  0047 10/18/17  0102 10/17/17  1818   WBC 10*3/mm3 7.35 9.37 9.48 9.08 7.85 12.03   HEMOGLOBIN g/dL 11.0* 11.6* 12.1 12.3 12.8 14.5   PLATELETS 10*3/mm3 175 167 167 157 158 188       Results from last 7 days  Lab Units 10/22/17  0520 10/21/17  0055 10/20/17  1858 10/20/17  0055 10/19/17  0047 10/18/17  0102 10/17/17  1818   SODIUM mmol/L 144 144  --  142 140 143 142   POTASSIUM mmol/L 3.7 3.7 3.2* 3.2* 3.3* 3.8 3.5   CHLORIDE mmol/L 116* 113*  --  114*  112 113* 108   CO2 mmol/L 24.7 22.4*  --  23.4* 20.6* 23.8* 23.9*   BUN mg/dL 17 13  --  14 14 23* 28*   CREATININE mg/dL 0.74 0.76  --  0.75 0.73 0.94 1.21   CALCIUM mg/dL 8.4 8.3  --  8.9 8.8 9.2 10.5*   GLUCOSE mg/dL 113* 106  --  140* 128* 120* 153*       Results from last 7 days  Lab Units 10/22/17  0520 10/19/17  0047 10/17/17  1818   BILIRUBIN mg/dL 0.7 1.4 1.6   ALK PHOS U/L 78 86 113*   AST (SGOT) U/L 27 30 30   ALT (SGPT) U/L 17 17 23       Results from last 7 days  Lab Units 10/22/17  0520 10/21/17  0055 10/20/17  0055   MAGNESIUM mg/dL 2.2 2.6 1.7           Results from last 7 days  Lab Units 10/17/17  1818   TROPONIN I ng/mL 0.040       Imaging Results (last 24 hours)     ** No results found for the last 24 hours. **            Medications:    atorvastatin 40 mg Oral Nightly   donepezil 10 mg Oral Nightly   ertapenem 1 g Intravenous Q24H   escitalopram 10 mg Oral Nightly   heparin (porcine) 5,000 Units Subcutaneous Q12H   insulin aspart 0-7 Units Subcutaneous 4x Daily AC & at Bedtime   levothyroxine 25 mcg Oral Nightly   megestrol 400 mg Oral BID   memantine 10 mg Oral Q12H   Risaquad-2 1 capsule Oral Daily   Vortioxetine HBr 10 mg Oral Nightly              Assessment/Plan   Severe sepsis: Pt met sepsis criteria upon admission with HR>90 and elevated CRP. Likely 2/2 UTI. Evidence of end organ dysfunction with worsening confusion superimposed on her baseline dementia. Currently afebrile and hemodynamically stable. Lactate normal upon admission. WBC remains stable. CRP remains slightly elevated. Now on Invanz with urine culture revealing possible ESBL . Repeat labs in the AM.     E coli UTI (suspected ESBL ): Rocephin changed to Invanz  and ID now following. Repeat labs in the AM. ID input appreciated.     Metabolic encephalopathy superimposed on baseline dementia: CT head negative for any acute changes. TSH is normal. Appears improved from upon admission. Cont treatment of sepsis as  outlined above. Cont home medication regimen and supportive treatment.     Dehydration: Megace added in the setting of poor PO intake. PO intake improving with the addition of Megace. IVF's have been stopped. Repeat labs in the AM.     Mild hypercalcemia: Likely 2/2 dehydration. Now resolved. Repeat labs in the AM.     Hypokalemia: Improved today. Mg is >2. Cont electrolyte replacement protocols and repeat labs in the AM.     Generalized weakness/debility: Cont treatment as outlined above. PT/OT consulted. Pt's family interested in rehab placement and SS has worked to arrange placement following pt's qualifying hospital stay. Pt was able to go to SNF yesterday but this was postponed 2/2 urine culture being finalized as possible ESBL E coli with ID consulted.    Hypothyroidism: TSH is normal. Cont home Synthroid.     DM II, non-insulin dependent: HgbA1c is 5.8. Holding home metformin. Cont SSI with Accuchecks.     DVT PPX: SQ heparin    Pt is at high risk 2/2 severe sepsis, UTI, metabolic encephalopathy superimposed on baseline dementia, dehydration and debility.         Yusef Rivera,   10/22/17  12:48 PM

## 2017-10-22 NOTE — PROGRESS NOTES
"  I have personally seen and examined the patient today and discussed overnight interval progress and pertinent issues with nursing staff.    Subjective:    Patient seems to be comfortable this morning with a normal white count and stable CRP level.  No fever reported and she seems to be stable from infectious disease standpoint on Invanz.    History taken from: patient chart RN      Vital Signs    /82 (BP Location: Right arm, Patient Position: Lying)  Pulse 90  Temp 97.6 °F (36.4 °C) (Axillary)   Resp 18  Ht 66\" (167.6 cm)  Wt 182 lb 4 oz (82.7 kg)  SpO2 97%  BMI 29.42 kg/m2    Temp:  [97.6 °F (36.4 °C)-98.6 °F (37 °C)] 97.6 °F (36.4 °C)      Intake/Output Summary (Last 24 hours) at 10/22/17 1036  Last data filed at 10/21/17 1744   Gross per 24 hour   Intake              600 ml   Output                0 ml   Net              600 ml     Intake & Output (last 3 days)       10/19 0701 - 10/20 0700 10/20 0701 - 10/21 0700 10/21 0701 - 10/22 0700 10/22 0701 - 10/23 0700    P.O.  480 720     Total Intake(mL/kg)  480 (5.8) 720 (8.7)     Urine (mL/kg/hr) 200 (0.1)       Stool 0 (0)       Total Output 200          Net -200 +480 +720              Unmeasured Urine Occurrence 6 x 10 x 8 x     Unmeasured Stool Occurrence 4 x 5 x 4 x           Physical exam:        General Appearance:     Confused in no acute distress   Head:    Normocephalic, without obvious abnormality, atraumatic   Eyes:            Lids and lashes normal, conjunctivae and sclerae normal, no   icterus, no pallor, corneas clear, PERRLA   Ears:    Ears appear intact with no abnormalities noted   Throat:   No oral lesions, no thrush, oral mucosa moist   Neck:   No adenopathy, supple, trachea midline, no thyromegaly, no   carotid bruit, no JVD   Back:     No tenderness to percussion or palpation, range of motion   normal   Lungs:    Decreased in the bases     Heart:    Regular rhythm and normal rate, normal S1 and S2, no            murmur, no gallop, " no rub, no click   Chest Wall:    No abnormalities observed   Abdomen:     Normal bowel sounds, no masses, no organomegaly, soft        non-tender, non-distended, no guarding, no rebound                tenderness   Rectal:     Deferred   Extremities:   Moves all extremities well, no edema, no cyanosis, no             redness   Pulses:   Pulses palpable and equal bilaterally   Skin:   No bleeding, bruising or rash   Lymph nodes:   No palpable adenopathy   Neurologic:   Dementia, confused .         Results:      Results from last 7 days  Lab Units 10/22/17  0520 10/21/17  0055 10/20/17  0055 10/19/17  0047 10/18/17  0102 10/17/17  1818   WBC 10*3/mm3 7.35 9.37 9.48 9.08 7.85 12.03     Lab Results   Component Value Date    NEUTROABS 4.07 10/22/2017         Results from last 7 days  Lab Units 10/22/17  0520   CREATININE mg/dL 0.74         Results from last 7 days  Lab Units 10/22/17  0520 10/21/17  0055 10/20/17  0055   CRP mg/dL 2.49* 2.22* 2.52*         Results Review:    I have personally reviewed laboratory data, culture results, radiology studies and antimicrobial therapy.    Hospital Medications (active)       Dose Frequency Start End    acetaminophen (TYLENOL) tablet 650 mg 650 mg Every 4 Hours PRN 10/17/2017     Sig - Route: Take 2 tablets by mouth Every 4 (Four) Hours As Needed for Mild Pain  or Headache. - Oral    atorvastatin (LIPITOR) tablet 40 mg 40 mg Nightly 10/18/2017     Sig - Route: Take 1 tablet by mouth Every Night. - Oral    bisacodyl (DULCOLAX) suppository 10 mg 10 mg Daily PRN 10/17/2017     Sig - Route: Insert 1 suppository into the rectum Daily As Needed for Constipation. - Rectal    clonazePAM (KlonoPIN) tablet 0.5 mg 0.5 mg 2 Times Daily PRN 10/18/2017     Sig - Route: Take 1 tablet by mouth 2 (Two) Times a Day As Needed for Anxiety. - Oral    dextrose (D50W) solution 25 g 25 g Every 15 Minutes PRN 10/17/2017     Sig - Route: Infuse 50 mL into a venous catheter Every 15 (Fifteen) Minutes As  Needed for Low Blood Sugar (Blood Sugar Less Than 70, Patient Has IV Access - Unresponsive, NPO or Unable To Safely Swallow). - Intravenous    dextrose (GLUTOSE) oral gel 15 g 15 g Every 15 Minutes PRN 10/17/2017     Sig - Route: Take 15 g by mouth Every 15 (Fifteen) Minutes As Needed for Low Blood Sugar (Blood Sugar Less Than 70, Patient Alert, Is Not NPO & Can Safely Swallow). - Oral    donepezil (ARICEPT) tablet 10 mg 10 mg Nightly 10/18/2017     Sig - Route: Take 2 tablets by mouth Every Night. - Oral    ertapenem (INVanz) 1 g/100 mL 0.9% NS VTB (mbp) 1 g Every 24 Hours 10/21/2017     Sig - Route: Infuse 100 mL into a venous catheter Daily. - Intravenous    escitalopram (LEXAPRO) tablet 10 mg 10 mg Nightly 10/18/2017     Sig - Route: Take 1 tablet by mouth Every Night. - Oral    glucagon (GLUCAGEN) injection 1 mg 1 mg Every 15 Minutes PRN 10/17/2017     Sig - Route: Inject 1 mg under the skin Every 15 (Fifteen) Minutes As Needed (Blood Glucose Less Than 70 - Patient Without IV Access - Unresponsive, NPO or Unable To Safely Swallow). - Subcutaneous    heparin (porcine) 5000 UNIT/ML injection 5,000 Units 5,000 Units Every 12 Hours Scheduled 10/17/2017     Sig - Route: Inject 1 mL under the skin Every 12 (Twelve) Hours. - Subcutaneous    insulin aspart (novoLOG) injection 0-7 Units 0-7 Units 4 Times Daily Before Meals & Nightly 10/18/2017     Sig - Route: Inject 0-7 Units under the skin 4 (Four) Times a Day Before Meals & at Bedtime. - Subcutaneous    levothyroxine (SYNTHROID, LEVOTHROID) tablet 25 mcg 25 mcg Nightly 10/18/2017     Sig - Route: Take 1 tablet by mouth Every Night. - Oral    Cosign for Ordering: Accepted by Yusef Rivera DO on 10/21/2017  5:40 PM    Magnesium Sulfate 2 gram Bolus, followed by 8 gram infusion (total Mg dose 10 grams)- Mg less than or equal to 1mg/dL 2 g As Needed 10/20/2017     Sig - Route: Infuse 50 mL into a venous catheter As Needed (Mg less than or equal to 1mg/dL). -  "Intravenous    Linked Group 1:  \"Or\" Linked Group Details        magnesium sulfate 4 gram infusion- Mg 1.6-1.9 mg/dL 4 g As Needed 10/20/2017     Sig - Route: Infuse 100 mL into a venous catheter As Needed (Mg 1.6-1.9 mg/dL). - Intravenous    Linked Group 1:  \"Or\" Linked Group Details        Magnesium Sulfate 6 gram Infusion (2 gm x 3) -Mg 1.1 -1.5 mg/dL 2 g As Needed 10/20/2017     Sig - Route: Infuse 50 mL into a venous catheter As Needed (Mg 1.1 -1.5 mg/dL). - Intravenous    Linked Group 1:  \"Or\" Linked Group Details        megestrol (MEGACE) 40 MG/ML suspension 400 mg 400 mg 2 Times Daily 10/20/2017     Sig - Route: Take 10 mL by mouth 2 (Two) Times a Day. - Oral    memantine (NAMENDA) tablet 10 mg 10 mg Every 12 Hours Scheduled 10/18/2017     Sig - Route: Take 1 tablet by mouth Every 12 (Twelve) Hours. - Oral    ondansetron (ZOFRAN) tablet 4 mg 4 mg Every 6 Hours PRN 10/17/2017     Sig - Route: Take 1 tablet by mouth Every 6 (Six) Hours As Needed for Nausea or Vomiting. - Oral    potassium chloride (KLOR-CON) packet 40 mEq 40 mEq As Needed 10/20/2017     Sig - Route: Take 40 mEq by mouth As Needed (potassium replacement, see admin instructions). - Oral    Cosign for Ordering: Accepted by Yusef Rivera DO on 10/21/2017  5:40 PM    Linked Group 2:  \"Or\" Linked Group Details        potassium chloride (MICRO-K) CR capsule 40 mEq 40 mEq As Needed 10/20/2017     Sig - Route: Take 4 capsules by mouth As Needed (potassium replacement.  see admin instructions). - Oral    Cosign for Ordering: Accepted by Yusef Rivera DO on 10/21/2017  5:40 PM    Linked Group 2:  \"Or\" Linked Group Details        potassium chloride 10 mEq in 100 mL IVPB 10 mEq Every 1 Hour PRN 10/20/2017     Sig - Route: Infuse 100 mL into a venous catheter Every 1 (One) Hour As Needed (potassium protocol PERIPHERAL - see admin instructions). - Intravenous    Cosign for Ordering: Accepted by Yusef Rivera DO on 10/21/2017  5:40 " "PM    Linked Group 2:  \"Or\" Linked Group Details        Risaquad-2 capsule 1 capsule 1 capsule Daily 10/18/2017     Sig - Route: Take 1 capsule by mouth Daily. - Oral    sodium chloride 0.9 % flush 1-10 mL 1-10 mL As Needed 10/17/2017     Sig - Route: Infuse 1-10 mL into a venous catheter As Needed for Line Care. - Intravenous    sodium chloride 0.9 % flush 10 mL 10 mL As Needed 10/17/2017     Sig - Route: Infuse 10 mL into a venous catheter As Needed for Line Care. - Intravenous    Linked Group 3:  \"And\" Linked Group Details        Vortioxetine HBr tablet 10 mg 10 mg Nightly 10/18/2017     Sig - Route: Take 10 mg by mouth Every Night. - Oral    Non-formulary Exception Code: Patient supplied medication    sodium chloride 0.9 % infusion (Discontinued) 60 mL/hr Continuous 10/17/2017 10/21/2017    Sig - Route: Infuse 60 mL/hr into a venous catheter Continuous. - Intravenous            Cultures:    Blood Culture   Date Value Ref Range Status   10/17/2017 No growth at 4 days  Preliminary   10/17/2017 No growth at 4 days  Preliminary     Urine Culture   Date Value Ref Range Status   10/17/2017 >100,000 CFU/mL Escherichia coli (A)  Final     Comment:     This organism is a suspected ESBL .  Strains of Klebsiella spp. E coli, and a few other genera of the Enterobacteriaceae family that produce extended spectrum beta lactamase (ESBL) may be clinically resistant to therapy with penicillins, ceph  alosporins, or aztreonam, despite apparent in vitro susceptibility to some of these agents.           Assessment/Plan     ASSESSMENT:    #1 UTI     PLAN:    Patient seems to be comfortable this morning with a normal white count and stable CRP level.  No fever reported and she seems to be stable from infectious disease standpoint on Invanz.     Urine culture from 12/17/17 finalized as greater than 100,000 colonies of Escherichia coli suspected to be an ESBL susceptible to ertapenem.     In the setting of positive urinary " culture for Escherichia coli and suspected ESBL organism, recommend to continue Invanz 1 g every 24 hours as culture shows ertapenem susceptibility.  Blood cultures from 10/17/17 so far show no growth.  We'll continue to follow closely.    Patient's findings and recommendations were discussed with patient and nursing staff    Code Status: Full Code    Sully Hoang PA-C  10/22/17  10:36 AM

## 2017-10-22 NOTE — PLAN OF CARE
Problem: Patient Care Overview (Adult)  Goal: Plan of Care Review  Outcome: Ongoing (interventions implemented as appropriate)    10/20/17 1823 10/22/17 0757   Patient Care Overview   Progress improving --    Coping/Psychosocial Response Interventions   Plan Of Care Reviewed With --  patient       Goal: Adult Individualization and Mutuality  Outcome: Ongoing (interventions implemented as appropriate)  Goal: Discharge Needs Assessment  Outcome: Ongoing (interventions implemented as appropriate)    Problem: Fall Risk (Adult)  Goal: Identify Related Risk Factors and Signs and Symptoms  Outcome: Ongoing (interventions implemented as appropriate)    10/17/17 2243   Fall Risk   Fall Risk: Related Risk Factors age-related changes;environment unfamiliar;fear of falling;fatigue/slow reaction;gait/mobility problems   Fall Risk: Signs and Symptoms presence of risk factors         10/17/17 2243   Fall Risk   Fall Risk: Related Risk Factors age-related changes;environment unfamiliar;fear of falling;fatigue/slow reaction;gait/mobility problems   Fall Risk: Signs and Symptoms presence of risk factors       Goal: Absence of Falls  Outcome: Ongoing (interventions implemented as appropriate)    10/21/17 1205   Fall Risk (Adult)   Absence of Falls making progress toward outcome         Problem: Infection, Risk/Actual (Adult)  Goal: Identify Related Risk Factors and Signs and Symptoms  Outcome: Ongoing (interventions implemented as appropriate)    10/17/17 2243   Infection, Risk/Actual   Infection, Risk/Actual: Related Risk Factors age extremes   Signs and Symptoms (Infection, Risk/Actual) weakness       Goal: Infection Prevention/Resolution  Outcome: Ongoing (interventions implemented as appropriate)    10/21/17 1205   Infection, Risk/Actual (Adult)   Infection Prevention/Resolution making progress toward outcome         Problem: Pressure Ulcer Risk (Calin Scale) (Adult,Obstetrics,Pediatric)  Goal: Identify Related Risk Factors and  Signs and Symptoms  Outcome: Ongoing (interventions implemented as appropriate)    10/17/17 2243   Pressure Ulcer Risk (Calin Scale)   Related Risk Factors (Pressure Ulcer Risk (Calin Scale)) age extremes;medical devices;mobility impaired;nutritional deficiencies         10/17/17 2243   Pressure Ulcer Risk (Calin Scale)   Related Risk Factors (Pressure Ulcer Risk (Calin Scale)) age extremes;medical devices;mobility impaired;nutritional deficiencies       Goal: Skin Integrity  Outcome: Ongoing (interventions implemented as appropriate)    10/22/17 0807   Pressure Ulcer Risk (Calin Scale) (Adult,Obstetrics,Pediatric)   Skin Integrity making progress toward outcome

## 2017-10-23 VITALS
OXYGEN SATURATION: 96 % | DIASTOLIC BLOOD PRESSURE: 86 MMHG | SYSTOLIC BLOOD PRESSURE: 145 MMHG | HEIGHT: 66 IN | HEART RATE: 86 BPM | BODY MASS INDEX: 30.07 KG/M2 | WEIGHT: 187.1 LBS | TEMPERATURE: 98.4 F | RESPIRATION RATE: 18 BRPM

## 2017-10-23 LAB
ALBUMIN SERPL-MCNC: 3.1 G/DL (ref 3.4–4.8)
ALBUMIN/GLOB SERPL: 1.2 G/DL (ref 1.5–2.5)
ALP SERPL-CCNC: 79 U/L (ref 35–104)
ALT SERPL W P-5'-P-CCNC: 24 U/L (ref 10–36)
ANION GAP SERPL CALCULATED.3IONS-SCNC: 5.7 MMOL/L (ref 3.6–11.2)
AST SERPL-CCNC: 35 U/L (ref 10–30)
BASOPHILS # BLD AUTO: 0.03 10*3/MM3 (ref 0–0.3)
BASOPHILS NFR BLD AUTO: 0.4 % (ref 0–2)
BILIRUB SERPL-MCNC: 0.5 MG/DL (ref 0.2–1.8)
BUN BLD-MCNC: 18 MG/DL (ref 7–21)
BUN/CREAT SERPL: 22.8 (ref 7–25)
CALCIUM SPEC-SCNC: 9.2 MG/DL (ref 7.7–10)
CHLORIDE SERPL-SCNC: 110 MMOL/L (ref 99–112)
CO2 SERPL-SCNC: 27.3 MMOL/L (ref 24.3–31.9)
CREAT BLD-MCNC: 0.79 MG/DL (ref 0.43–1.29)
CRP SERPL-MCNC: 1.41 MG/DL (ref 0–0.99)
DEPRECATED RDW RBC AUTO: 46.8 FL (ref 37–54)
EOSINOPHIL # BLD AUTO: 0.32 10*3/MM3 (ref 0–0.7)
EOSINOPHIL NFR BLD AUTO: 4.7 % (ref 0–7)
ERYTHROCYTE [DISTWIDTH] IN BLOOD BY AUTOMATED COUNT: 14.3 % (ref 11.5–14.5)
GFR SERPL CREATININE-BSD FRML MDRD: 71 ML/MIN/1.73
GLOBULIN UR ELPH-MCNC: 2.6 GM/DL
GLUCOSE BLD-MCNC: 127 MG/DL (ref 70–110)
GLUCOSE BLDC GLUCOMTR-MCNC: 88 MG/DL (ref 70–130)
GLUCOSE BLDC GLUCOMTR-MCNC: 93 MG/DL (ref 70–130)
GLUCOSE BLDC GLUCOMTR-MCNC: 97 MG/DL (ref 70–130)
HCT VFR BLD AUTO: 35.9 % (ref 37–47)
HGB BLD-MCNC: 11.4 G/DL (ref 12–16)
IMM GRANULOCYTES # BLD: 0.02 10*3/MM3 (ref 0–0.03)
IMM GRANULOCYTES NFR BLD: 0.3 % (ref 0–0.5)
LYMPHOCYTES # BLD AUTO: 2.43 10*3/MM3 (ref 1–3)
LYMPHOCYTES NFR BLD AUTO: 35.5 % (ref 16–46)
MAGNESIUM SERPL-MCNC: 1.9 MG/DL (ref 1.7–2.6)
MCH RBC QN AUTO: 30.1 PG (ref 27–33)
MCHC RBC AUTO-ENTMCNC: 31.8 G/DL (ref 33–37)
MCV RBC AUTO: 94.7 FL (ref 80–94)
MONOCYTES # BLD AUTO: 0.38 10*3/MM3 (ref 0.1–0.9)
MONOCYTES NFR BLD AUTO: 5.6 % (ref 0–12)
NEUTROPHILS # BLD AUTO: 3.66 10*3/MM3 (ref 1.4–6.5)
NEUTROPHILS NFR BLD AUTO: 53.5 % (ref 40–75)
OSMOLALITY SERPL CALC.SUM OF ELEC: 288.5 MOSM/KG (ref 273–305)
PHOSPHATE SERPL-MCNC: 2.8 MG/DL (ref 2.7–4.5)
PLATELET # BLD AUTO: 192 10*3/MM3 (ref 130–400)
PMV BLD AUTO: 11.4 FL (ref 6–10)
POTASSIUM BLD-SCNC: 3.8 MMOL/L (ref 3.5–5.3)
PROT SERPL-MCNC: 5.7 G/DL (ref 6–8)
RBC # BLD AUTO: 3.79 10*6/MM3 (ref 4.2–5.4)
SODIUM BLD-SCNC: 143 MMOL/L (ref 135–153)
WBC NRBC COR # BLD: 6.84 10*3/MM3 (ref 4.5–12.5)

## 2017-10-23 PROCEDURE — 83735 ASSAY OF MAGNESIUM: CPT | Performed by: INTERNAL MEDICINE

## 2017-10-23 PROCEDURE — 97530 THERAPEUTIC ACTIVITIES: CPT

## 2017-10-23 PROCEDURE — 84100 ASSAY OF PHOSPHORUS: CPT | Performed by: INTERNAL MEDICINE

## 2017-10-23 PROCEDURE — 82962 GLUCOSE BLOOD TEST: CPT

## 2017-10-23 PROCEDURE — 86140 C-REACTIVE PROTEIN: CPT | Performed by: INTERNAL MEDICINE

## 2017-10-23 PROCEDURE — 85025 COMPLETE CBC W/AUTO DIFF WBC: CPT | Performed by: INTERNAL MEDICINE

## 2017-10-23 PROCEDURE — 25010000002 HEPARIN (PORCINE) PER 1000 UNITS: Performed by: INTERNAL MEDICINE

## 2017-10-23 PROCEDURE — 80053 COMPREHEN METABOLIC PANEL: CPT | Performed by: INTERNAL MEDICINE

## 2017-10-23 PROCEDURE — 99239 HOSP IP/OBS DSCHRG MGMT >30: CPT | Performed by: HOSPITALIST

## 2017-10-23 RX ORDER — MEGESTROL ACETATE 40 MG/ML
400 SUSPENSION ORAL 2 TIMES DAILY
Qty: 600 ML | Refills: 0 | Status: SHIPPED | OUTPATIENT
Start: 2017-10-23 | End: 2018-07-31 | Stop reason: ALTCHOICE

## 2017-10-23 RX ORDER — CLONAZEPAM 0.5 MG/1
0.5 TABLET ORAL 2 TIMES DAILY PRN
Qty: 10 TABLET | Refills: 0 | Status: SHIPPED | OUTPATIENT
Start: 2017-10-23 | End: 2018-07-31 | Stop reason: ALTCHOICE

## 2017-10-23 RX ADMIN — HEPARIN SODIUM 5000 UNITS: 5000 INJECTION, SOLUTION INTRAVENOUS; SUBCUTANEOUS at 08:34

## 2017-10-23 RX ADMIN — ERTAPENEM SODIUM 1 G: 1 INJECTION, POWDER, LYOPHILIZED, FOR SOLUTION INTRAMUSCULAR; INTRAVENOUS at 12:58

## 2017-10-23 RX ADMIN — Medication 1 CAPSULE: at 08:34

## 2017-10-23 RX ADMIN — MEMANTINE HYDROCHLORIDE 10 MG: 10 TABLET ORAL at 08:34

## 2017-10-23 RX ADMIN — MEGESTROL ACETATE 400 MG: 40 SUSPENSION ORAL at 17:59

## 2017-10-23 RX ADMIN — MEGESTROL ACETATE 400 MG: 40 SUSPENSION ORAL at 08:34

## 2017-10-23 RX ADMIN — ACETAMINOPHEN 650 MG: 325 TABLET ORAL at 04:00

## 2017-10-23 NOTE — PROGRESS NOTES
"  I have personally seen and examined the patient today and discussed overnight interval progress and pertinent issues with nursing staff.    Subjective:    Patient was resting comfortably this morning the CRP level at 1.41 and normal white count.  No issues reported by the patient's nurse.    History taken from: patient chart RN      Vital Signs    /78 (BP Location: Left arm, Patient Position: Lying)  Pulse 88  Temp 98.4 °F (36.9 °C) (Oral)   Resp 20  Ht 66\" (167.6 cm)  Wt 187 lb 1.6 oz (84.9 kg)  SpO2 98%  BMI 30.2 kg/m2    Temp:  [97.4 °F (36.3 °C)-98.4 °F (36.9 °C)] 98.4 °F (36.9 °C)      Intake/Output Summary (Last 24 hours) at 10/23/17 1120  Last data filed at 10/23/17 0300   Gross per 24 hour   Intake              360 ml   Output                0 ml   Net              360 ml     Intake & Output (last 3 days)       10/20 0701 - 10/21 0700 10/21 0701 - 10/22 0700 10/22 0701 - 10/23 0700 10/23 0701 - 10/24 0700    P.O. 480 720 360     Total Intake(mL/kg) 480 (5.8) 720 (8.7) 360 (4.2)     Urine (mL/kg/hr)        Stool        Total Output            Net +480 +720 +360              Unmeasured Urine Occurrence 10 x 8 x 8 x     Unmeasured Stool Occurrence 5 x 4 x 1 x           Physical exam:        General Appearance:     Confused in no acute distress, very sedated   Head:    Normocephalic, without obvious abnormality, atraumatic   Eyes:            Lids and lashes normal, conjunctivae and sclerae normal, no   icterus, no pallor, corneas clear, PERRLA   Ears:    Ears appear intact with no abnormalities noted   Throat:   No oral lesions, no thrush, oral mucosa moist   Neck:   No adenopathy, supple, trachea midline, no thyromegaly, no   carotid bruit, no JVD   Back:     No tenderness to percussion or palpation, range of motion   normal   Lungs:    Decreased in the bases     Heart:    Regular rhythm and normal rate, normal S1 and S2, no            murmur, no gallop, no rub, no click   Chest Wall:    No " abnormalities observed   Abdomen:     Normal bowel sounds, no masses, no organomegaly, soft        non-tender, non-distended, no guarding, no rebound                tenderness   Rectal:     Deferred   Extremities:   Moves all extremities well, no edema, no cyanosis, no             redness   Pulses:   Pulses palpable and equal bilaterally   Skin:   No bleeding, bruising or rash   Lymph nodes:   No palpable adenopathy   Neurologic:   Confused, very sedated         Results:      Results from last 7 days  Lab Units 10/23/17  0146 10/22/17  0520 10/21/17  0055 10/20/17  0055 10/19/17  0047 10/18/17  0102 10/17/17  1818   WBC 10*3/mm3 6.84 7.35 9.37 9.48 9.08 7.85 12.03     Lab Results   Component Value Date    NEUTROABS 3.66 10/23/2017         Results from last 7 days  Lab Units 10/23/17  0146   CREATININE mg/dL 0.79         Results from last 7 days  Lab Units 10/23/17  0146 10/22/17  0520 10/21/17  0055   CRP mg/dL 1.41* 2.49* 2.22*         Results Review:    I have personally reviewed laboratory data, culture results, radiology studies and antimicrobial therapy.    Hospital Medications (active)       Dose Frequency Start End    acetaminophen (TYLENOL) tablet 650 mg 650 mg Every 4 Hours PRN 10/17/2017     Sig - Route: Take 2 tablets by mouth Every 4 (Four) Hours As Needed for Mild Pain  or Headache. - Oral    atorvastatin (LIPITOR) tablet 40 mg 40 mg Nightly 10/18/2017     Sig - Route: Take 1 tablet by mouth Every Night. - Oral    bisacodyl (DULCOLAX) suppository 10 mg 10 mg Daily PRN 10/17/2017     Sig - Route: Insert 1 suppository into the rectum Daily As Needed for Constipation. - Rectal    clonazePAM (KlonoPIN) tablet 0.5 mg 0.5 mg 2 Times Daily PRN 10/18/2017     Sig - Route: Take 1 tablet by mouth 2 (Two) Times a Day As Needed for Anxiety. - Oral    dextrose (D50W) solution 25 g 25 g Every 15 Minutes PRN 10/17/2017     Sig - Route: Infuse 50 mL into a venous catheter Every 15 (Fifteen) Minutes As Needed for Low  Blood Sugar (Blood Sugar Less Than 70, Patient Has IV Access - Unresponsive, NPO or Unable To Safely Swallow). - Intravenous    dextrose (GLUTOSE) oral gel 15 g 15 g Every 15 Minutes PRN 10/17/2017     Sig - Route: Take 15 g by mouth Every 15 (Fifteen) Minutes As Needed for Low Blood Sugar (Blood Sugar Less Than 70, Patient Alert, Is Not NPO & Can Safely Swallow). - Oral    donepezil (ARICEPT) tablet 10 mg 10 mg Nightly 10/18/2017     Sig - Route: Take 2 tablets by mouth Every Night. - Oral    ertapenem (INVanz) 1 g/100 mL 0.9% NS VTB (mbp) 1 g Every 24 Hours 10/21/2017     Sig - Route: Infuse 100 mL into a venous catheter Daily. - Intravenous    escitalopram (LEXAPRO) tablet 10 mg 10 mg Nightly 10/18/2017     Sig - Route: Take 1 tablet by mouth Every Night. - Oral    glucagon (GLUCAGEN) injection 1 mg 1 mg Every 15 Minutes PRN 10/17/2017     Sig - Route: Inject 1 mg under the skin Every 15 (Fifteen) Minutes As Needed (Blood Glucose Less Than 70 - Patient Without IV Access - Unresponsive, NPO or Unable To Safely Swallow). - Subcutaneous    heparin (porcine) 5000 UNIT/ML injection 5,000 Units 5,000 Units Every 12 Hours Scheduled 10/17/2017     Sig - Route: Inject 1 mL under the skin Every 12 (Twelve) Hours. - Subcutaneous    insulin aspart (novoLOG) injection 0-7 Units 0-7 Units 4 Times Daily Before Meals & Nightly 10/18/2017     Sig - Route: Inject 0-7 Units under the skin 4 (Four) Times a Day Before Meals & at Bedtime. - Subcutaneous    levothyroxine (SYNTHROID, LEVOTHROID) tablet 25 mcg 25 mcg Nightly 10/18/2017     Sig - Route: Take 1 tablet by mouth Every Night. - Oral    Cosign for Ordering: Accepted by Yusef Rivera DO on 10/21/2017  5:40 PM    Magnesium Sulfate 2 gram Bolus, followed by 8 gram infusion (total Mg dose 10 grams)- Mg less than or equal to 1mg/dL 2 g As Needed 10/20/2017     Sig - Route: Infuse 50 mL into a venous catheter As Needed (Mg less than or equal to 1mg/dL). - Intravenous     "Linked Group 1:  \"Or\" Linked Group Details        magnesium sulfate 4 gram infusion- Mg 1.6-1.9 mg/dL 4 g As Needed 10/20/2017     Sig - Route: Infuse 100 mL into a venous catheter As Needed (Mg 1.6-1.9 mg/dL). - Intravenous    Linked Group 1:  \"Or\" Linked Group Details        Magnesium Sulfate 6 gram Infusion (2 gm x 3) -Mg 1.1 -1.5 mg/dL 2 g As Needed 10/20/2017     Sig - Route: Infuse 50 mL into a venous catheter As Needed (Mg 1.1 -1.5 mg/dL). - Intravenous    Linked Group 1:  \"Or\" Linked Group Details        megestrol (MEGACE) 40 MG/ML suspension 400 mg 400 mg 2 Times Daily 10/20/2017     Sig - Route: Take 10 mL by mouth 2 (Two) Times a Day. - Oral    memantine (NAMENDA) tablet 10 mg 10 mg Every 12 Hours Scheduled 10/18/2017     Sig - Route: Take 1 tablet by mouth Every 12 (Twelve) Hours. - Oral    ondansetron (ZOFRAN) tablet 4 mg 4 mg Every 6 Hours PRN 10/17/2017     Sig - Route: Take 1 tablet by mouth Every 6 (Six) Hours As Needed for Nausea or Vomiting. - Oral    potassium chloride (KLOR-CON) packet 40 mEq 40 mEq As Needed 10/20/2017     Sig - Route: Take 40 mEq by mouth As Needed (potassium replacement, see admin instructions). - Oral    Cosign for Ordering: Accepted by Yusef Rivera DO on 10/21/2017  5:40 PM    Linked Group 2:  \"Or\" Linked Group Details        potassium chloride (MICRO-K) CR capsule 40 mEq 40 mEq As Needed 10/20/2017     Sig - Route: Take 4 capsules by mouth As Needed (potassium replacement.  see admin instructions). - Oral    Cosign for Ordering: Accepted by Yusef Rivera DO on 10/21/2017  5:40 PM    Linked Group 2:  \"Or\" Linked Group Details        potassium chloride 10 mEq in 100 mL IVPB 10 mEq Every 1 Hour PRN 10/20/2017     Sig - Route: Infuse 100 mL into a venous catheter Every 1 (One) Hour As Needed (potassium protocol PERIPHERAL - see admin instructions). - Intravenous    Cosign for Ordering: Accepted by Yusef Rivera DO on 10/21/2017  5:40 PM    Linked " "Group 2:  \"Or\" Linked Group Details        Risaquad-2 capsule 1 capsule 1 capsule Daily 10/18/2017     Sig - Route: Take 1 capsule by mouth Daily. - Oral    sodium chloride 0.9 % flush 1-10 mL 1-10 mL As Needed 10/17/2017     Sig - Route: Infuse 1-10 mL into a venous catheter As Needed for Line Care. - Intravenous    sodium chloride 0.9 % flush 10 mL 10 mL As Needed 10/17/2017     Sig - Route: Infuse 10 mL into a venous catheter As Needed for Line Care. - Intravenous    Linked Group 3:  \"And\" Linked Group Details        Vortioxetine HBr tablet 10 mg 10 mg Nightly 10/18/2017     Sig - Route: Take 10 mg by mouth Every Night. - Oral    Non-formulary Exception Code: Patient supplied medication    sodium chloride 0.9 % infusion (Discontinued) 60 mL/hr Continuous 10/17/2017 10/21/2017    Sig - Route: Infuse 60 mL/hr into a venous catheter Continuous. - Intravenous            Cultures:    Blood Culture   Date Value Ref Range Status   10/17/2017 No growth at 4 days  Preliminary   10/17/2017 No growth at 4 days  Preliminary     Urine Culture   Date Value Ref Range Status   10/17/2017 >100,000 CFU/mL Escherichia coli (A)  Final     Comment:     This organism is a suspected ESBL .  Strains of Klebsiella spp. E coli, and a few other genera of the Enterobacteriaceae family that produce extended spectrum beta lactamase (ESBL) may be clinically resistant to therapy with penicillins, ceph  alosporins, or aztreonam, despite apparent in vitro susceptibility to some of these agents.           Assessment/Plan     ASSESSMENT:    #1 UTI     PLAN:    Patient was resting comfortably this morning the CRP level at 1.41 and normal white count.  No issues reported by the patient's nurse.  Would recommend to continue Invanz 1 g IV every 24 hours.     Urine culture from 12/17/17 finalized as greater than 100,000 colonies of Escherichia coli suspected to be an ESBL susceptible to ertapenem.    Patient's findings and recommendations were " discussed with patient and nursing staff    Code Status: Full Code    Sully Hoang PA-C  10/23/17  11:20 AM

## 2017-10-23 NOTE — DISCHARGE PLACEMENT REQUEST
"Marie Beasley (77 y.o. Female)     Date of Birth Social Security Number Address Home Phone MRN    1940  [address] [place] [address] [phone] [id number]    [employer] Marital Status          None        Admission Date Admission Type Admitting Provider Attending Provider Department, Room/Bed    10/17/17 Emergency Mani Heredia MD Aliu, Peter I, MD [address], 3304/2S    Discharge Date Discharge Disposition Discharge Destination         Skilled Nursing Facility (DC - External)             Attending Provider: Mani Heredia MD     Allergies:  No Known Allergies    Isolation:  Contact   Infection:  ESBL E coli (10/21/17), MDRO (10/21/17)   Code Status:  FULL    Ht:  66\" (167.6 cm)   Wt:  187 lb 1.6 oz (84.9 kg)    Admission Cmt:  None   Principal Problem:  UTI (urinary tract infection) [N39.0]                 Active Insurance as of 10/17/2017     Primary Coverage     Payor Plan Insurance Group Employer/Plan Group    MEDICARE MEDICARE A & B      Payor Plan Address Payor Plan Phone Number Effective From Effective To    [address] [phone] 5/1/2005     [place] [address]       Subscriber Name Subscriber Birth Date Member ID       MARIE BEASLEY 1940 [id number]           Secondary Coverage     Payor Plan Insurance Group Employer/Plan Group    [facility] [employer] [facility] [employer] [facility] PPO [id number]     Payor Plan Address Payor Plan Phone Number Effective From Effective To    [address] [phone] 7/8/2016     [place] [address]       Subscriber Name Subscriber Birth Date Member ID       MARIE BEASLEY 1940 [id number]                 Emergency Contacts      (Rel.) Home Phone Work Phone Mobile Phone    Sophia Craft (Guardian) [phone] -- [phone]    Erika Beasley (Daughter) [phone] -- --            Insurance Information                MEDICARE/MEDICARE A & B Phone: [phone]    Subscriber: Marie Beasley Subscriber#: " [id number]    Group#:  Precert#:         [employer] P[employer] Phone: [phone]    Subscriber: Luz Beasley Subscriber#: [id number]    Group#: [id number] Precert#:              History & Physical      Mani Heredia MD at 10/18/2017 12:27 AM              [facility]    Patient Identification:  Name:  Luz Beasley  Age:  77 y.o.  Sex:  female  :  1940  MRN:  [id number]   Visit Number:  [id number]  Primary Care Physician:  Musa Mondragon MD     Chief complaint: Progressive deconditioning.    History of presenting illness:  77 y.o. female who lives at home with family and has dementia.  Family brought her to the emergency room as a result of worsening physical deconditioning, refusal to eat, dehydration and difficulty with continued care of patient at home.  Her daughter who was by patient's bedside reported that patient fell about 1 month ago while attempting to go to the bathroom.  Since that incident, patient has been very scared to ambulate.  Ultimately urinates and defecates on herself rather than attempt to go to the bathroom.  There is no report of fever/chills.  Patient denies any complaint. Patient's daughter reports that patient has not been eating or drinking except occasional ensure food supplement.  It has become very difficult for family to continue to care for patient at home.  The family has requested possible rehabilitation or nursing home placement.  Meanwhile, urine analysis was positive for urinary tract infection and patient is currently on IV antibiotics.  She appears dehydrated clinically.    ---------------------------------------------------------------------------------------------------------------------   Review of Systems   Unable to perform ROS: Dementia   Constitutional: Positive for activity change, appetite change and fatigue. Negative for chills, diaphoresis and fever.   HENT: Negative for  sneezing.       ---------------------------------------------------------------------------------------------------------------------   Past Medical History:   Diagnosis Date   • Dementia    • Depression    • Diabetes mellitus    • Hyperlipidemia    • Hypertension    • Obesity    • Senile dementia    • Vitamin D deficiency disease      Past Surgical History:   Procedure Laterality Date   • CARDIAC CATHETERIZATION  05/05/2006   • CARDIOVASCULAR STRESS TEST  05/05/2006   • COLONOSCOPY  03/21/2008   • HYSTERECTOMY     • THYROID SURGERY       Family History   Problem Relation Age of Onset   • Family history unknown: Yes     Social History     Social History   • Marital status:      Spouse name: N/A   • Number of children: N/A   • Years of education: N/A     Social History Main Topics   • Smoking status: Never Smoker   • Smokeless tobacco: Never Used   • Alcohol use No   • Drug use: No   • Sexual activity: Defer     Other Topics Concern   • None     Social History Narrative     ---------------------------------------------------------------------------------------------------------------------   Allergies:  Review of patient's allergies indicates no known allergies.  ---------------------------------------------------------------------------------------------------------------------   Prior to Admission Medications     Prescriptions Last Dose Informant Patient Reported? Taking?    atorvastatin (LIPITOR) 80 MG tablet 10/16/2017 Family Member No Yes    Take 0.5 tablets by mouth Daily.    Patient taking differently:  Take 80 mg by mouth Every Night.    clonazePAM (KlonoPIN) 0.5 MG tablet 10/16/2017 Family Member No Yes    Take 1 tablet by mouth 2 (Two) Times a Day As Needed for Anxiety.    escitalopram (LEXAPRO) 10 MG tablet 10/16/2017 Family Member Yes Yes    Take 10 mg by mouth Every Night.    levothyroxine (LEVOTHROID) 25 MCG tablet 10/16/2017 Family Member No Yes    Take 1 tablet by mouth Daily.    Patient taking  differently:  Take 25 mcg by mouth Every Night.    Melatonin 3 MG tablet dispersible 10/16/2017 Family Member Yes Yes    Take 1 tablet by mouth Every Night.    metFORMIN (GLUCOPHAGE) 500 MG tablet 10/16/2017 Family Member No Yes    Take 0.5 tablets by mouth Daily With Breakfast.    Patient taking differently:  Take 250 mg by mouth Every Night.    NAMZARIC 28-10 MG capsule sustained-release 24 hr 10/16/2017 Family Member Yes Yes    Take 1 tablet by mouth Every Night.    topiramate (TOPAMAX) 25 MG tablet 10/16/2017 Family Member No Yes    Take 1 tablet by mouth 2 (Two) Times a Day.    Patient taking differently:  Take 25 mg by mouth Every Night. Prescribed bid but patient caregiver/family member says she only takes it once a day    Vortioxetine HBr 10 MG tablet 10/16/2017 Family Member No Yes    Take 10 mg by mouth Daily.    Patient taking differently:  Take 10 mg by mouth Every Night.        Hospital Scheduled Meds:    ceftriaxone 1 g Intravenous Q24H   heparin (porcine) 5,000 Units Subcutaneous Q12H   insulin aspart 0-7 Units Subcutaneous 4x Daily AC & at Bedtime       sodium chloride 100 mL/hr Last Rate: 100 mL/hr (10/17/17 6759)     ---------------------------------------------------------------------------------------------------------------------   Vital Signs:  Temp:  [98 °F (36.7 °C)-98.7 °F (37.1 °C)] 98 °F (36.7 °C)  Heart Rate:  [] 68  Resp:  [18-20] 18  BP: (133-163)/(65-88) 163/82  Last 3 weights    10/17/17  1758 10/17/17  2146   Weight: 190 lb (86.2 kg) 179 lb 8 oz (81.4 kg)     Body mass index is 28.97 kg/(m^2).  ---------------------------------------------------------------------------------------------------------------------   Physical Exam:  Constitutional:  Well-developed and well-nourished.  No respiratory distress.      HENT:  Head: Normocephalic and atraumatic.  Mouth:  Dry mucous membranes.    Eyes:  Conjunctivae and EOM are normal.  Pupils are equal, round, and reactive to light.  No  scleral icterus.  Neck:  Neck supple.  No JVD present.    Cardiovascular:  Normal rate, regular rhythm and normal heart sounds with no murmur.  Pulmonary/Chest:  No respiratory distress, no wheezes, no crackles, with normal breath sounds and good air movement.  Abdominal:  Soft.  Bowel sounds are normal.  No distension and no tenderness.   Musculoskeletal:  No edema, no tenderness, and no deformity.  No red or swollen joints anywhere.    Neurological:  Patient is awake and alert but disoriented.  No cranial nerve deficit.  No tongue deviation.  No facial droop.  No slurred speech.   Skin:  Skin is warm and dry.  No rash noted.  No pallor.   Psychiatric:  Normal mood and affect.  Behavior is normal.  Judgment and thought content normal.   Peripheral vascular:  No edema and strong pulses on all 4 extremities.  Genitourinary: Deferred.    ---------------------------------------------------------------------------------------------------------------------  EKG:    Telemetry:    I have personally looked at both the EKG and the telemetry strips.  ---------------------------------------------------------------------------------------------------------------------     Results from last 7 days  Lab Units 10/17/17  1818   CRP mg/dL 2.28*   LACTATE mmol/L 2.0   WBC 10*3/mm3 12.03   HEMOGLOBIN g/dL 14.5   HEMATOCRIT % 45.6   MCV fL 93.4   MCHC g/dL 31.8*   PLATELETS 10*3/mm3 188           Results from last 7 days  Lab Units 10/17/17  1818   SODIUM mmol/L 142   POTASSIUM mmol/L 3.5   CHLORIDE mmol/L 108   CO2 mmol/L 23.9*   BUN mg/dL 28*   CREATININE mg/dL 1.21   EGFR IF NONAFRICN AM mL/min/1.73 43*   CALCIUM mg/dL 10.5*   GLUCOSE mg/dL 153*   ALBUMIN g/dL 4.50   BILIRUBIN mg/dL 1.6   ALK PHOS U/L 113*   AST (SGOT) U/L 30   ALT (SGPT) U/L 23   Estimated Creatinine Clearance: 41.9 mL/min (by C-G formula based on Cr of 1.21).  No results found for: AMMONIA    Results from last 7 days  Lab Units 10/17/17  1818   TROPONIN I ng/mL  0.040         Lab Results   Component Value Date    HGBA1C 6.20 (H) 06/12/2017     Lab Results   Component Value Date    TSH 1.199 06/12/2017    FREET4 1.16 06/12/2017     No results found for: PREGTESTUR, PREGSERUM, HCG, HCGQUANT  Pain Management Panel     There is no flowsheet data to display.                        ---------------------------------------------------------------------------------------------------------------------  Imaging Results (last 7 days)     Procedure Component Value Units Date/Time    XR Chest 1 View [[id number]] Resulted:  10/17/17 1859     Updated:  10/17/17 1859          I have personally reviewed the radiology images and read the final radiology report.  ---------------------------------------------------------------------------------------------------------------------  Assessment and Plan:  1.  Physical deconditioning and self-care deficits.  Family is requesting possible rehabilitation or nursing home placement.,  Increasingly difficult for family to take care of patient at home.  We will consult social service for evaluation and recommendations.  2.  Dehydration.This is most likely secondary to poor oral intakes.  We will start gentle hydration.  3.  Urinary tract infection.  Patient is started on IV Rocephin and will follow up urine culture.  4.  Mild hypercalcemia.  Possibly secondary to dehydration.  We will check calcium level in the morning.  4.  Dementia.  Supportive care.  5.  Diabetes mellitus.  Continue finger stick glucose monitoring with sliding scale protocol.  Encourage patient to eat.  6.  DVT prophylaxis.  Bilateral SCDs.    Mani Heredia MD  10/18/17  12:27 AM     Electronically signed by Mani Heredia MD at 10/18/2017 12:53 AM        Hospital Medications (active)       Dose Frequency Start End    acetaminophen (TYLENOL) tablet 650 mg 650 mg Every 4 Hours PRN 10/17/2017     Sig - Route: Take 2 tablets by mouth Every 4 (Four) Hours As Needed for Mild Pain  or  Headache. - Oral    atorvastatin (LIPITOR) tablet 40 mg 40 mg Nightly 10/18/2017     Sig - Route: Take 1 tablet by mouth Every Night. - Oral    bisacodyl (DULCOLAX) suppository 10 mg 10 mg Daily PRN 10/17/2017     Sig - Route: Insert 1 suppository into the rectum Daily As Needed for Constipation. - Rectal    clonazePAM (KlonoPIN) tablet 0.5 mg 0.5 mg 2 Times Daily PRN 10/18/2017     Sig - Route: Take 1 tablet by mouth 2 (Two) Times a Day As Needed for Anxiety. - Oral    dextrose (D50W) solution 25 g 25 g Every 15 Minutes PRN 10/17/2017     Sig - Route: Infuse 50 mL into a venous catheter Every 15 (Fifteen) Minutes As Needed for Low Blood Sugar (Blood Sugar Less Than 70, Patient Has IV Access - Unresponsive, NPO or Unable To Safely Swallow). - Intravenous    dextrose (GLUTOSE) oral gel 15 g 15 g Every 15 Minutes PRN 10/17/2017     Sig - Route: Take 15 g by mouth Every 15 (Fifteen) Minutes As Needed for Low Blood Sugar (Blood Sugar Less Than 70, Patient Alert, Is Not NPO & Can Safely Swallow). - Oral    donepezil (ARICEPT) tablet 10 mg 10 mg Nightly 10/18/2017     Sig - Route: Take 2 tablets by mouth Every Night. - Oral    ertapenem (INVanz) 1 g/100 mL 0.9% NS VTB (mbp) 1 g Every 24 Hours 10/21/2017     Sig - Route: Infuse 100 mL into a venous catheter Daily. - Intravenous    escitalopram (LEXAPRO) tablet 10 mg 10 mg Nightly 10/18/2017     Sig - Route: Take 1 tablet by mouth Every Night. - Oral    glucagon (GLUCAGEN) injection 1 mg 1 mg Every 15 Minutes PRN 10/17/2017     Sig - Route: Inject 1 mg under the skin Every 15 (Fifteen) Minutes As Needed (Blood Glucose Less Than 70 - Patient Without IV Access - Unresponsive, NPO or Unable To Safely Swallow). - Subcutaneous    heparin (porcine) 5000 UNIT/ML injection 5,000 Units 5,000 Units Every 12 Hours Scheduled 10/17/2017     Sig - Route: Inject 1 mL under the skin Every 12 (Twelve) Hours. - Subcutaneous    insulin aspart (novoLOG) injection 0-7 Units 0-7 Units 4 Times  "Daily Before Meals & Nightly 10/18/2017     Sig - Route: Inject 0-7 Units under the skin 4 (Four) Times a Day Before Meals & at Bedtime. - Subcutaneous    levothyroxine (SYNTHROID, LEVOTHROID) tablet 25 mcg 25 mcg Nightly 10/18/2017     Sig - Route: Take 1 tablet by mouth Every Night. - Oral    Cosign for Ordering: Accepted by Yusef Rivera DO on 10/21/2017  5:40 PM    Magnesium Sulfate 2 gram Bolus, followed by 8 gram infusion (total Mg dose 10 grams)- Mg less than or equal to 1mg/dL 2 g As Needed 10/20/2017     Sig - Route: Infuse 50 mL into a venous catheter As Needed (Mg less than or equal to 1mg/dL). - Intravenous    Linked Group 1:  \"Or\" Linked Group Details        magnesium sulfate 4 gram infusion- Mg 1.6-1.9 mg/dL 4 g As Needed 10/20/2017     Sig - Route: Infuse 100 mL into a venous catheter As Needed (Mg 1.6-1.9 mg/dL). - Intravenous    Linked Group 1:  \"Or\" Linked Group Details        Magnesium Sulfate 6 gram Infusion (2 gm x 3) -Mg 1.1 -1.5 mg/dL 2 g As Needed 10/20/2017     Sig - Route: Infuse 50 mL into a venous catheter As Needed (Mg 1.1 -1.5 mg/dL). - Intravenous    Linked Group 1:  \"Or\" Linked Group Details        megestrol (MEGACE) 40 MG/ML suspension 400 mg 400 mg 2 Times Daily 10/20/2017     Sig - Route: Take 10 mL by mouth 2 (Two) Times a Day. - Oral    memantine (NAMENDA) tablet 10 mg 10 mg Every 12 Hours Scheduled 10/18/2017     Sig - Route: Take 1 tablet by mouth Every 12 (Twelve) Hours. - Oral    ondansetron (ZOFRAN) tablet 4 mg 4 mg Every 6 Hours PRN 10/17/2017     Sig - Route: Take 1 tablet by mouth Every 6 (Six) Hours As Needed for Nausea or Vomiting. - Oral    potassium chloride (KLOR-CON) packet 40 mEq 40 mEq As Needed 10/20/2017     Sig - Route: Take 40 mEq by mouth As Needed (potassium replacement, see admin instructions). - Oral    Cosign for Ordering: Accepted by Yusef Rivera DO on 10/21/2017  5:40 PM    Linked Group 2:  \"Or\" Linked Group Details        potassium " "chloride (MICRO-K) CR capsule 40 mEq 40 mEq As Needed 10/20/2017     Sig - Route: Take 4 capsules by mouth As Needed (potassium replacement.  see admin instructions). - Oral    Cosign for Ordering: Accepted by Yusef Rivera DO on 10/21/2017  5:40 PM    Linked Group 2:  \"Or\" Linked Group Details        potassium chloride 10 mEq in 100 mL IVPB 10 mEq Every 1 Hour PRN 10/20/2017     Sig - Route: Infuse 100 mL into a venous catheter Every 1 (One) Hour As Needed (potassium protocol PERIPHERAL - see admin instructions). - Intravenous    Cosign for Ordering: Accepted by Yusef Rivera DO on 10/21/2017  5:40 PM    Linked Group 2:  \"Or\" Linked Group Details        potassium phosphate 15 mmol in sodium chloride 0.9 % 100 mL infusion 15 mmol As Needed 10/22/2017     Sig - Route: Infuse 15 mmol into a venous catheter As Needed (Peripheral IV - Phosphorus 1.8 - 2.5). - Intravenous    Linked Group 3:  \"Or\" Linked Group Details        potassium phosphate 15 mmol in sodium chloride 0.9 % 100 mL infusion 15 mmol Once 10/22/2017 10/22/2017    Sig - Route: Infuse 15 mmol into a venous catheter 1 (One) Time. - Intravenous    potassium phosphate 30 mmol in sodium chloride 0.9 % 250 mL infusion 30 mmol As Needed 10/22/2017     Sig - Route: Infuse 30 mmol into a venous catheter As Needed (Peripheral IV - Phosphorus 1.3 - 1.7). - Intravenous    Linked Group 3:  \"Or\" Linked Group Details        potassium phosphate 45 mmol in sodium chloride 0.9 % 500 mL infusion 45 mmol As Needed 10/22/2017     Sig - Route: Infuse 45 mmol into a venous catheter As Needed (Peripheral IV - Phosphorus Less Than 1.3). - Intravenous    Linked Group 3:  \"Or\" Linked Group Details        Risaquad-2 capsule 1 capsule 1 capsule Daily 10/18/2017     Sig - Route: Take 1 capsule by mouth Daily. - Oral    sodium chloride 0.9 % flush 1-10 mL 1-10 mL As Needed 10/17/2017     Sig - Route: Infuse 1-10 mL into a venous catheter As Needed for Line Care. - " "Intravenous    sodium chloride 0.9 % flush 10 mL 10 mL As Needed 10/17/2017     Sig - Route: Infuse 10 mL into a venous catheter As Needed for Line Care. - Intravenous    Linked Group 4:  \"And\" Linked Group Details        sodium phosphates 15 mmol in sodium chloride 0.9 % 250 mL IVPB 15 mmol As Needed 10/22/2017     Sig - Route: Infuse 15 mmol into a venous catheter As Needed (Peripheral IV - Phosphorus 1.8 - 2.5 & Potassium Greater Than 4). - Intravenous    Linked Group 3:  \"Or\" Linked Group Details        sodium phosphates 30 mmol in sodium chloride 0.9 % 250 mL IVPB 30 mmol As Needed 10/22/2017     Sig - Route: Infuse 30 mmol into a venous catheter As Needed (Peripheral IV - Phosphorus 1.3-1.7 & Potassium Greater Than 4). - Intravenous    Linked Group 3:  \"Or\" Linked Group Details        sodium phosphates 45 mmol in sodium chloride 0.9 % 500 mL IVPB 45 mmol As Needed 10/22/2017     Sig - Route: Infuse 45 mmol into a venous catheter As Needed (Peripheral IV - Phosphorus Less Than 1.3 & Potassium Greater Than 4). - Intravenous    Linked Group 3:  \"Or\" Linked Group Details        Vortioxetine HBr tablet 10 mg 10 mg Nightly 10/18/2017     Sig - Route: Take 10 mg by mouth Every Night. - Oral    Non-formulary Exception Code: Patient supplied medication            Lab Results (last 24 hours)     Procedure Component Value Units Date/Time    POC Glucose Fingerstick [[id number]]  (Normal) Collected:  10/22/17 1637    Specimen:  Blood Updated:  10/22/17 1643     Glucose 100 mg/dL     Narrative:       Meter: [id number] : [id number] Victor Manuel Rosario    Blood Culture - Blood, [[id number]]  (Normal) Collected:  10/17/17 1818    Specimen:  Blood from Arm, Right Updated:  10/22/17 1831     Blood Culture No growth at 5 days    Blood Culture - Blood, [[id number]]  (Normal) Collected:  10/17/17 1848    Specimen:  Blood from Hand, Left Updated:  10/22/17 1901     Blood Culture No growth at 5 days    POC Glucose Fingerstick " [[id number]]  (Normal) Collected:  10/22/17 2012    Specimen:  Blood Updated:  10/22/17 2018     Glucose 120 mg/dL     Narrative:       Meter: [id number] : [id number] david boyer    CBC & Differential [[id number]] Collected:  10/23/17 0146    Specimen:  Blood Updated:  10/23/17 0238    Narrative:       The following orders were created for panel order CBC & Differential.  Procedure                               Abnormality         Status                     ---------                               -----------         ------                     CBC Auto Differential[[id number]]        Abnormal            Final result                 Please view results for these tests on the individual orders.    CBC Auto Differential [[id number]]  (Abnormal) Collected:  10/23/17 0146    Specimen:  Blood Updated:  10/23/17 0238     WBC 6.84 10*3/mm3      RBC 3.79 (L) 10*6/mm3      Hemoglobin 11.4 (L) g/dL      Hematocrit 35.9 (L) %      MCV 94.7 (H) fL      MCH 30.1 pg      MCHC 31.8 (L) g/dL      RDW 14.3 %      RDW-SD 46.8 fl      MPV 11.4 (H) fL      Platelets 192 10*3/mm3      Neutrophil % 53.5 %      Lymphocyte % 35.5 %      Monocyte % 5.6 %      Eosinophil % 4.7 %      Basophil % 0.4 %      Immature Grans % 0.3 %      Neutrophils, Absolute 3.66 10*3/mm3      Lymphocytes, Absolute 2.43 10*3/mm3      Monocytes, Absolute 0.38 10*3/mm3      Eosinophils, Absolute 0.32 10*3/mm3      Basophils, Absolute 0.03 10*3/mm3      Immature Grans, Absolute 0.02 10*3/mm3     C-reactive Protein [[id number]]  (Abnormal) Collected:  10/23/17 0146    Specimen:  Blood Updated:  10/23/17 0300     C-Reactive Protein 1.41 (H) mg/dL     Comprehensive Metabolic Panel [[id number]]  (Abnormal) Collected:  10/23/17 0146    Specimen:  Blood Updated:  10/23/17 0304     Glucose 127 (H) mg/dL      BUN 18 mg/dL      Creatinine 0.79 mg/dL      Sodium 143 mmol/L      Potassium 3.8 mmol/L      Chloride 110 mmol/L      CO2 27.3 mmol/L      Calcium 9.2 mg/dL      Total  Protein 5.7 (L) g/dL      Albumin 3.10 (L) g/dL      ALT (SGPT) 24 U/L      AST (SGOT) 35 (H) U/L      Alkaline Phosphatase 79 U/L       Note New Reference Ranges        Total Bilirubin 0.5 mg/dL      eGFR Non African Amer 71 mL/min/1.73      Globulin 2.6 gm/dL      A/G Ratio 1.2 (L) g/dL      BUN/Creatinine Ratio 22.8     Anion Gap 5.7 mmol/L     Narrative:       The MDRD GFR formula is only valid for adults with stable renal function between ages 18 and 70.    Magnesium [[id number]]  (Normal) Collected:  10/23/17 0146    Specimen:  Blood Updated:  10/23/17 0304     Magnesium 1.9 mg/dL     Osmolality, Calculated [[id number]]  (Normal) Collected:  10/23/17 0146    Specimen:  Blood Updated:  10/23/17 0304     Osmolality Calc 288.5 mOsm/kg     Phosphorus [[id number]]  (Normal) Collected:  10/23/17 0146    Specimen:  Blood Updated:  10/23/17 0321     Phosphorus 2.8 mg/dL     POC Glucose Fingerstick [[id number]]  (Normal) Collected:  10/23/17 0801    Specimen:  Blood Updated:  10/23/17 0815     Glucose 97 mg/dL     Narrative:       Meter: [id number] : [id number] dickinson dillon    POC Glucose Fingerstick [[id number]]  (Normal) Collected:  10/23/17 1131    Specimen:  Blood Updated:  10/23/17 1142     Glucose 93 mg/dL     Narrative:       Meter: [id number] : [id number] dickinson dillon        Imaging Results (last 24 hours)     ** No results found for the last 24 hours. **        ECG/EMG Results (last 24 hours)     ** No results found for the last 24 hours. **           Physician Progress Notes (last 24 hours) (Notes from 10/22/2017  2:35 PM through 10/23/2017  2:35 PM)      Sully Hoang PA-C at 10/23/2017 11:20 AM  Version 1 of 1           I have personally seen and examined the patient today and discussed overnight interval progress and pertinent issues with nursing staff.    Subjective:    Patient was resting comfortably this morning the CRP level at 1.41 and normal white count.  No issues reported by the  "patient's nurse.    History taken from: patient chart RN      Vital Signs    /78 (BP Location: Left arm, Patient Position: Lying)  Pulse 88  Temp 98.4 °F (36.9 °C) (Oral)   Resp 20  Ht 66\" (167.6 cm)  Wt 187 lb 1.6 oz (84.9 kg)  SpO2 98%  BMI 30.2 kg/m2    Temp:  [97.4 °F (36.3 °C)-98.4 °F (36.9 °C)] 98.4 °F (36.9 °C)      Intake/Output Summary (Last 24 hours) at 10/23/17 1120  Last data filed at 10/23/17 0300   Gross per 24 hour   Intake              360 ml   Output                0 ml   Net              360 ml     Intake & Output (last 3 days)       10/20 0701 - 10/21 0700 10/21 0701 - 10/22 0700 10/22 0701 - 10/23 0700 10/23 0701 - 10/24 0700    P.O. 480 720 360     Total Intake(mL/kg) 480 (5.8) 720 (8.7) 360 (4.2)     Urine (mL/kg/hr)        Stool        Total Output            Net +480 +720 +360              Unmeasured Urine Occurrence 10 x 8 x 8 x     Unmeasured Stool Occurrence 5 x 4 x 1 x           Physical exam:        General Appearance:     Confused in no acute distress, very sedated   Head:    Normocephalic, without obvious abnormality, atraumatic   Eyes:            Lids and lashes normal, conjunctivae and sclerae normal, no   icterus, no pallor, corneas clear, PERRLA   Ears:    Ears appear intact with no abnormalities noted   Throat:   No oral lesions, no thrush, oral mucosa moist   Neck:   No adenopathy, supple, trachea midline, no thyromegaly, no   carotid bruit, no JVD   Back:     No tenderness to percussion or palpation, range of motion   normal   Lungs:    Decreased in the bases     Heart:    Regular rhythm and normal rate, normal S1 and S2, no            murmur, no gallop, no rub, no click   Chest Wall:    No abnormalities observed   Abdomen:     Normal bowel sounds, no masses, no organomegaly, soft        non-tender, non-distended, no guarding, no rebound                tenderness   Rectal:     Deferred   Extremities:   Moves all extremities well, no edema, no cyanosis, no            "  redness   Pulses:   Pulses palpable and equal bilaterally   Skin:   No bleeding, bruising or rash   Lymph nodes:   No palpable adenopathy   Neurologic:    Confused, very sedated         Results:      Results from last 7 days  Lab Units 10/23/17  0146 10/22/17  0520 10/21/17  0055 10/20/17  0055 10/19/17  0047 10/18/17  0102 10/17/17  1818   WBC 10*3/mm3 6.84 7.35 9.37 9.48 9.08 7.85 12.03     Lab Results   Component Value Date    NEUTROABS 3.66 10/23/2017         Results from last 7 days  Lab Units 10/23/17  0146   CREATININE mg/dL 0.79         Results from last 7 days  Lab Units 10/23/17  0146 10/22/17  0520 10/21/17  0055   CRP mg/dL 1.41* 2.49* 2.22*         Results Review:    I have personally reviewed laboratory data, culture results, radiology studies and antimicrobial therapy.    Hospital Medications (active)       Dose Frequency Start End    acetaminophen (TYLENOL) tablet 650 mg 650 mg Every 4 Hours PRN 10/17/2017     Sig - Route: Take 2 tablets by mouth Every 4 (Four) Hours As Needed for Mild Pain  or Headache. - Oral    atorvastatin (LIPITOR) tablet 40 mg 40 mg Nightly 10/18/2017     Sig - Route: Take 1 tablet by mouth Every Night. - Oral    bisacodyl (DULCOLAX) suppository 10 mg 10 mg Daily PRN 10/17/2017     Sig - Route: Insert 1 suppository into the rectum Daily As Needed for Constipation. - Rectal    clonazePAM (KlonoPIN) tablet 0.5 mg 0.5 mg 2 Times Daily PRN 10/18/2017     Sig - Route: Take 1 tablet by mouth 2 (Two) Times a Day As Needed for Anxiety. - Oral    dextrose (D50W) solution 25 g 25 g Every 15 Minutes PRN 10/17/2017     Sig - Route: Infuse 50 mL into a venous catheter Every 15 (Fifteen) Minutes As Needed for Low Blood Sugar (Blood Sugar Less Than 70, Patient Has IV Access - Unresponsive, NPO or Unable To Safely Swallow). - Intravenous    dextrose (GLUTOSE) oral gel 15 g 15 g Every 15 Minutes PRN 10/17/2017     Sig - Route: Take 15 g by mouth Every 15 (Fifteen) Minutes As Needed for Low  "Blood Sugar (Blood Sugar Less Than 70, Patient Alert, Is Not NPO & Can Safely Swallow). - Oral    donepezil (ARICEPT) tablet 10 mg 10 mg Nightly 10/18/2017     Sig - Route: Take 2 tablets by mouth Every Night. - Oral    ertapenem (INVanz) 1 g/100 mL 0.9% NS VTB (mbp) 1 g Every 24 Hours 10/21/2017     Sig - Route: Infuse 100 mL into a venous catheter Daily. - Intravenous    escitalopram (LEXAPRO) tablet 10 mg 10 mg Nightly 10/18/2017     Sig - Route: Take 1 tablet by mouth Every Night. - Oral    glucagon (GLUCAGEN) injection 1 mg 1 mg Every 15 Minutes PRN 10/17/2017     Sig - Route: Inject 1 mg under the skin Every 15 (Fifteen) Minutes As Needed (Blood Glucose Less Than 70 - Patient Without IV Access - Unresponsive, NPO or Unable To Safely Swallow). - Subcutaneous    heparin (porcine) 5000 UNIT/ML injection 5,000 Units 5,000 Units Every 12 Hours Scheduled 10/17/2017     Sig - Route: Inject 1 mL under the skin Every 12 (Twelve) Hours. - Subcutaneous    insulin aspart (novoLOG) injection 0-7 Units 0-7 Units 4 Times Daily Before Meals & Nightly 10/18/2017     Sig - Route: Inject 0-7 Units under the skin 4 (Four) Times a Day Before Meals & at Bedtime. - Subcutaneous    levothyroxine (SYNTHROID, LEVOTHROID) tablet 25 mcg 25 mcg Nightly 10/18/2017     Sig - Route: Take 1 tablet by mouth Every Night. - Oral    Cosign for Ordering: Accepted by Yusef Rivera DO on 10/21/2017  5:40 PM    Magnesium Sulfate 2 gram Bolus, followed by 8 gram infusion (total Mg dose 10 grams)- Mg less than or equal to 1mg/dL 2 g As Needed 10/20/2017     Sig - Route: Infuse 50 mL into a venous catheter As Needed (Mg less than or equal to 1mg/dL). - Intravenous    Linked Group 1:  \"Or\" Linked Group Details        magnesium sulfate 4 gram infusion- Mg 1.6-1.9 mg/dL 4 g As Needed 10/20/2017     Sig - Route: Infuse 100 mL into a venous catheter As Needed (Mg 1.6-1.9 mg/dL). - Intravenous    Linked Group 1:  \"Or\" Linked Group Details        " "Magnesium Sulfate 6 gram Infusion (2 gm x 3) -Mg 1.1 -1.5 mg/dL 2 g As Needed 10/20/2017     Sig - Route: Infuse 50 mL into a venous catheter As Needed (Mg 1.1 -1.5 mg/dL). - Intravenous    Linked Group 1:  \"Or\" Linked Group Details        megestrol (MEGACE) 40 MG/ML suspension 400 mg 400 mg 2 Times Daily 10/20/2017     Sig - Route: Take 10 mL by mouth 2 (Two) Times a Day. - Oral    memantine (NAMENDA) tablet 10 mg 10 mg Every 12 Hours Scheduled 10/18/2017     Sig - Route: Take 1 tablet by mouth Every 12 (Twelve) Hours. - Oral    ondansetron (ZOFRAN) tablet 4 mg 4 mg Every 6 Hours PRN 10/17/2017     Sig - Route: Take 1 tablet by mouth Every 6 (Six) Hours As Needed for Nausea or Vomiting. - Oral    potassium chloride (KLOR-CON) packet 40 mEq 40 mEq As Needed 10/20/2017     Sig - Route: Take 40 mEq by mouth As Needed (potassium replacement, see admin instructions). - Oral    Cosign for Ordering: Accepted by Yusef Rivera DO on 10/21/2017  5:40 PM    Linked Group 2:  \"Or\" Linked Group Details        potassium chloride (MICRO-K) CR capsule 40 mEq 40 mEq As Needed 10/20/2017     Sig - Route: Take 4 capsules by mouth As Needed (potassium replacement.  see admin instructions). - Oral    Cosign for Ordering: Accepted by Yusef Rivera DO on 10/21/2017  5:40 PM    Linked Group 2:  \"Or\" Linked Group Details        potassium chloride 10 mEq in 100 mL IVPB 10 mEq Every 1 Hour PRN 10/20/2017     Sig - Route: Infuse 100 mL into a venous catheter Every 1 (One) Hour As Needed (potassium protocol PERIPHERAL - see admin instructions). - Intravenous    Cosign for Ordering: Accepted by Yusef Rivera DO on 10/21/2017  5:40 PM    Linked Group 2:  \"Or\" Linked Group Details        Risaquad-2 capsule 1 capsule 1 capsule Daily 10/18/2017     Sig - Route: Take 1 capsule by mouth Daily. - Oral    sodium chloride 0.9 % flush 1-10 mL 1-10 mL As Needed 10/17/2017     Sig - Route: Infuse 1-10 mL into a venous catheter As " "Needed for Line Care. - Intravenous    sodium chloride 0.9 % flush 10 mL 10 mL As Needed 10/17/2017     Sig - Route: Infuse 10 mL into a venous catheter As Needed for Line Care. - Intravenous    Linked Group 3:  \"And\" Linked Group Details        Vortioxetine HBr tablet 10 mg 10 mg Nightly 10/18/2017     Sig - Route: Take 10 mg by mouth Every Night. - Oral    Non-formulary Exception Code: Patient supplied medication    sodium chloride 0.9 % infusion (Discontinued) 60 mL/hr Continuous 10/17/2017 10/21/2017    Sig - Route: Infuse 60 mL/hr into a venous catheter Continuous. - Intravenous            Cultures:    Blood Culture   Date Value Ref Range Status   10/17/2017 No growth at 4 days  Preliminary   10/17/2017 No growth at 4 days  Preliminary     Urine Culture   Date Value Ref Range Status   10/17/2017 >100,000 CFU/mL Escherichia coli (A)  Final     Comment:     This organism is a suspected ESBL .  Strains of Klebsiella spp. E coli, and a few other genera of the Enterobacteriaceae family that produce extended spectrum beta lactamase (ESBL) may be clinically resistant to therapy with penicillins, ceph  alosporins, or aztreonam, despite apparent in vitro susceptibility to some of these agents.           Assessment/Plan     ASSESSMENT:    #1 UTI     PLAN:    Patient was resting comfortably this morning the CRP level at 1.41 and normal white count.  No issues reported by the patient's nurse.  Would recommend to continue Invanz 1 g IV every 24 hours.     Urine culture from 12/17/17 finalized as greater than 100,000 colonies of Escherichia coli suspected to be an ESBL susceptible to ertapenem.    Patient's findings and recommendations were discussed with patient and nursing staff    Code Status: Full Code    Sully Hoang PA-C  10/23/17  11:20 AM       Electronically signed by Sully Hoang PA-C at 10/23/2017 11:27 AM        Medical Student Notes (last 24 hours) (Notes from 10/22/2017  2:35 PM through " 10/23/2017  2:35 PM)     No notes of this type exist for this encounter.        Consult Notes (last 24 hours) (Notes from 10/22/2017  2:35 PM through 10/23/2017  2:35 PM)     No notes of this type exist for this encounter.        Nutrition Notes (last 24 hours) (Notes from 10/22/2017  2:35 PM through 10/23/2017  2:35 PM)     No notes of this type exist for this encounter.        Physical Therapy Notes (last 24 hours) (Notes from 10/22/2017  2:35 PM through 10/23/2017  2:35 PM)     No notes of this type exist for this encounter.        Occupational Therapy Notes (last 24 hours) (Notes from 10/22/2017  2:35 PM through 10/23/2017  2:35 PM)     No notes of this type exist for this encounter.        Speech Language Pathology Notes (last 24 hours) (Notes from 10/22/2017  2:35 PM through 10/23/2017  2:35 PM)     No notes of this type exist for this encounter.        Respiratory Therapy Notes (last 24 hours) (Notes from 10/22/2017  2:35 PM through 10/23/2017  2:35 PM)     No notes of this type exist for this encounter.          Discharge Summary     No notes of this type exist for this encounter.        Discharge Order     Start     Ordered    10/23/17 1427  Discharge patient  Once     Expected Discharge Date:  10/23/17    Expected Discharge Time:  Afternoon    Discharge Disposition:  Skilled Nursing Facility (DC - External)        10/23/17 0506

## 2017-10-23 NOTE — NURSING NOTE
DR. QUAN PAGED PER INFECTION CONTROL NURSE REQUEST TO NOTIFY OF NEED FOR NEW U[facility] ORDER.  WAITING ON RESPONSE.

## 2017-10-23 NOTE — THERAPY TREATMENT NOTE
Acute Care - Physical Therapy Treatment Note   Noel     Patient Name: Luz Beasley  : 1940  MRN: [id number]  Today's Date: 10/23/2017  Onset of Illness/Injury or Date of Surgery Date: 10/17/17  Date of Referral to PT: 10/17/17  Referring Physician: Dr. Heredia    Admit Date: 10/17/2017    Visit Dx:    ICD-10-CM ICD-9-CM   1. Acute cystitis without hematuria N30.00 595.0   2. 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 Physical debility R53.81 799.3     Patient Active Problem List   Diagnosis   • Diabetes mellitus*   • Hyperlipemia*   • Senile dementia*   • Vitamin D deficiency*   • Essential hypertension   • Obesity*   • Hyperparathyroidism status post parathyroidectomy*   • Arthritis   • Loss of balance   • Acquired hypothyroidism   • Anxiety   • Cluster headaches   • UTI (urinary tract infection)   • Sepsis               Adult Rehabilitation Note       10/23/17 1515          Rehab Assessment/Intervention    Discipline physical therapy assistant  -RF      Document Type therapy note (daily note)  -RF      Subjective Information decreased LOC  -RF      Patient Effort, Rehab Treatment poor  -RF      Symptoms Noted During/After Treatment fatigue  -RF      Precautions/Limitations fall precautions;oxygen therapy device and L/min  -RF      Patient Response to Treatment Patient assisted to EOB sitting, but complained of burning and pain in her back and buttock; pt insisted to lay back down. Some TP completed, but mod/max A was required for each activity. Pt requires cues for participation throughout.   -RF      Recorded by [RF] Patt Sandoval PTA      Pain Assessment    Pain Assessment Harvey-Bhakta FACES  -RF      Harvey-Bhakta FACES Pain Rating 6  -RF      Pain Location Buttocks  -RF      Pain Intervention(s) Repositioned;Rest  -RF      Recorded by [RF] Patt Sandoval PTA      Cognitive Assessment/Intervention    Current Cognitive/Communication Assessment impaired  -RF      Orientation Status  oriented to;person  -RF      Follows Commands/Answers Questions needs cueing;needs increased time;needs repetition;unable/difficult to assess  -RF      Personal Safety decreased awareness, need for assist;decreased awareness, need for safety  -RF      Personal Safety Interventions fall prevention program maintained;muscle strengthening facilitated;gait belt;nonskid shoes/slippers when out of bed  -RF      Recorded by [RF] Patt Sandoval PTA      Bed Mobility, Assessment/Treatment    Bed Mobility, Assistive Device bed rails;draw sheet  -RF      Bed Mobility, Roll Right, [place] verbal cues required;nonverbal cues required (demo/gesture);maximum assist (25% patient effort);2 person assist required  -RF      Bed Mobility, Scoot/Bridge, [place] maximum assist (25% patient effort);2 person assist required  -RF      Bed Mob, Supine to Sit, [place] verbal cues required;nonverbal cues required (demo/gesture);maximum assist (25% patient effort);2 person assist required  -RF      Bed Mob, Sit to Supine, [place] verbal cues required;nonverbal cues required (demo/gesture);maximum assist (25% patient effort);2 person assist required  -RF      Bed Mobility, Safety Issues cognitive deficits limit understanding;decreased use of arms for pushing/pulling;decreased use of legs for bridging/pushing  -RF      Bed Mobility, Impairments strength decreased  -RF      Recorded by [RF] Patt Sandoval PTA      Transfer Assessment/Treatment    Transfers, Bed-Chair [place] unable to perform  -RF      Recorded by [RF] Patt Sandoval PTA      Gait Assessment/Treatment    Gait, [place] Level unable to perform  -RF      Recorded by [RF] Patt Sandoval PTA      Therapy Exercises    Bilateral Lower Extremities PROM:;10 reps;sitting  -RF      Recorded by [RF] Patt Sandoval PTA      Positioning and Restraints    Pre-Treatment Position in bed  -RF      Post Treatment Position bed  -RF      In Bed supine;call  light within reach;encouraged to call for assist;exit alarm on  -RF      Recorded by [RF] Patt Sandoval PTA        User Key  (r) = Recorded By, (t) = Taken By, (c) = Cosigned By    Initials Name Effective Dates    RF aPtt Sandoval PTA 07/19/17 -                 IP PT Goals       10/18/17 1647          Bed Mobility PT LTG    Bed Mobility PT LTG, Date Established 10/18/17  -AG      Bed Mobility PT LTG, Time to Achieve by discharge  -AG      Bed Mobility PT LTG, Activity Type supine to sit/sit to supine;roll left/roll right  -AG      Bed Mobility PT LTG, [place] Level moderate assist (50% patient effort)  -AG      Bed Mobility PT Goal  LTG, Assist Device bed rails  -AG      Transfer Training PT LTG    Transfer Training PT LTG, Date Established 10/18/17  -AG      Transfer Training PT LTG, Time to Achieve by discharge  -AG      Transfer Training PT LTG, Activity Type bed to chair /chair to bed;sit to stand/stand to sit  -AG      Transfer Training PT LTG, [place] Level moderate assist (50% patient effort)  -AG      Transfer Training PT LTG, Assist Device walker, rolling;other (see comments)   or appropriate a.d.  -AG      Gait Training PT LTG    Gait Training Goal PT LTG, Date Established 10/18/17  -AG      Gait Training Goal PT LTG, Time to Achieve by discharge  -AG      Gait Training Goal PT LTG, [place] Level moderate assist (50% patient effort);2 person assist required  -AG      Gait Training Goal PT LTG, Assist Device walker, rolling;other (see comments)   or appropriate a.d./ HHA   -AG      Gait Training Goal PT LTG, Distance to Achieve 10  -AG        User Key  (r) = Recorded By, (t) = Taken By, (c) = Cosigned By    Initials Name Provider Type    AG Shahida Richardson PT Physical Therapist          Physical Therapy Education     Title: PT OT SLP Therapies (Done)     Topic: Physical Therapy (Done)     Point: Mobility training (Done)    Learning Progress Summary    Learner Readiness Method Response  Comment Documented by Status   Patient Acceptance E VU,NR   10/23/17 1517 Done    Acceptance E VU  KM 10/22/17 2052 Done    Acceptance E NR,NL   10/22/17 2052 Active    Acceptance E NR,NL   10/21/17 2105 Active    Acceptance E VU  KM 10/21/17 2102 Done    Acceptance E VU  KM 10/20/17 2129 Done    Acceptance E VU,NR   10/20/17 1431 Done    Acceptance E VU,NR   10/19/17 2124 Done    Acceptance E VU,NR   10/18/17 2103 Done    Acceptance E,D NR,NL  AG 10/18/17 1646 Active               Point: Home exercise program (Done)    Learning Progress Summary    Learner Readiness Method Response Comment Documented by Status   Patient Acceptance E VU,NR   10/23/17 1517 Done    Acceptance E VU  KM 10/22/17 2052 Done    Acceptance E NR,NL   10/22/17 2052 Active    Acceptance E NR,NL   10/21/17 2105 Active    Acceptance E VU  KM 10/21/17 2102 Done    Acceptance E VU   10/20/17 2129 Done    Acceptance E VU,NR   10/20/17 1431 Done    Acceptance E VU,NR   10/19/17 2124 Done    Acceptance E VU,NR   10/18/17 2103 Done    Acceptance E,D NR,NL   10/18/17 1646 Active               Point: Body mechanics (Done)    Learning Progress Summary    Learner Readiness Method Response Comment Documented by Status   Patient Acceptance E VU,NR   10/23/17 1517 Done    Acceptance E VU  KM 10/22/17 2052 Done    Acceptance E NR,NL   10/22/17 2052 Active    Acceptance E NR,NL   10/21/17 2105 Active    Acceptance E VU  KM 10/21/17 2102 Done    Acceptance E VU  KM 10/20/17 2129 Done    Acceptance E VU,NR   10/20/17 1431 Done    Acceptance E VU,NR   10/19/17 2124 Done    Acceptance E VU,NR   10/18/17 2103 Done    Acceptance E,D NR,NL   10/18/17 1646 Active               Point: Precautions (Done)    Learning Progress Summary    Learner Readiness Method Response Comment Documented by Status   Patient Acceptance E VU,NR   10/23/17 1517 Done    Acceptance E VU  KM 10/22/17 2052 Done    Acceptance E NR,NL   10/22/17 2052  Active    Acceptance E NR,NL   10/21/17 2105 Active    Acceptance E VU   10/21/17 2102 Done    Acceptance E VU   10/20/17 2129 Done    Acceptance E VU,NR   10/20/17 1431 Done    Acceptance E VU,NR   10/19/17 2124 Done    Acceptance E VU,NR   10/18/17 2103 Done    Acceptance E,D NR,NL   10/18/17 1646 Active                      User Key     Initials Effective Dates Name Provider Type Discipline     06/16/16 -  Juan Griffith, RN Registered Nurse Nurse     03/14/16 -  Shahida Richardson, PT Physical Therapist PT     04/14/17 -  Pramod Lomeli, RN Registered Nurse Nurse     07/19/17 -  Patt Sandoval, NGHIA Physical Therapy Assistant PT                    PT Recommendation and Plan  Anticipated Equipment Needs At Discharge:  (TBD)  Anticipated Discharge Disposition: home with assist, home with home health, extended care facility  Planned Therapy Interventions: balance training, bed mobility training, gait training, home exercise program, neuromuscular re-education, patient/family education, ROM (Range of Motion), strengthening, transfer training  PT Frequency: 3-5 times/wk, per priority policy             Outcome Measures       10/23/17 1500          How much help from another person do you currently need...    Turning from your back to your side while in flat bed without using bedrails? 2  -RF      Moving from lying on back to sitting on the side of a flat bed without bedrails? 2  -RF      Moving to and from a bed to a chair (including a wheelchair)? 1  -RF      Standing up from a chair using your arms (e.g., wheelchair, bedside chair)? 1  -RF      Climbing 3-5 steps with a railing? 1  -RF      To walk in hospital room? 1  -RF      AM-PAC 6 Clicks Score 8  -RF      Functional Assessment    Outcome Measure Options AM-PAC 6 Clicks Basic Mobility (PT)  -RF        User Key  (r) = Recorded By, (t) = Taken By, (c) = Cosigned By    Initials Name Provider Type    RF Patt Sandoval PTA Physical  Therapy Assistant           Time Calculation:         PT Charges       10/23/17 1518          Time Calculation    PT Received On 10/23/17  -RF      PT - Next Appointment 10/24/17  -RF      PT Goal Re-Cert Due Date 11/01/17  -RF      Time Calculation- PT    Total Timed Code Minutes- PT 20 minute(s)  -RF        User Key  (r) = Recorded By, (t) = Taken By, (c) = Cosigned By    Initials Name Provider Type    RF Patt Sandoval PTA Physical Therapy Assistant          Therapy Charges for Today     Code Description Service Date Service Provider Modifiers Qty    61938614055 HC PT THERAPEUTIC ACT EA 15 MIN 10/23/2017 Patt Sandoval PTA GP 1    03997761201 HC PT THER SUPP EA 15 MIN 10/23/2017 Patt Sandoval PTA GP 1          PT G-Codes  Outcome Measure Options: AM-PAC 6 Clicks Basic Mobility (PT)  Score: 8  Functional Limitation: Mobility: Walking and moving around  Mobility: Walking and Moving Around Current Status (): At least 80 percent but less than 100 percent impaired, limited or restricted  Mobility: Walking and Moving Around Discharge Status (): At least 40 percent but less than 60 percent impaired, limited or restricted    Patt Sandoval PTA  10/23/2017

## 2017-10-23 NOTE — DISCHARGE SUMMARY
[facility] MEDICINE DISCHARGE SUMMARY    Patient Identification:  Name:  Luz Beasley  Age:  77 y.o.  Sex:  female  :  1940  MRN:  [id number]  Visit Number:  [id number]    Date of Admission: 10/17/2017  Date of Discharge:  10/23/2017   DISCHARGE DISPOSITION   Stable  PCP: Musa Mondragon MD    DISCHARGE DIAGNOSIS   UTI secondary to Escherichia coli ESBL positive requiring ertapenem 1 g IV daily for 7 more days, functional decline requiring physical therapy at the nursing home, dehydration resolved, dementia with no behavioral disturbances, acquired hypothyroidism, dyslipidemia, diabetes type 2 non-insulin-requiring.    HOSPITAL COURSE  For detailed history and physical exam or mammography to Dr. Heredia that was done on 2017 patient was brought in here by family members because of progressive decline and poor intake weakness and with difficulty of taking care by family members.  Workup noted she was dehydrated she has UTI which culture came back Escherichia coli ESBL positive.  Infectious disease was consulted and recommended ertapenem for at least 7 days.  During her stay  was also consulted per family's request for possible nursing home placement since they have difficulty taking care of her at home.  After gentle hydration and antibiotic patient's improved but still has significant functional decline requiring physical therapy.  Because of this she will be discharged to nursing home were physical therapy will be requested to evaluate and treat.  Her activity will be as tolerated with fall precaution and per physical therapy's recommendation.  VITAL SIGNS:  Last 3 weights    10/21/17  0322 10/22/17  0413 10/23/17  0300   Weight: 182 lb 14.4 oz (83 kg) 182 lb 4 oz (82.7 kg) 187 lb 1.6 oz (84.9 kg)     Body mass index is 30.2 kg/(m^2).  Vitals:    10/23/17 1127   BP: 142/84   Pulse: 86   Resp: 18   Temp: 98.1 °F (36.7 °C)   SpO2: 98%     PHYSICAL  EXAM:  General: Comfortable,awake, alert, oriented to self, place, and time, well-developed and well-nourished.  No respiratory distress.    Skin:  Skin is warm and dry. No rash noted. No pallor.    HENT:  Head:  Normocephalic and atraumatic.  Mouth:  Moist mucous membranes.    Eyes:  Conjunctivae and EOM are normal.  Pupils are equal, round, and reactive to light.  No scleral icterus.    Neck:  Neck supple.  No JVD present.    Pulmonary/Chest:  No respiratory distress, no wheezes, no crackles, with normal breath sounds and good air movement.  Cardiovascular:  Normal rate, regular rhythm and normal heart sounds with no murmur.  Abdominal:  Soft.  Bowel sounds are normal.  No distension and no tenderness.   Extremities:  No edema, no tenderness, and no deformity.  No red or swollen joints anywhere.  Strong pulses in all 4 extremities with no clubbing, no cyanosis, no edema.  Neurological:  Motor strength equal no obvious deficit, sensory grossly intact.   No cranial nerve deficit.  No tongue deviation.  No facial droop.  No slurred speech.      ----------    DISCHARGE MEDICATIONS:   Luz Beasley   Gray Medication Instructions KAMRON:[id number]    Printed on:10/23/17 1412   Medication Information                      atorvastatin (LIPITOR) 80 MG tablet  Take 0.5 tablets by mouth Daily.             clonazePAM (KlonoPIN) 0.5 MG tablet  Take 1 tablet by mouth 2 (Two) Times a Day As Needed for Anxiety.             escitalopram (LEXAPRO) 10 MG tablet  Take 10 mg by mouth Every Night.             levothyroxine (LEVOTHROID) 25 MCG tablet  Take 1 tablet by mouth Daily.             Melatonin 3 MG tablet dispersible  Take 1 tablet by mouth Every Night.             metFORMIN (GLUCOPHAGE) 500 MG tablet  Take 0.5 tablets by mouth Daily With Breakfast.             NAMZARIC 28-10 MG capsule sustained-release 24 hr  Take 1 tablet by mouth Every Night.             topiramate (TOPAMAX) 25 MG tablet  Take 1 tablet by mouth 2 (Two) Times  a Day.             Vortioxetine HBr 10 MG tablet  Take 10 mg by mouth Daily.                   Your Scheduled Appointments     Nov 13, 2017  3:45 PM EST   Medicine Check with SHAI Robles   [facility] (--)    [address]  [facility] [address]   [phone]            Dec 04, 2017 10:15 AM EST   Follow Up with Musa Mondragon MD   [facility] CARDIOLOGY (--)    [address] [place]  [facility] [address]   [phone]           Arrive 15 minutes prior to appointment.                      Melody Kurtz MD  10/23/17  2:12 PM    Please note that this discharge summary required more than 30 minutes to complete.

## 2017-10-23 NOTE — PLAN OF CARE
Problem: Patient Care Overview (Adult)  Goal: Plan of Care Review  Outcome: Ongoing (interventions implemented as appropriate)    Problem: Fall Risk (Adult)  Goal: Identify Related Risk Factors and Signs and Symptoms  Outcome: Ongoing (interventions implemented as appropriate)  Goal: Absence of Falls  Outcome: Ongoing (interventions implemented as appropriate)    Problem: Infection, Risk/Actual (Adult)  Goal: Identify Related Risk Factors and Signs and Symptoms  Outcome: Ongoing (interventions implemented as appropriate)  Goal: Infection Prevention/Resolution  Outcome: Ongoing (interventions implemented as appropriate)    Problem: Pressure Ulcer Risk (Calin Scale) (Adult,Obstetrics,Pediatric)  Goal: Identify Related Risk Factors and Signs and Symptoms  Outcome: Ongoing (interventions implemented as appropriate)  Goal: Skin Integrity  Outcome: Ongoing (interventions implemented as appropriate)

## 2017-10-23 NOTE — PROGRESS NOTES
Discharge Planning Assessment  [facility]     Patient Name: Luz Beasley  MRN: [id number]  Today's Date: 10/23/2017    Admit Date: 10/17/2017          Discharge Needs Assessment     None            Discharge Plan       10/23/17 1630    Final Note    Final Note Pt to be discharged to [facility] on this date.  Facility aware and agreeable per Shahida.  Pt Guardian aware and agreeable.  Pt to be transported via EMS.  No further intervention needed.        Discharge Placement     No information found        Expected Discharge Date and Time     Expected Discharge Date Expected Discharge Time    Oct 23, 2017               Demographic Summary     None            Functional Status     None            Psychosocial     None            Abuse/Neglect     None            Legal     None            Substance Abuse     None            Patient Forms     None          Alysa Page

## 2017-10-24 NOTE — SIGNIFICANT NOTE
Decreased alertness and unable to participate in skilled OT training on this date. OT will attempt and continue as tolerated.

## 2017-11-05 ENCOUNTER — LAB REQUISITION (OUTPATIENT)
Dept: LAB | Facility: HOSPITAL | Age: 77
End: 2017-11-05

## 2017-11-05 DIAGNOSIS — R30.0 DYSURIA: ICD-10-CM

## 2017-11-05 LAB
BACTERIA UR QL AUTO: ABNORMAL /HPF
BILIRUB UR QL STRIP: ABNORMAL
CLARITY UR: ABNORMAL
COD CRY URNS QL: ABNORMAL /HPF
COLOR UR: ABNORMAL
GLUCOSE UR STRIP-MCNC: NEGATIVE MG/DL
HGB UR QL STRIP.AUTO: ABNORMAL
HYALINE CASTS UR QL AUTO: ABNORMAL /LPF
KETONES UR QL STRIP: NEGATIVE
LEUKOCYTE ESTERASE UR QL STRIP.AUTO: ABNORMAL
NITRITE UR QL STRIP: POSITIVE
PH UR STRIP.AUTO: <=5 [PH] (ref 5–8)
PROT UR QL STRIP: ABNORMAL
RBC # UR: ABNORMAL /HPF
REF LAB TEST METHOD: ABNORMAL
SP GR UR STRIP: 1.03 (ref 1–1.03)
SQUAMOUS #/AREA URNS HPF: ABNORMAL /HPF
UROBILINOGEN UR QL STRIP: ABNORMAL
WBC UR QL AUTO: ABNORMAL /HPF

## 2017-11-05 PROCEDURE — 87086 URINE CULTURE/COLONY COUNT: CPT | Performed by: INTERNAL MEDICINE

## 2017-11-05 PROCEDURE — 87077 CULTURE AEROBIC IDENTIFY: CPT | Performed by: INTERNAL MEDICINE

## 2017-11-05 PROCEDURE — 87186 SC STD MICRODIL/AGAR DIL: CPT | Performed by: INTERNAL MEDICINE

## 2017-11-05 PROCEDURE — 81001 URINALYSIS AUTO W/SCOPE: CPT | Performed by: INTERNAL MEDICINE

## 2017-11-07 ENCOUNTER — LAB REQUISITION (OUTPATIENT)
Dept: LAB | Facility: HOSPITAL | Age: 77
End: 2017-11-07

## 2017-11-07 DIAGNOSIS — A41.9 SEPSIS (HCC): ICD-10-CM

## 2017-11-07 DIAGNOSIS — I10 ESSENTIAL (PRIMARY) HYPERTENSION: ICD-10-CM

## 2017-11-07 LAB
ANION GAP SERPL CALCULATED.3IONS-SCNC: 7.7 MMOL/L (ref 3.6–11.2)
BUN BLD-MCNC: 13 MG/DL (ref 7–21)
BUN/CREAT SERPL: 14.6 (ref 7–25)
CALCIUM SPEC-SCNC: 8.8 MG/DL (ref 7.7–10)
CHLORIDE SERPL-SCNC: 115 MMOL/L (ref 99–112)
CO2 SERPL-SCNC: 22.3 MMOL/L (ref 24.3–31.9)
CREAT BLD-MCNC: 0.89 MG/DL (ref 0.43–1.29)
GFR SERPL CREATININE-BSD FRML MDRD: 62 ML/MIN/1.73
GLUCOSE BLD-MCNC: 88 MG/DL (ref 70–110)
OSMOLALITY SERPL CALC.SUM OF ELEC: 288.2 MOSM/KG (ref 273–305)
POTASSIUM BLD-SCNC: 3.8 MMOL/L (ref 3.5–5.3)
SODIUM BLD-SCNC: 145 MMOL/L (ref 135–153)

## 2017-11-07 PROCEDURE — 80048 BASIC METABOLIC PNL TOTAL CA: CPT | Performed by: INTERNAL MEDICINE

## 2017-11-09 LAB
BACTERIA SPEC AEROBE CULT: ABNORMAL

## 2017-11-13 ENCOUNTER — OFFICE VISIT (OUTPATIENT)
Dept: PSYCHIATRY | Facility: CLINIC | Age: 77
End: 2017-11-13

## 2017-11-13 ENCOUNTER — TELEPHONE (OUTPATIENT)
Dept: PSYCHIATRY | Facility: CLINIC | Age: 77
End: 2017-11-13

## 2017-11-13 DIAGNOSIS — F33.1 MAJOR DEPRESSIVE DISORDER, RECURRENT EPISODE, MODERATE (HCC): Primary | ICD-10-CM

## 2017-11-13 DIAGNOSIS — F03.90 DEMENTIA WITHOUT BEHAVIORAL DISTURBANCE, UNSPECIFIED DEMENTIA TYPE: ICD-10-CM

## 2017-11-13 PROCEDURE — 99213 OFFICE O/P EST LOW 20 MIN: CPT | Performed by: NURSE PRACTITIONER

## 2017-11-13 NOTE — PROGRESS NOTES
Subjective   Luz Beasley is a 77 y.o. female is here today for medication management follow-up at the [facility], she presents to her appointment about 45 minutes late. She presents with her daughter and nursing aid.     Chief Complaint: Follow-up for depression     History of Present Illness  Patient is currently staying in a nursing home after she had a stroke on October 1 where she was unable to complete any ADLs.  She is seen in the [facility] because of difficulty in mobility and missing her appointment. She has been treated at the nursing home by Dr Bui, her medication was changed from Trintellix to lexapro.  Daughter and nursing aid reports that she has been having more crying spells which could be related to the change.  Patient states that she feels sad and nervous.  She has been sleeping ok but daughter reports that she has been sleeping too much.  She is not able to feed self but currently has a treatment plan to encourage meals and to be able to void without prompting.  She wants to go home but until met this is not possible, wants to go home to spend the holidays with family.  Denies any new stressors.  Denies any new medical issues but is reported to have frequent UTIs.  Denies any current AV hallucinations, denies any current Si/HI.  AT this point, Dr Bui can continues care but if wanting to switch back to Trintellix This provider will write the RX.      The following portions of the patient's history were reviewed and updated as appropriate: allergies, current medications, past family history, past medical history, past social history, past surgical history and problem list.    Review of Systems   Constitutional: Negative for appetite change, chills, diaphoresis, fatigue, fever and unexpected weight change.   HENT: Negative for hearing loss, sore throat, trouble swallowing and voice change.    Eyes: Negative for photophobia and visual disturbance.   Respiratory: Negative for cough, chest  tightness and shortness of breath.    Cardiovascular: Negative for chest pain and palpitations.   Gastrointestinal: Negative for abdominal pain, constipation, nausea and vomiting.   Endocrine: Negative for cold intolerance and heat intolerance.   Genitourinary: Negative for dysuria and frequency.   Musculoskeletal: Positive for gait problem. Negative for arthralgias, back pain, joint swelling and neck stiffness.   Skin: Negative for color change and wound.   Allergic/Immunologic: Negative for environmental allergies and immunocompromised state.   Neurological: Negative for dizziness, tremors, seizures, syncope, weakness, light-headedness and headaches.   Hematological: Negative for adenopathy. Does not bruise/bleed easily.       Objective   Physical Exam   Constitutional: She appears well-developed and well-nourished. No distress.   Neurological: She is alert. Coordination and gait normal.   Vitals reviewed.    There were no vitals taken for this visit.    Medication List:   Current Outpatient Prescriptions   Medication Sig Dispense Refill   • atorvastatin (LIPITOR) 80 MG tablet Take 0.5 tablets by mouth Daily. (Patient taking differently: Take 80 mg by mouth Every Night.) 90 tablet 2   • clonazePAM (KlonoPIN) 0.5 MG tablet Take 1 tablet by mouth 2 (Two) Times a Day As Needed for Anxiety. 10 tablet 0   • ertapenem (INVanz) 1 g/100 mL 0.9% NS VTB (mbp) Infuse 100 mL into a venous catheter Daily. Indications: Infection of Blood or Tissues affecting the Whole Body, Urinary Tract Infection 700 mL 0   • escitalopram (LEXAPRO) 10 MG tablet Take 10 mg by mouth Every Night.     • insulin aspart (novoLOG) 100 UNIT/ML injection Inject 0-7 Units under the skin 4 (Four) Times a Day Before Meals & at Bedtime. 100 mL 0   • levothyroxine (LEVOTHROID) 25 MCG tablet Take 1 tablet by mouth Daily. (Patient taking differently: Take 25 mcg by mouth Every Night.) 90 tablet 2   • megestrol (MEGACE) 40 MG/ML suspension Take 10 mL by mouth  2 (Two) Times a Day. 600 mL 0   • Melatonin 3 MG tablet dispersible Take 1 tablet by mouth Every Night.     • metFORMIN (GLUCOPHAGE) 500 MG tablet Take 0.5 tablets by mouth Daily With Breakfast. (Patient taking differently: Take 250 mg by mouth Every Night.) 45 tablet 2   • NAMZARIC 28-10 MG capsule sustained-release 24 hr Take 1 tablet by mouth Every Night.     • topiramate (TOPAMAX) 25 MG tablet Take 1 tablet by mouth 2 (Two) Times a Day. (Patient taking differently: Take 25 mg by mouth Every Night. Prescribed bid but patient caregiver/family member says she only takes it once a day) 60 tablet 2   • Vortioxetine HBr 10 MG tablet Take 10 mg by mouth Daily for 1 day. 30 tablet 2     No current facility-administered medications for this visit.        Mental Status Exam:   Hygiene:   fair  Cooperation:  Cooperative  Eye Contact:  Fair  Psychomotor Behavior:  Appropriate  Affect:  Blunted  Hopelessness: Denies  Speech:  Minimal  Thought Process:  Goal directed and Linear  Thought Content:  Normal  Suicidal:  None  Homicidal:  None  Hallucinations:  None  Delusion:  None  Memory:  Intact  Orientation:  Person, Place, Time and Situation  Reliability:  fair  Insight:  Fair  Judgement:  Fair  Impulse Control:  Fair  Physical/Medical Issues:  No     Assessment/Plan   Diagnoses and all orders for this visit:    Major depressive disorder, recurrent episode, moderate    Dementia without behavioral disturbance, unspecified dementia type    Other orders  -     Discontinue: Vortioxetine HBr (TRINTELLIX) 10 MG tablet; Take 15 mg by mouth Daily With Breakfast.  -     Discontinue clonazepam.    Discussed medication options. She is currently being  prescribed medications through Dr Bui while at the Nursing Home.   Reviewed the risks, benefits, and side effects of the medications; patient acknowledged and verbally consented.  Patient is agreeable to call the [facility].  Patient is aware to call 911 or go to the nearest ER  should begin having SI/HI.     Return in 12 weeks.

## 2017-11-14 ENCOUNTER — EPISODE CHANGES (OUTPATIENT)
Dept: CASE MANAGEMENT | Facility: OTHER | Age: 77
End: 2017-11-14

## 2017-11-17 ENCOUNTER — LAB REQUISITION (OUTPATIENT)
Dept: LAB | Facility: HOSPITAL | Age: 77
End: 2017-11-17

## 2017-11-17 DIAGNOSIS — R19.7 DIARRHEA: ICD-10-CM

## 2017-11-17 LAB
027 TOXIN: NORMAL
C DIFF TOX GENS STL QL NAA+PROBE: NEGATIVE

## 2017-11-17 PROCEDURE — 87493 C DIFF AMPLIFIED PROBE: CPT | Performed by: INTERNAL MEDICINE

## 2017-11-28 ENCOUNTER — PATIENT OUTREACH (OUTPATIENT)
Dept: CASE MANAGEMENT | Facility: OTHER | Age: 77
End: 2017-11-28

## 2017-11-28 NOTE — OUTREACH NOTE
Skilled Nursing Facility Discharge Flowsheet:     Skilled Nursing Facility Discharge Assessment 11/28/2017   Acute Facility Discharged From [place]   Acute Discharge Date 10/23/2017   Name of the Skilled Nursing Facility? [facility]   Tier Level of the Skilled Nursing Facility 2   Purpose of SNF Admission PT;OT   Estimated length of stay for the patient? Unknown   Who is the insurance provider or payor of patient stay? Medicare;Part B   Progression of Patient? Patient participates when able to work through barriers of depression and confusion.

## 2017-12-04 ENCOUNTER — OFFICE VISIT (OUTPATIENT)
Dept: CARDIOLOGY | Facility: CLINIC | Age: 77
End: 2017-12-04

## 2017-12-04 VITALS
HEIGHT: 66 IN | DIASTOLIC BLOOD PRESSURE: 80 MMHG | SYSTOLIC BLOOD PRESSURE: 118 MMHG | OXYGEN SATURATION: 95 % | HEART RATE: 93 BPM

## 2017-12-04 DIAGNOSIS — G44.029 CHRONIC CLUSTER HEADACHE, NOT INTRACTABLE: ICD-10-CM

## 2017-12-04 DIAGNOSIS — I10 ESSENTIAL HYPERTENSION: Primary | ICD-10-CM

## 2017-12-04 DIAGNOSIS — E78.2 MIXED HYPERLIPIDEMIA: ICD-10-CM

## 2017-12-04 DIAGNOSIS — E11.9 TYPE 2 DIABETES MELLITUS WITHOUT COMPLICATION, WITHOUT LONG-TERM CURRENT USE OF INSULIN (HCC): ICD-10-CM

## 2017-12-04 DIAGNOSIS — E03.9 ACQUIRED HYPOTHYROIDISM: ICD-10-CM

## 2017-12-04 DIAGNOSIS — F41.9 ANXIETY: ICD-10-CM

## 2017-12-04 DIAGNOSIS — E55.9 VITAMIN D DEFICIENCY: ICD-10-CM

## 2017-12-04 DIAGNOSIS — F03.90 SENILE DEMENTIA WITHOUT BEHAVIORAL DISTURBANCE (HCC): ICD-10-CM

## 2017-12-04 PROCEDURE — 99213 OFFICE O/P EST LOW 20 MIN: CPT | Performed by: INTERNAL MEDICINE

## 2017-12-04 NOTE — PROGRESS NOTES
subjective     Chief Complaint   Patient presents with   • Hyperlipidemia   • Hypertension   • Arthritis     History of Present Illness  Patient is 77 years old white female who is in the nursing home because of functional decline.  Patient is getting physical therapy.  and is getting  stronger.   Patient has severe Alzheimer dementia.  History is very difficult to obtain.    She has hyperlipidemia, diabetes mellitus and hypertension.  Blood pressure is very well controlled with current medications.  According to the nursing home reports lipid control is also good.  She is taking Lipitor 40 mg daily.  4 diabetes she is taking metformin 250 twice a day and sugar has been acceptable.  4 senile dementia she is taking namzaric 28/10.  Patient has hypothyroidism clinically she is euthyroid on Synthroid 25 µg daily.  She also has anxiety and depression.  She is taking Klonopin and trintelex.    Patient has no specific cardiac complaints today.  She denies any chest pain palpitations or shortness of breath.  No orthopnea PND or ankle edema.    Past Surgical History:   Procedure Laterality Date   • CARDIAC CATHETERIZATION  05/05/2006   • CARDIOVASCULAR STRESS TEST  05/05/2006   • COLONOSCOPY  03/21/2008   • HYSTERECTOMY     • THYROID SURGERY       Family History   Problem Relation Age of Onset   • Family history unknown: Yes     Past Medical History:   Diagnosis Date   • Dementia    • Depression    • Diabetes mellitus    • Hyperlipidemia    • Hypertension    • Obesity    • Senile dementia    • Vitamin D deficiency disease      Patient Active Problem List   Diagnosis   • Diabetes mellitus*   • Hyperlipemia*   • Senile dementia*   • Vitamin D deficiency*   • Essential hypertension   • Obesity*   • Hyperparathyroidism status post parathyroidectomy*   • Arthritis   • Loss of balance   • Acquired hypothyroidism   • Anxiety   • Cluster headaches   • UTI (urinary tract infection)   • Sepsis       Social History   Substance Use  Topics   • Smoking status: Never Smoker   • Smokeless tobacco: Never Used   • Alcohol use No       No Known Allergies    Current Outpatient Prescriptions on File Prior to Visit   Medication Sig   • atorvastatin (LIPITOR) 80 MG tablet Take 0.5 tablets by mouth Daily. (Patient taking differently: Take 80 mg by mouth Every Night.)   • clonazePAM (KlonoPIN) 0.5 MG tablet Take 1 tablet by mouth 2 (Two) Times a Day As Needed for Anxiety.   • ertapenem (INVanz) 1 g/100 mL 0.9% NS VTB (mbp) Infuse 100 mL into a venous catheter Daily. Indications: Infection of Blood or Tissues affecting the Whole Body, Urinary Tract Infection   • escitalopram (LEXAPRO) 10 MG tablet Take 10 mg by mouth Every Night.   • insulin aspart (novoLOG) 100 UNIT/ML injection Inject 0-7 Units under the skin 4 (Four) Times a Day Before Meals & at Bedtime.   • levothyroxine (LEVOTHROID) 25 MCG tablet Take 1 tablet by mouth Daily. (Patient taking differently: Take 25 mcg by mouth Every Night.)   • megestrol (MEGACE) 40 MG/ML suspension Take 10 mL by mouth 2 (Two) Times a Day.   • Melatonin 3 MG tablet dispersible Take 1 tablet by mouth Every Night.   • metFORMIN (GLUCOPHAGE) 500 MG tablet Take 0.5 tablets by mouth Daily With Breakfast. (Patient taking differently: Take 250 mg by mouth Every Night.)   • NAMZARIC 28-10 MG capsule sustained-release 24 hr Take 1 tablet by mouth Every Night.   • topiramate (TOPAMAX) 25 MG tablet Take 1 tablet by mouth 2 (Two) Times a Day. (Patient taking differently: Take 25 mg by mouth Every Night. Prescribed bid but patient caregiver/family member says she only takes it once a day)     No current facility-administered medications on file prior to visit.          The following portions of the patient's history were reviewed and updated as appropriate: allergies, current medications, past family history, past medical history, past social history, past surgical history and problem list.    Review of Systems   Constitution:  "Positive for weakness, malaise/fatigue and weight loss.   HENT: Negative.  Negative for congestion.    Eyes: Negative.    Cardiovascular: Negative.  Negative for chest pain, cyanosis, dyspnea on exertion, irregular heartbeat, leg swelling, near-syncope, orthopnea, palpitations, paroxysmal nocturnal dyspnea and syncope.   Respiratory: Negative.  Negative for shortness of breath.    Hematologic/Lymphatic: Negative.    Skin: Negative.    Musculoskeletal: Negative.    Gastrointestinal: Negative.    Genitourinary: Positive for urgency.   Neurological: Negative for headaches.   Psychiatric/Behavioral: Positive for memory loss. The patient is nervous/anxious.           Objective:     /80 (BP Location: Left arm, Patient Position: Sitting, Cuff Size: Adult)  Pulse 93  Ht 66\" (167.6 cm)  SpO2 95%  Physical Exam   Constitutional: She appears well-developed and well-nourished. No distress.   Patient is in wheelchair and cannot ambulate by herself   HENT:   Head: Normocephalic and atraumatic.   Mouth/Throat: Oropharynx is clear and moist. No oropharyngeal exudate.   Eyes: Conjunctivae and EOM are normal. Pupils are equal, round, and reactive to light. No scleral icterus.   Neck: Normal range of motion. Neck supple. No JVD present. No tracheal deviation present. No thyromegaly present.   Cardiovascular: Normal rate, regular rhythm, normal heart sounds and intact distal pulses.  PMI is not displaced.  Exam reveals no gallop, no friction rub and no decreased pulses.    No murmur heard.  Pulses:       Carotid pulses are 3+ on the right side, and 3+ on the left side.       Radial pulses are 3+ on the right side, and 3+ on the left side.   Pulmonary/Chest: Effort normal and breath sounds normal. No respiratory distress. She has no wheezes. She has no rales. She exhibits no tenderness.   Abdominal: Soft. Bowel sounds are normal. She exhibits no distension, no abdominal bruit and no mass. There is no splenomegaly or " hepatomegaly. There is no tenderness. There is no rebound and no guarding.   Musculoskeletal: Normal range of motion. She exhibits no edema, tenderness or deformity.   Lymphadenopathy:     She has no cervical adenopathy.   Neurological: She is alert. She has normal reflexes. No cranial nerve deficit. She exhibits normal muscle tone. Coordination normal.   Skin: Skin is warm and dry. No rash noted. She is not diaphoretic. No erythema.   Psychiatric: She has a normal mood and affect. Her behavior is normal. Judgment and thought content normal.         Lab Review  Lab Results   Component Value Date     11/07/2017    K 3.8 11/07/2017     (H) 11/07/2017    BUN 13 11/07/2017    CREATININE 0.89 11/07/2017    GLUCOSE 88 11/07/2017    CALCIUM 8.8 11/07/2017    ALT 24 10/23/2017    ALKPHOS 79 10/23/2017    LABIL2 1.2 (L) 10/23/2017     Lab Results   Component Value Date    CKTOTAL 76 06/12/2017     Lab Results   Component Value Date    WBC 6.84 10/23/2017    HGB 11.4 (L) 10/23/2017    HCT 35.9 (L) 10/23/2017     10/23/2017     No results found for: INR  Lab Results   Component Value Date    MG 1.9 10/23/2017     Lab Results   Component Value Date    TSH 2.989 10/18/2017     Lab Results   Component Value Date    BNP 18.0 10/17/2017     Lab Results   Component Value Date    CHLPL 116 03/23/2016    CHOL 122 06/12/2017    TRIG 97 06/12/2017    HDL 40 (L) 06/12/2017    LDLCALC 63 06/12/2017    VLDL 19.4 06/12/2017    LDLHDL 1.57 06/12/2017         Procedures       I personally viewed and interpreted the patient's LAB data         Assessment:     1. Essential hypertension    2. Mixed hyperlipidemia    3. Vitamin D deficiency*    4. Acquired hypothyroidism    5. Type 2 diabetes mellitus without complication, without long-term current use of insulin    6. Chronic cluster headache, not intractable    7. Senile dementia without behavioral disturbance    8. Anxiety          Plan:      Patient is in the nursing home  because of functional decline.  He is getting physical therapy and is getting somewhat better.  Patient has severe Alzheimer dementia and cannot give much history.  Patient is wheelchair confined.    From cardiac standpoint she is doing very well.  Blood pressure is controlled lipids have been controlled.  She denies any chest pain palpitations or shortness of breath.  No change in therapy was made.          No Follow-up on file.

## 2017-12-05 ENCOUNTER — PATIENT OUTREACH (OUTPATIENT)
Dept: CASE MANAGEMENT | Facility: OTHER | Age: 77
End: 2017-12-05

## 2017-12-05 NOTE — OUTREACH NOTE
Skilled Nursing Facility Discharge Flowsheet:     Skilled Nursing Facility Discharge Assessment 12/5/2017   Acute Facility Discharged From [place]   Acute Discharge Date 10/23/2017   Name of the Skilled Nursing Facility? [facility]   Tier Level of the Skilled Nursing Facility 2   Purpose of SNF Admission PT;OT   Estimated length of stay for the patient? 12/08/17   Who is the insurance provider or payor of patient stay? Medicare;Other (Comment)   Progression of Patient? Patient will discharge from therapy and go to LTC on 12/08/17

## 2017-12-08 RX ORDER — LISINOPRIL 40 MG/1
TABLET ORAL
Qty: 90 TABLET | Refills: 1 | OUTPATIENT
Start: 2017-12-08

## 2017-12-13 ENCOUNTER — PATIENT OUTREACH (OUTPATIENT)
Dept: CASE MANAGEMENT | Facility: OTHER | Age: 77
End: 2017-12-13

## 2017-12-13 NOTE — OUTREACH NOTE
Skilled Nursing Facility Discharge Flowsheet:     Skilled Nursing Facility Discharge Assessment 12/13/2017   Acute Facility Discharged From [place]   Acute Discharge Date 10/23/2017   Name of the Skilled Nursing Facility? [facility]   Tier Level of the Skilled Nursing Facility 2   Purpose of SNF Admission PT;OT   Estimated length of stay for the patient? 12/08/17   Who is the insurance provider or payor of patient stay? Medicare;Part B   Progression of Patient? Patient discharged from therapy and go to LTC on 12/08/17   Skilled Nursing Discharge Date? 12/8/2017   Where was the patient discharged to? LTC

## 2018-01-08 ENCOUNTER — TRANSCRIBE ORDERS (OUTPATIENT)
Dept: ADMINISTRATIVE | Facility: HOSPITAL | Age: 78
End: 2018-01-08

## 2018-01-08 DIAGNOSIS — G44.52 NEW DAILY PERSISTENT HEADACHE: Primary | ICD-10-CM

## 2018-01-12 ENCOUNTER — HOSPITAL ENCOUNTER (OUTPATIENT)
Dept: CT IMAGING | Facility: HOSPITAL | Age: 78
Discharge: HOME | End: 2018-01-12
Attending: INTERNAL MEDICINE | Admitting: INTERNAL MEDICINE

## 2018-01-12 DIAGNOSIS — G44.52 NEW DAILY PERSISTENT HEADACHE: ICD-10-CM

## 2018-01-12 PROCEDURE — 70450 CT HEAD/BRAIN W/O DYE: CPT

## 2018-01-12 PROCEDURE — 70450 CT HEAD/BRAIN W/O DYE: CPT | Performed by: RADIOLOGY

## 2018-02-13 ENCOUNTER — OFFICE VISIT (OUTPATIENT)
Dept: PSYCHIATRY | Facility: CLINIC | Age: 78
End: 2018-02-13

## 2018-02-13 DIAGNOSIS — F03.90 DEMENTIA WITHOUT BEHAVIORAL DISTURBANCE, UNSPECIFIED DEMENTIA TYPE: ICD-10-CM

## 2018-02-13 DIAGNOSIS — F33.1 MAJOR DEPRESSIVE DISORDER, RECURRENT EPISODE, MODERATE (HCC): Primary | ICD-10-CM

## 2018-02-13 PROCEDURE — 99213 OFFICE O/P EST LOW 20 MIN: CPT | Performed by: NURSE PRACTITIONER

## 2018-02-13 NOTE — PROGRESS NOTES
Subjective   Luz Beasley is a 77 y.o. female is here today for medication management follow-up at the [facility], she presents to her appointment about 45 minutes late. She presents with her nurse aid from the nursing home.    Chief Complaint: Follow-up for depression     History of Present Illness Patient continues to stay in nursing home due to a stroke in October. She states that she is tired but states that she is sleeping okay, nurse aid is unable to report how well the patient is sleeping. Patient denies any nightmares. She reports that a doctor continues to see her in the nursing home but she is no longer able to remember names. She reports to be taking her medications with no side effects but does feel sad for the majority of the day. The nurse aide present with her does report she cries on a daily basis with the winter being hard on her and her not having as many visitors. She does report that anxiety onsets before the crying episodes. She reports that her appetite is well but was unable to be weighted today due to being WC bound. Patient and staff denies any AV hallucinations. Denies any SI/HI. She is unable to recall children names and time but is alert to person and place. She is unable to recall why she is being seen by this provider. Denies any new health issues.     The following portions of the patient's history were reviewed and updated as appropriate: allergies, current medications, past family history, past medical history, past social history, past surgical history and problem list.    Review of Systems   Constitutional: Negative for appetite change, chills, diaphoresis, fatigue, fever and unexpected weight change.   HENT: Negative for hearing loss, sore throat, trouble swallowing and voice change.    Eyes: Negative for photophobia and visual disturbance.   Respiratory: Negative for cough, chest tightness and shortness of breath.    Cardiovascular: Negative for chest pain and palpitations.    Gastrointestinal: Negative for abdominal pain, constipation, nausea and vomiting.   Endocrine: Negative for cold intolerance and heat intolerance.   Genitourinary: Negative for dysuria and frequency.   Musculoskeletal: Positive for gait problem. Negative for arthralgias, back pain, joint swelling and neck stiffness.   Skin: Negative for color change and wound.   Allergic/Immunologic: Negative for environmental allergies and immunocompromised state.   Neurological: Negative for dizziness, tremors, seizures, syncope, weakness, light-headedness and headaches.   Hematological: Negative for adenopathy. Does not bruise/bleed easily.       Objective   Physical Exam   Constitutional: She appears well-developed and well-nourished. No distress.   Neurological: She is alert. Coordination and gait normal.   Vitals reviewed.    There were no vitals taken for this visit.    Medication List:   Current Outpatient Prescriptions   Medication Sig Dispense Refill   • atorvastatin (LIPITOR) 80 MG tablet Take 0.5 tablets by mouth Daily. (Patient taking differently: Take 80 mg by mouth Every Night.) 90 tablet 2   • clonazePAM (KlonoPIN) 0.5 MG tablet Take 1 tablet by mouth 2 (Two) Times a Day As Needed for Anxiety. 10 tablet 0   • ertapenem (INVanz) 1 g/100 mL 0.9% NS VTB (mbp) Infuse 100 mL into a venous catheter Daily. Indications: Infection of Blood or Tissues affecting the Whole Body, Urinary Tract Infection 700 mL 0   • insulin aspart (novoLOG) 100 UNIT/ML injection Inject 0-7 Units under the skin 4 (Four) Times a Day Before Meals & at Bedtime. 100 mL 0   • levothyroxine (LEVOTHROID) 25 MCG tablet Take 1 tablet by mouth Daily. (Patient taking differently: Take 25 mcg by mouth Every Night.) 90 tablet 2   • megestrol (MEGACE) 40 MG/ML suspension Take 10 mL by mouth 2 (Two) Times a Day. 600 mL 0   • Melatonin 3 MG tablet dispersible Take 1 tablet by mouth Every Night.     • metFORMIN (GLUCOPHAGE) 500 MG tablet Take 0.5 tablets by  mouth Daily With Breakfast. (Patient taking differently: Take 250 mg by mouth Every Night.) 45 tablet 2   • NAMZARIC 28-10 MG capsule sustained-release 24 hr Take 1 tablet by mouth Every Night.     • Vortioxetine HBr 20 MG tablet Take 20 mg by mouth Daily. 30 tablet 0     No current facility-administered medications for this visit.        Mental Status Exam:   Hygiene:   fair  Cooperation:  Cooperative  Eye Contact:  Fair  Psychomotor Behavior:  Appropriate  Affect:  Blunted  Hopelessness: Denies  Speech:  Minimal  Thought Process:  Goal directed and Linear  Thought Content:  Normal  Suicidal:  None  Homicidal:  None  Hallucinations:  None  Delusion:  None  Memory:  Deficits  Orientation:  Person, Place and Situation  Reliability:  fair  Insight:  Fair  Judgement:  Fair  Impulse Control:  Fair  Physical/Medical Issues:  No     Assessment/Plan   Diagnoses and all orders for this visit:    Major depressive disorder, recurrent episode, moderate    Dementia without behavioral disturbance, unspecified dementia type    Other orders  - Vortioxetine HBr (TRINTELLIX) 20 MG tablet; Take 20 mg by mouth Daily With Breakfast.    Discussed medication options. She is currently being  prescribed medications through Dr Bui while at the Nursing Home.  Reviewed the risks, benefits, and side effects of the medications; patient acknowledged and verbally consented.  Patient is agreeable to call the [facility].  Patient is aware to call 911 or go to the nearest ER should begin having SI/HI.     Follow up in 16 weeks

## 2018-02-19 ENCOUNTER — EPISODE CHANGES (OUTPATIENT)
Dept: CASE MANAGEMENT | Facility: OTHER | Age: 78
End: 2018-02-19

## 2018-03-21 ENCOUNTER — LAB REQUISITION (OUTPATIENT)
Dept: LAB | Facility: HOSPITAL | Age: 78
End: 2018-03-21

## 2018-03-21 DIAGNOSIS — N39.0 URINARY TRACT INFECTION: ICD-10-CM

## 2018-03-21 DIAGNOSIS — E11.9 TYPE 2 DIABETES MELLITUS WITHOUT COMPLICATIONS (HCC): ICD-10-CM

## 2018-03-21 DIAGNOSIS — E03.9 HYPOTHYROIDISM: ICD-10-CM

## 2018-03-21 LAB
ALBUMIN SERPL-MCNC: 3.9 G/DL (ref 3.4–4.8)
ALBUMIN/GLOB SERPL: 1.4 G/DL (ref 1.5–2.5)
ALP SERPL-CCNC: 93 U/L (ref 35–104)
ALT SERPL W P-5'-P-CCNC: 19 U/L (ref 10–36)
ANION GAP SERPL CALCULATED.3IONS-SCNC: 5.7 MMOL/L (ref 3.6–11.2)
AST SERPL-CCNC: 19 U/L (ref 10–30)
BACTERIA UR QL AUTO: ABNORMAL /HPF
BASOPHILS # BLD AUTO: 0.01 10*3/MM3 (ref 0–0.3)
BASOPHILS NFR BLD AUTO: 0.1 % (ref 0–2)
BILIRUB SERPL-MCNC: 0.7 MG/DL (ref 0.2–1.8)
BILIRUB UR QL STRIP: NEGATIVE
BUN BLD-MCNC: 22 MG/DL (ref 7–21)
BUN/CREAT SERPL: 20.4 (ref 7–25)
CALCIUM SPEC-SCNC: 9.8 MG/DL (ref 7.7–10)
CHLORIDE SERPL-SCNC: 113 MMOL/L (ref 99–112)
CHOLEST SERPL-MCNC: 113 MG/DL (ref 0–200)
CLARITY UR: ABNORMAL
CO2 SERPL-SCNC: 24.3 MMOL/L (ref 24.3–31.9)
COLOR UR: YELLOW
CREAT BLD-MCNC: 1.08 MG/DL (ref 0.43–1.29)
DEPRECATED RDW RBC AUTO: 44 FL (ref 37–54)
EOSINOPHIL # BLD AUTO: 0.22 10*3/MM3 (ref 0–0.7)
EOSINOPHIL NFR BLD AUTO: 2.8 % (ref 0–7)
ERYTHROCYTE [DISTWIDTH] IN BLOOD BY AUTOMATED COUNT: 13.5 % (ref 11.5–14.5)
GFR SERPL CREATININE-BSD FRML MDRD: 49 ML/MIN/1.73
GLOBULIN UR ELPH-MCNC: 2.8 GM/DL
GLUCOSE BLD-MCNC: 112 MG/DL (ref 70–110)
GLUCOSE UR STRIP-MCNC: NEGATIVE MG/DL
HBA1C MFR BLD: 7 % (ref 4.5–5.7)
HCT VFR BLD AUTO: 40.5 % (ref 37–47)
HDLC SERPL-MCNC: 32 MG/DL (ref 60–100)
HGB BLD-MCNC: 13.3 G/DL (ref 12–16)
HGB UR QL STRIP.AUTO: ABNORMAL
HYALINE CASTS UR QL AUTO: ABNORMAL /LPF
IMM GRANULOCYTES # BLD: 0.02 10*3/MM3 (ref 0–0.03)
IMM GRANULOCYTES NFR BLD: 0.3 % (ref 0–0.5)
KETONES UR QL STRIP: NEGATIVE
LDLC SERPL CALC-MCNC: 65 MG/DL (ref 0–100)
LDLC/HDLC SERPL: 2.04 {RATIO}
LEUKOCYTE ESTERASE UR QL STRIP.AUTO: ABNORMAL
LYMPHOCYTES # BLD AUTO: 2.7 10*3/MM3 (ref 1–3)
LYMPHOCYTES NFR BLD AUTO: 34.9 % (ref 16–46)
MCH RBC QN AUTO: 29.7 PG (ref 27–33)
MCHC RBC AUTO-ENTMCNC: 32.8 G/DL (ref 33–37)
MCV RBC AUTO: 90.4 FL (ref 80–94)
MONOCYTES # BLD AUTO: 0.5 10*3/MM3 (ref 0.1–0.9)
MONOCYTES NFR BLD AUTO: 6.5 % (ref 0–12)
NEUTROPHILS # BLD AUTO: 4.28 10*3/MM3 (ref 1.4–6.5)
NEUTROPHILS NFR BLD AUTO: 55.4 % (ref 40–75)
NITRITE UR QL STRIP: POSITIVE
OSMOLALITY SERPL CALC.SUM OF ELEC: 289.1 MOSM/KG (ref 273–305)
PH UR STRIP.AUTO: 5.5 [PH] (ref 5–8)
PLATELET # BLD AUTO: 194 10*3/MM3 (ref 130–400)
PMV BLD AUTO: 10.6 FL (ref 6–10)
POTASSIUM BLD-SCNC: 3.8 MMOL/L (ref 3.5–5.3)
PROT SERPL-MCNC: 6.7 G/DL (ref 6–8)
PROT UR QL STRIP: ABNORMAL
RBC # BLD AUTO: 4.48 10*6/MM3 (ref 4.2–5.4)
RBC # UR: ABNORMAL /HPF
REF LAB TEST METHOD: ABNORMAL
SODIUM BLD-SCNC: 143 MMOL/L (ref 135–153)
SP GR UR STRIP: 1.03 (ref 1–1.03)
SQUAMOUS #/AREA URNS HPF: ABNORMAL /HPF
TRIGL SERPL-MCNC: 79 MG/DL (ref 0–150)
TSH SERPL DL<=0.05 MIU/L-ACNC: 1.22 MIU/ML (ref 0.55–4.78)
UROBILINOGEN UR QL STRIP: ABNORMAL
VLDLC SERPL-MCNC: 15.8 MG/DL
WBC NRBC COR # BLD: 7.73 10*3/MM3 (ref 4.5–12.5)
WBC UR QL AUTO: ABNORMAL /HPF

## 2018-03-21 PROCEDURE — 83036 HEMOGLOBIN GLYCOSYLATED A1C: CPT | Performed by: INTERNAL MEDICINE

## 2018-03-21 PROCEDURE — 87186 SC STD MICRODIL/AGAR DIL: CPT | Performed by: INTERNAL MEDICINE

## 2018-03-21 PROCEDURE — 84443 ASSAY THYROID STIM HORMONE: CPT | Performed by: INTERNAL MEDICINE

## 2018-03-21 PROCEDURE — 87077 CULTURE AEROBIC IDENTIFY: CPT | Performed by: INTERNAL MEDICINE

## 2018-03-21 PROCEDURE — 81001 URINALYSIS AUTO W/SCOPE: CPT | Performed by: INTERNAL MEDICINE

## 2018-03-21 PROCEDURE — 87086 URINE CULTURE/COLONY COUNT: CPT | Performed by: INTERNAL MEDICINE

## 2018-03-21 PROCEDURE — 85025 COMPLETE CBC W/AUTO DIFF WBC: CPT | Performed by: INTERNAL MEDICINE

## 2018-03-21 PROCEDURE — 80061 LIPID PANEL: CPT | Performed by: INTERNAL MEDICINE

## 2018-03-21 PROCEDURE — 80053 COMPREHEN METABOLIC PANEL: CPT | Performed by: INTERNAL MEDICINE

## 2018-03-24 LAB
BACTERIA SPEC AEROBE CULT: ABNORMAL
BACTERIA SPEC AEROBE CULT: ABNORMAL

## 2018-04-21 ENCOUNTER — LAB REQUISITION (OUTPATIENT)
Dept: LAB | Facility: HOSPITAL | Age: 78
End: 2018-04-21

## 2018-04-21 DIAGNOSIS — N39.0 URINARY TRACT INFECTION: ICD-10-CM

## 2018-04-21 LAB
BACTERIA UR QL AUTO: ABNORMAL /HPF
BILIRUB UR QL STRIP: NEGATIVE
CLARITY UR: ABNORMAL
COLOR UR: ABNORMAL
GLUCOSE UR STRIP-MCNC: NEGATIVE MG/DL
HGB UR QL STRIP.AUTO: ABNORMAL
HYALINE CASTS UR QL AUTO: ABNORMAL /LPF
KETONES UR QL STRIP: ABNORMAL
LEUKOCYTE ESTERASE UR QL STRIP.AUTO: ABNORMAL
NITRITE UR QL STRIP: POSITIVE
PH UR STRIP.AUTO: 6 [PH] (ref 5–8)
PROT UR QL STRIP: ABNORMAL
RBC # UR: ABNORMAL /HPF
REF LAB TEST METHOD: ABNORMAL
SP GR UR STRIP: >1.03 (ref 1–1.03)
SQUAMOUS #/AREA URNS HPF: ABNORMAL /HPF
UROBILINOGEN UR QL STRIP: ABNORMAL
WBC UR QL AUTO: ABNORMAL /HPF

## 2018-04-21 PROCEDURE — 87186 SC STD MICRODIL/AGAR DIL: CPT | Performed by: INTERNAL MEDICINE

## 2018-04-21 PROCEDURE — 87077 CULTURE AEROBIC IDENTIFY: CPT | Performed by: INTERNAL MEDICINE

## 2018-04-21 PROCEDURE — 81001 URINALYSIS AUTO W/SCOPE: CPT | Performed by: INTERNAL MEDICINE

## 2018-04-21 PROCEDURE — 87086 URINE CULTURE/COLONY COUNT: CPT | Performed by: INTERNAL MEDICINE

## 2018-04-23 LAB
BACTERIA SPEC AEROBE CULT: ABNORMAL
BACTERIA SPEC AEROBE CULT: ABNORMAL

## 2018-06-21 ENCOUNTER — TRANSCRIBE ORDERS (OUTPATIENT)
Dept: ADMINISTRATIVE | Facility: HOSPITAL | Age: 78
End: 2018-06-21

## 2018-06-21 DIAGNOSIS — G44.52 NEW DAILY PERSISTENT HEADACHE: Primary | ICD-10-CM

## 2018-07-02 ENCOUNTER — HOSPITAL ENCOUNTER (OUTPATIENT)
Dept: CT IMAGING | Facility: HOSPITAL | Age: 78
Discharge: HOME | End: 2018-07-02
Attending: INTERNAL MEDICINE | Admitting: INTERNAL MEDICINE

## 2018-07-02 DIAGNOSIS — G44.52 NEW DAILY PERSISTENT HEADACHE: ICD-10-CM

## 2018-07-02 PROCEDURE — 25010000002 IOPAMIDOL 61 % SOLUTION: Performed by: INTERNAL MEDICINE

## 2018-07-02 PROCEDURE — 70460 CT HEAD/BRAIN W/DYE: CPT | Performed by: RADIOLOGY

## 2018-07-02 PROCEDURE — 82565 ASSAY OF CREATININE: CPT

## 2018-07-02 PROCEDURE — 70460 CT HEAD/BRAIN W/DYE: CPT

## 2018-07-02 RX ADMIN — IOPAMIDOL 100 ML: 612 INJECTION, SOLUTION INTRAVENOUS at 10:30

## 2018-07-10 LAB — CREAT BLDA-MCNC: 1.1 MG/DL (ref 0.6–1.3)

## 2018-07-31 ENCOUNTER — OFFICE VISIT (OUTPATIENT)
Dept: PSYCHIATRY | Facility: CLINIC | Age: 78
End: 2018-07-31

## 2018-07-31 VITALS — HEIGHT: 66 IN

## 2018-07-31 DIAGNOSIS — F33.1 MAJOR DEPRESSIVE DISORDER, RECURRENT EPISODE, MODERATE (HCC): Primary | ICD-10-CM

## 2018-07-31 DIAGNOSIS — F03.90 DEMENTIA WITHOUT BEHAVIORAL DISTURBANCE, UNSPECIFIED DEMENTIA TYPE: ICD-10-CM

## 2018-07-31 PROCEDURE — 99213 OFFICE O/P EST LOW 20 MIN: CPT | Performed by: NURSE PRACTITIONER

## 2018-07-31 RX ORDER — LORAZEPAM 0.5 MG/1
0.5 TABLET ORAL EVERY 12 HOURS
COMMUNITY

## 2018-07-31 RX ORDER — LAMOTRIGINE 25 MG/1
50 TABLET ORAL 2 TIMES DAILY
COMMUNITY
End: 2021-12-17

## 2018-07-31 NOTE — PROGRESS NOTES
Subjective   Luz Beasley is a 78 y.o. female is here today for medication management follow-up at the [facility], she presents to her appointment about 45 minutes late. She presents with her nurse aid from the nursing home.    Chief Complaint: Follow-up for depression     History of Present Illness  She states that she is tired today because she has been running around.  She is having a bad headache and ready to go back to [facility].  She denies any problems with being depressed and no problems with anxiety.  It is reported by staff she sleeps a lot of the time and has a headache a lot of the time.  Her memory is poor and is unable to identify who her children are or when she last ate.  Reported by staff that she has been eating ok with no problem but was unable to be weighed today due to being WC bound.  She has not been sick or on any antibiotics recently.  Family visits on occasion.  No evidence of psychosis noted, patient and staff denies any response to AV hallucinations. Denies any SI/HI. She is unable to recall children names and time but is alert to person and place. She is unable to recall why she is being seen by this provider.    Will continue the Trintellix, however will leave it up to Dr De La Vega to reschedule an appointment since she is being seen in the nursing home on a regular basis.      The following portions of the patient's history were reviewed and updated as appropriate: allergies, current medications, past family history, past medical history, past social history, past surgical history and problem list.    Review of Systems   Constitutional: Negative for appetite change, chills, diaphoresis, fatigue, fever and unexpected weight change.   HENT: Negative for hearing loss, sore throat, trouble swallowing and voice change.    Eyes: Negative for photophobia and visual disturbance.   Respiratory: Negative for cough, chest tightness and shortness of breath.    Cardiovascular: Negative  "for chest pain and palpitations.   Gastrointestinal: Negative for abdominal pain, constipation, nausea and vomiting.   Endocrine: Negative for cold intolerance and heat intolerance.   Genitourinary: Negative for dysuria and frequency.   Musculoskeletal: Positive for gait problem. Negative for arthralgias, back pain, joint swelling and neck stiffness.   Skin: Negative for color change and wound.   Allergic/Immunologic: Negative for environmental allergies and immunocompromised state.   Neurological: Negative for dizziness, tremors, seizures, syncope, weakness, light-headedness and headaches.   Hematological: Negative for adenopathy. Does not bruise/bleed easily.       Objective   Physical Exam   Constitutional: She appears well-developed and well-nourished. No distress.   Neurological: She is alert. Coordination and gait normal.   Vitals reviewed.    Height 167.6 cm (65.98\").    Medication List:   Current Outpatient Prescriptions   Medication Sig Dispense Refill   • atorvastatin (LIPITOR) 80 MG tablet Take 0.5 tablets by mouth Daily. (Patient taking differently: Take 80 mg by mouth Every Night.) 90 tablet 2   • insulin aspart (novoLOG) 100 UNIT/ML injection Inject 0-7 Units under the skin 4 (Four) Times a Day Before Meals & at Bedtime. 100 mL 0   • lamoTRIgine (LaMICtal) 25 MG tablet Take 50 mg by mouth 2 (Two) Times a Day.     • levothyroxine (LEVOTHROID) 25 MCG tablet Take 1 tablet by mouth Daily. (Patient taking differently: Take 25 mcg by mouth Every Night.) 90 tablet 2   • LORazepam (ATIVAN) 0.5 MG tablet Take 0.5 mg by mouth Every 12 (Twelve) Hours.     • Melatonin 3 MG tablet dispersible Take 1 tablet by mouth Every Night.     • metFORMIN (GLUCOPHAGE) 500 MG tablet Take 0.5 tablets by mouth Daily With Breakfast. (Patient taking differently: Take 250 mg by mouth Every Night.) 45 tablet 2   • NAMZARIC 28-10 MG capsule sustained-release 24 hr Take 1 tablet by mouth Every Night.     • Vortioxetine HBr (TRINTELLIX) " 20 MG tablet Take 20 mg by mouth Daily. 30 tablet 2     No current facility-administered medications for this visit.        Mental Status Exam:   Hygiene:   fair  Cooperation:  Cooperative  Eye Contact:  Fair  Psychomotor Behavior:  Appropriate  Affect:  Blunted  Hopelessness: Denies  Speech:  Minimal  Thought Process:  Goal directed and Linear  Thought Content:  Normal  Suicidal:  None  Homicidal:  None  Hallucinations:  None  Delusion:  None  Memory:  Deficits  Orientation:  Person, Place and Situation  Reliability:  fair  Insight:  Fair  Judgement:  Fair  Impulse Control:  Fair  Physical/Medical Issues:  No     Assessment/Plan   Diagnoses and all orders for this visit:    Major depressive disorder, recurrent episode, moderate    Dementia without behavioral disturbance, unspecified dementia type    Other orders  - Vortioxetine HBr (TRINTELLIX) 20 MG tablet; Take 20 mg by mouth Daily With Breakfast.    Discussed medication options. She is currently being  prescribed medications through Dr Bui while at the Nursing Home.  Reviewed the risks, benefits, and side effects of the medications; patient acknowledged and verbally consented.  Patient is agreeable to call the [facility].  Patient is aware to call 911 or go to the nearest ER should begin having SI/HI.     Follow up in 16 weeks

## 2018-11-28 ENCOUNTER — LAB REQUISITION (OUTPATIENT)
Dept: LAB | Facility: HOSPITAL | Age: 78
End: 2018-11-28

## 2018-11-28 DIAGNOSIS — L08.9 LOCAL INFECTION OF SKIN AND SUBCUTANEOUS TISSUE: ICD-10-CM

## 2018-11-28 PROCEDURE — 87077 CULTURE AEROBIC IDENTIFY: CPT | Performed by: INTERNAL MEDICINE

## 2018-11-28 PROCEDURE — 87186 SC STD MICRODIL/AGAR DIL: CPT | Performed by: INTERNAL MEDICINE

## 2018-11-28 PROCEDURE — 87205 SMEAR GRAM STAIN: CPT | Performed by: INTERNAL MEDICINE

## 2018-11-28 PROCEDURE — 87070 CULTURE OTHR SPECIMN AEROBIC: CPT | Performed by: INTERNAL MEDICINE

## 2018-12-02 LAB
BACTERIA SPEC AEROBE CULT: ABNORMAL
GRAM STN SPEC: ABNORMAL

## 2018-12-20 ENCOUNTER — LAB REQUISITION (OUTPATIENT)
Dept: LAB | Facility: HOSPITAL | Age: 78
End: 2018-12-20

## 2018-12-20 DIAGNOSIS — N39.0 URINARY TRACT INFECTION: ICD-10-CM

## 2018-12-20 LAB
BACTERIA UR QL AUTO: ABNORMAL /HPF
BILIRUB UR QL STRIP: NEGATIVE
CLARITY UR: ABNORMAL
COLOR UR: ABNORMAL
FINE GRAN CASTS URNS QL MICRO: ABNORMAL /LPF
GLUCOSE UR STRIP-MCNC: NEGATIVE MG/DL
HGB UR QL STRIP.AUTO: ABNORMAL
HYALINE CASTS UR QL AUTO: ABNORMAL /LPF
KETONES UR QL STRIP: ABNORMAL
LEUKOCYTE ESTERASE UR QL STRIP.AUTO: ABNORMAL
NITRITE UR QL STRIP: POSITIVE
PH UR STRIP.AUTO: 5.5 [PH] (ref 5–8)
PROT UR QL STRIP: ABNORMAL
RBC # UR: ABNORMAL /HPF
REF LAB TEST METHOD: ABNORMAL
SP GR UR STRIP: 1.03 (ref 1–1.03)
SQUAMOUS #/AREA URNS HPF: ABNORMAL /HPF
UROBILINOGEN UR QL STRIP: ABNORMAL
WBC UR QL AUTO: ABNORMAL /HPF

## 2018-12-20 PROCEDURE — 81001 URINALYSIS AUTO W/SCOPE: CPT | Performed by: INTERNAL MEDICINE

## 2018-12-20 PROCEDURE — 87086 URINE CULTURE/COLONY COUNT: CPT | Performed by: INTERNAL MEDICINE

## 2018-12-20 PROCEDURE — 87186 SC STD MICRODIL/AGAR DIL: CPT | Performed by: INTERNAL MEDICINE

## 2018-12-20 PROCEDURE — 87077 CULTURE AEROBIC IDENTIFY: CPT | Performed by: INTERNAL MEDICINE

## 2018-12-23 LAB
BACTERIA SPEC AEROBE CULT: ABNORMAL

## 2019-01-09 ENCOUNTER — LAB REQUISITION (OUTPATIENT)
Dept: LAB | Facility: HOSPITAL | Age: 79
End: 2019-01-09

## 2019-01-09 DIAGNOSIS — N39.0 URINARY TRACT INFECTION: ICD-10-CM

## 2019-01-09 LAB
BACTERIA UR QL AUTO: ABNORMAL /HPF
BILIRUB UR QL STRIP: NEGATIVE
CLARITY UR: CLEAR
COLOR UR: YELLOW
GLUCOSE UR STRIP-MCNC: NEGATIVE MG/DL
HGB UR QL STRIP.AUTO: NEGATIVE
HYALINE CASTS UR QL AUTO: ABNORMAL /LPF
KETONES UR QL STRIP: NEGATIVE
LEUKOCYTE ESTERASE UR QL STRIP.AUTO: ABNORMAL
NITRITE UR QL STRIP: NEGATIVE
PH UR STRIP.AUTO: 6 [PH] (ref 5–8)
PROT UR QL STRIP: NEGATIVE
RBC # UR: ABNORMAL /HPF
REF LAB TEST METHOD: ABNORMAL
SP GR UR STRIP: 1.02 (ref 1–1.03)
SQUAMOUS #/AREA URNS HPF: ABNORMAL /HPF
UROBILINOGEN UR QL STRIP: ABNORMAL
WBC UR QL AUTO: ABNORMAL /HPF

## 2019-01-09 PROCEDURE — 87086 URINE CULTURE/COLONY COUNT: CPT | Performed by: INTERNAL MEDICINE

## 2019-01-09 PROCEDURE — 81001 URINALYSIS AUTO W/SCOPE: CPT | Performed by: INTERNAL MEDICINE

## 2019-01-11 LAB — BACTERIA SPEC AEROBE CULT: NO GROWTH

## 2019-05-16 NOTE — TELEPHONE ENCOUNTER
Problem: Adult Inpatient Plan of Care  Goal: Plan of Care Review  Outcome: Ongoing (interventions implemented as appropriate)  Pt and sitter agree with plan of care. Vs stable. No complaint of pain noted.  Sitter at bedside.  Iv antibiotics given as ordered.  Right PICC line Intact.  Call bell in reach.  Will continue to monitor.         RX REQUST

## 2020-03-12 ENCOUNTER — LAB REQUISITION (OUTPATIENT)
Dept: LAB | Facility: HOSPITAL | Age: 80
End: 2020-03-12

## 2020-03-12 DIAGNOSIS — R30.0 DYSURIA: ICD-10-CM

## 2020-03-12 LAB
BACTERIA UR QL AUTO: ABNORMAL /HPF
BASOPHILS # BLD AUTO: 0.03 10*3/MM3 (ref 0–0.2)
BASOPHILS NFR BLD AUTO: 0.3 % (ref 0–1.5)
BILIRUB UR QL STRIP: NEGATIVE
CLARITY UR: ABNORMAL
COLOR UR: YELLOW
DEPRECATED RDW RBC AUTO: 48.9 FL (ref 37–54)
EOSINOPHIL # BLD AUTO: 0.11 10*3/MM3 (ref 0–0.4)
EOSINOPHIL NFR BLD AUTO: 1.2 % (ref 0.3–6.2)
ERYTHROCYTE [DISTWIDTH] IN BLOOD BY AUTOMATED COUNT: 14.2 % (ref 12.3–15.4)
GLUCOSE UR STRIP-MCNC: NEGATIVE MG/DL
HCT VFR BLD AUTO: 40 % (ref 34–46.6)
HGB BLD-MCNC: 12.4 G/DL (ref 12–15.9)
HGB UR QL STRIP.AUTO: ABNORMAL
HYALINE CASTS UR QL AUTO: ABNORMAL /LPF
IMM GRANULOCYTES # BLD AUTO: 0.03 10*3/MM3 (ref 0–0.05)
IMM GRANULOCYTES NFR BLD AUTO: 0.3 % (ref 0–0.5)
KETONES UR QL STRIP: NEGATIVE
LEUKOCYTE ESTERASE UR QL STRIP.AUTO: ABNORMAL
LYMPHOCYTES # BLD AUTO: 1.66 10*3/MM3 (ref 0.7–3.1)
LYMPHOCYTES NFR BLD AUTO: 17.9 % (ref 19.6–45.3)
MCH RBC QN AUTO: 29.1 PG (ref 26.6–33)
MCHC RBC AUTO-ENTMCNC: 31 G/DL (ref 31.5–35.7)
MCV RBC AUTO: 93.9 FL (ref 79–97)
MONOCYTES # BLD AUTO: 0.55 10*3/MM3 (ref 0.1–0.9)
MONOCYTES NFR BLD AUTO: 5.9 % (ref 5–12)
NEUTROPHILS # BLD AUTO: 6.9 10*3/MM3 (ref 1.7–7)
NEUTROPHILS NFR BLD AUTO: 74.4 % (ref 42.7–76)
NITRITE UR QL STRIP: NEGATIVE
NRBC BLD AUTO-RTO: 0 /100 WBC (ref 0–0.2)
PH UR STRIP.AUTO: 5.5 [PH] (ref 5–8)
PLATELET # BLD AUTO: 176 10*3/MM3 (ref 140–450)
PMV BLD AUTO: 10.4 FL (ref 6–12)
PROT UR QL STRIP: ABNORMAL
RBC # BLD AUTO: 4.26 10*6/MM3 (ref 3.77–5.28)
RBC # UR: ABNORMAL /HPF
REF LAB TEST METHOD: ABNORMAL
SP GR UR STRIP: 1.03 (ref 1–1.03)
SQUAMOUS #/AREA URNS HPF: ABNORMAL /HPF
UROBILINOGEN UR QL STRIP: ABNORMAL
WBC NRBC COR # BLD: 9.28 10*3/MM3 (ref 3.4–10.8)
WBC UR QL AUTO: ABNORMAL /HPF

## 2020-03-12 PROCEDURE — 87186 SC STD MICRODIL/AGAR DIL: CPT | Performed by: INTERNAL MEDICINE

## 2020-03-12 PROCEDURE — 87086 URINE CULTURE/COLONY COUNT: CPT | Performed by: INTERNAL MEDICINE

## 2020-03-12 PROCEDURE — 85025 COMPLETE CBC W/AUTO DIFF WBC: CPT | Performed by: INTERNAL MEDICINE

## 2020-03-12 PROCEDURE — 87077 CULTURE AEROBIC IDENTIFY: CPT | Performed by: INTERNAL MEDICINE

## 2020-03-12 PROCEDURE — 81001 URINALYSIS AUTO W/SCOPE: CPT | Performed by: INTERNAL MEDICINE

## 2020-03-14 LAB — BACTERIA SPEC AEROBE CULT: ABNORMAL

## 2020-03-19 ENCOUNTER — LAB REQUISITION (OUTPATIENT)
Dept: LAB | Facility: HOSPITAL | Age: 80
End: 2020-03-19

## 2020-03-19 DIAGNOSIS — R05.9 COUGH: ICD-10-CM

## 2020-03-19 DIAGNOSIS — R50.9 FEVER, UNSPECIFIED: ICD-10-CM

## 2020-03-19 LAB
BASOPHILS # BLD AUTO: 0.04 10*3/MM3 (ref 0–0.2)
BASOPHILS NFR BLD AUTO: 0.6 % (ref 0–1.5)
DEPRECATED RDW RBC AUTO: 49.9 FL (ref 37–54)
EOSINOPHIL # BLD AUTO: 0.27 10*3/MM3 (ref 0–0.4)
EOSINOPHIL NFR BLD AUTO: 4.1 % (ref 0.3–6.2)
ERYTHROCYTE [DISTWIDTH] IN BLOOD BY AUTOMATED COUNT: 15 % (ref 12.3–15.4)
FLUAV AG NPH QL: NEGATIVE
FLUBV AG NPH QL IA: NEGATIVE
HCT VFR BLD AUTO: 44.6 % (ref 34–46.6)
HGB BLD-MCNC: 13.9 G/DL (ref 12–15.9)
IMM GRANULOCYTES # BLD AUTO: 0.03 10*3/MM3 (ref 0–0.05)
IMM GRANULOCYTES NFR BLD AUTO: 0.5 % (ref 0–0.5)
LYMPHOCYTES # BLD AUTO: 1.94 10*3/MM3 (ref 0.7–3.1)
LYMPHOCYTES NFR BLD AUTO: 29.8 % (ref 19.6–45.3)
MCH RBC QN AUTO: 28.4 PG (ref 26.6–33)
MCHC RBC AUTO-ENTMCNC: 31.2 G/DL (ref 31.5–35.7)
MCV RBC AUTO: 91.2 FL (ref 79–97)
MONOCYTES # BLD AUTO: 0.64 10*3/MM3 (ref 0.1–0.9)
MONOCYTES NFR BLD AUTO: 9.8 % (ref 5–12)
NEUTROPHILS # BLD AUTO: 3.6 10*3/MM3 (ref 1.7–7)
NEUTROPHILS NFR BLD AUTO: 55.2 % (ref 42.7–76)
NRBC BLD AUTO-RTO: 0 /100 WBC (ref 0–0.2)
PLATELET # BLD AUTO: 165 10*3/MM3 (ref 140–450)
PMV BLD AUTO: 10.1 FL (ref 6–12)
RBC # BLD AUTO: 4.89 10*6/MM3 (ref 3.77–5.28)
WBC NRBC COR # BLD: 6.52 10*3/MM3 (ref 3.4–10.8)

## 2020-03-19 PROCEDURE — 85025 COMPLETE CBC W/AUTO DIFF WBC: CPT | Performed by: INTERNAL MEDICINE

## 2020-03-19 PROCEDURE — 87804 INFLUENZA ASSAY W/OPTIC: CPT | Performed by: INTERNAL MEDICINE

## 2020-03-23 ENCOUNTER — HOSPITAL ENCOUNTER (EMERGENCY)
Facility: HOSPITAL | Age: 80
Discharge: HOME | End: 2020-03-23
Attending: EMERGENCY MEDICINE | Admitting: EMERGENCY MEDICINE

## 2020-03-23 ENCOUNTER — APPOINTMENT (OUTPATIENT)
Dept: GENERAL RADIOLOGY | Facility: HOSPITAL | Age: 80
End: 2020-03-23

## 2020-03-23 ENCOUNTER — LAB REQUISITION (OUTPATIENT)
Dept: LAB | Facility: HOSPITAL | Age: 80
End: 2020-03-23

## 2020-03-23 VITALS
RESPIRATION RATE: 19 BRPM | BODY MASS INDEX: 31.16 KG/M2 | OXYGEN SATURATION: 98 % | HEIGHT: 65 IN | TEMPERATURE: 98 F | HEART RATE: 78 BPM | WEIGHT: 187 LBS | DIASTOLIC BLOOD PRESSURE: 82 MMHG | SYSTOLIC BLOOD PRESSURE: 136 MMHG

## 2020-03-23 DIAGNOSIS — N39.0 URINARY TRACT INFECTION, SITE NOT SPECIFIED: ICD-10-CM

## 2020-03-23 DIAGNOSIS — N39.0 CHRONIC UTI: ICD-10-CM

## 2020-03-23 DIAGNOSIS — J20.8 ACUTE BRONCHITIS DUE TO HUMAN METAPNEUMOVIRUS: Primary | ICD-10-CM

## 2020-03-23 DIAGNOSIS — D64.9 ANEMIA, UNSPECIFIED: ICD-10-CM

## 2020-03-23 DIAGNOSIS — I10 ESSENTIAL (PRIMARY) HYPERTENSION: ICD-10-CM

## 2020-03-23 DIAGNOSIS — R50.9 FEVER, UNSPECIFIED: ICD-10-CM

## 2020-03-23 DIAGNOSIS — B97.81 ACUTE BRONCHITIS DUE TO HUMAN METAPNEUMOVIRUS: Primary | ICD-10-CM

## 2020-03-23 LAB
A-A DO2: 35.8 MMHG (ref 0–300)
ALBUMIN SERPL-MCNC: 3.73 G/DL (ref 3.5–5.2)
ALBUMIN SERPL-MCNC: 3.87 G/DL (ref 3.5–5.2)
ALBUMIN/GLOB SERPL: 1 G/DL
ALBUMIN/GLOB SERPL: 1.1 G/DL
ALP SERPL-CCNC: 102 U/L (ref 39–117)
ALP SERPL-CCNC: 103 U/L (ref 39–117)
ALT SERPL W P-5'-P-CCNC: 10 U/L (ref 1–33)
ALT SERPL W P-5'-P-CCNC: 10 U/L (ref 1–33)
ANION GAP SERPL CALCULATED.3IONS-SCNC: 17.1 MMOL/L (ref 5–15)
ANION GAP SERPL CALCULATED.3IONS-SCNC: 18.4 MMOL/L (ref 5–15)
ARTERIAL PATENCY WRIST A: POSITIVE
AST SERPL-CCNC: 21 U/L (ref 1–32)
AST SERPL-CCNC: 22 U/L (ref 1–32)
ATMOSPHERIC PRESS: 730 MMHG
B PERT DNA SPEC QL NAA+PROBE: NOT DETECTED
BACTERIA UR QL AUTO: ABNORMAL /HPF
BASE EXCESS BLDA CALC-SCNC: -3.9 MMOL/L (ref 0–2)
BASOPHILS # BLD AUTO: 0.05 10*3/MM3 (ref 0–0.2)
BASOPHILS NFR BLD AUTO: 0.5 % (ref 0–1.5)
BDY SITE: ABNORMAL
BILIRUB SERPL-MCNC: 0.6 MG/DL (ref 0.2–1.2)
BILIRUB SERPL-MCNC: 0.6 MG/DL (ref 0.2–1.2)
BILIRUB UR QL STRIP: ABNORMAL
BODY TEMPERATURE: 0 C
BUN BLD-MCNC: 41 MG/DL (ref 8–23)
BUN BLD-MCNC: 45 MG/DL (ref 8–23)
BUN/CREAT SERPL: 28 (ref 7–25)
BUN/CREAT SERPL: 28.9 (ref 7–25)
C PNEUM DNA NPH QL NAA+NON-PROBE: NOT DETECTED
CALCIUM SPEC-SCNC: 9.3 MG/DL (ref 8.6–10.5)
CALCIUM SPEC-SCNC: 9.5 MG/DL (ref 8.6–10.5)
CHLORIDE SERPL-SCNC: 105 MMOL/L (ref 98–107)
CHLORIDE SERPL-SCNC: 106 MMOL/L (ref 98–107)
CLARITY UR: ABNORMAL
CO2 BLDA-SCNC: 21 MMOL/L (ref 22–33)
CO2 SERPL-SCNC: 17.9 MMOL/L (ref 22–29)
CO2 SERPL-SCNC: 21.6 MMOL/L (ref 22–29)
COHGB MFR BLD: 0.4 % (ref 0–5)
COLOR UR: ABNORMAL
CREAT BLD-MCNC: 1.42 MG/DL (ref 0.57–1)
CREAT BLD-MCNC: 1.61 MG/DL (ref 0.57–1)
CRP SERPL-MCNC: 10.92 MG/DL (ref 0–0.5)
CRP SERPL-MCNC: 11.68 MG/DL (ref 0–0.5)
DEPRECATED RDW RBC AUTO: 50.2 FL (ref 37–54)
DEPRECATED RDW RBC AUTO: 52.4 FL (ref 37–54)
EOSINOPHIL # BLD AUTO: 0.12 10*3/MM3 (ref 0–0.4)
EOSINOPHIL NFR BLD AUTO: 1.1 % (ref 0.3–6.2)
ERYTHROCYTE [DISTWIDTH] IN BLOOD BY AUTOMATED COUNT: 15.1 % (ref 12.3–15.4)
ERYTHROCYTE [DISTWIDTH] IN BLOOD BY AUTOMATED COUNT: 15.6 % (ref 12.3–15.4)
FLUAV H1 2009 PAND RNA NPH QL NAA+PROBE: NOT DETECTED
FLUAV H1 HA GENE NPH QL NAA+PROBE: NOT DETECTED
FLUAV H3 RNA NPH QL NAA+PROBE: NOT DETECTED
FLUAV SUBTYP SPEC NAA+PROBE: NOT DETECTED
FLUBV RNA ISLT QL NAA+PROBE: NOT DETECTED
GFR SERPL CREATININE-BSD FRML MDRD: 31 ML/MIN/1.73
GFR SERPL CREATININE-BSD FRML MDRD: 36 ML/MIN/1.73
GLOBULIN UR ELPH-MCNC: 3.4 GM/DL
GLOBULIN UR ELPH-MCNC: 3.7 GM/DL
GLUCOSE BLD-MCNC: 155 MG/DL (ref 65–99)
GLUCOSE BLD-MCNC: 179 MG/DL (ref 65–99)
GLUCOSE UR STRIP-MCNC: NEGATIVE MG/DL
HADV DNA SPEC NAA+PROBE: NOT DETECTED
HCO3 BLDA-SCNC: 20 MMOL/L (ref 20–26)
HCOV 229E RNA SPEC QL NAA+PROBE: NOT DETECTED
HCOV HKU1 RNA SPEC QL NAA+PROBE: NOT DETECTED
HCOV NL63 RNA SPEC QL NAA+PROBE: NOT DETECTED
HCOV OC43 RNA SPEC QL NAA+PROBE: NOT DETECTED
HCT VFR BLD AUTO: 44.6 % (ref 34–46.6)
HCT VFR BLD AUTO: 47.3 % (ref 34–46.6)
HCT VFR BLD CALC: 44.7 % (ref 38–51)
HGB BLD-MCNC: 14 G/DL (ref 12–15.9)
HGB BLD-MCNC: 14.7 G/DL (ref 12–15.9)
HGB BLDA-MCNC: 14.6 G/DL (ref 13.5–17.5)
HGB UR QL STRIP.AUTO: ABNORMAL
HMPV RNA NPH QL NAA+NON-PROBE: DETECTED
HOLD SPECIMEN: NORMAL
HOLD SPECIMEN: NORMAL
HOROWITZ INDEX BLD+IHG-RTO: 21 %
HPIV1 RNA SPEC QL NAA+PROBE: NOT DETECTED
HPIV2 RNA SPEC QL NAA+PROBE: NOT DETECTED
HPIV3 RNA NPH QL NAA+PROBE: NOT DETECTED
HPIV4 P GENE NPH QL NAA+PROBE: NOT DETECTED
HYALINE CASTS UR QL AUTO: ABNORMAL /LPF
IMM GRANULOCYTES # BLD AUTO: 0.03 10*3/MM3 (ref 0–0.05)
IMM GRANULOCYTES NFR BLD AUTO: 0.3 % (ref 0–0.5)
KETONES UR QL STRIP: NEGATIVE
LDH SERPL-CCNC: 342 U/L (ref 135–214)
LEUKOCYTE ESTERASE UR QL STRIP.AUTO: ABNORMAL
LYMPHOCYTES # BLD AUTO: 3.24 10*3/MM3 (ref 0.7–3.1)
LYMPHOCYTES NFR BLD AUTO: 30.6 % (ref 19.6–45.3)
Lab: ABNORMAL
M PNEUMO IGG SER IA-ACNC: NOT DETECTED
MAGNESIUM SERPL-MCNC: 2.5 MG/DL (ref 1.6–2.4)
MCH RBC QN AUTO: 28.7 PG (ref 26.6–33)
MCH RBC QN AUTO: 29.3 PG (ref 26.6–33)
MCHC RBC AUTO-ENTMCNC: 31.1 G/DL (ref 31.5–35.7)
MCHC RBC AUTO-ENTMCNC: 31.4 G/DL (ref 31.5–35.7)
MCV RBC AUTO: 91.4 FL (ref 79–97)
MCV RBC AUTO: 94.2 FL (ref 79–97)
METHGB BLD QL: 0.4 % (ref 0–3)
MODALITY: ABNORMAL
MONOCYTES # BLD AUTO: 0.53 10*3/MM3 (ref 0.1–0.9)
MONOCYTES NFR BLD AUTO: 5 % (ref 5–12)
NEUTROPHILS # BLD AUTO: 6.63 10*3/MM3 (ref 1.7–7)
NEUTROPHILS NFR BLD AUTO: 62.5 % (ref 42.7–76)
NITRITE UR QL STRIP: NEGATIVE
NOTE: ABNORMAL
NRBC BLD AUTO-RTO: 0 /100 WBC (ref 0–0.2)
NT-PROBNP SERPL-MCNC: ABNORMAL PG/ML (ref 5–1800)
OXYHGB MFR BLDV: 93 % (ref 94–99)
PCO2 BLDA: 32.8 MM HG (ref 35–45)
PCO2 TEMP ADJ BLD: ABNORMAL MM[HG]
PH BLDA: 7.39 PH UNITS (ref 7.35–7.45)
PH UR STRIP.AUTO: <=5 [PH] (ref 5–8)
PH, TEMP CORRECTED: ABNORMAL
PLATELET # BLD AUTO: 125 10*3/MM3 (ref 140–450)
PLATELET # BLD AUTO: 142 10*3/MM3 (ref 140–450)
PMV BLD AUTO: 10.8 FL (ref 6–12)
PMV BLD AUTO: 11.4 FL (ref 6–12)
PO2 BLDA: 70.5 MM HG (ref 83–108)
PO2 TEMP ADJ BLD: ABNORMAL MM[HG]
POTASSIUM BLD-SCNC: 4.3 MMOL/L (ref 3.5–5.2)
POTASSIUM BLD-SCNC: 4.5 MMOL/L (ref 3.5–5.2)
PROCALCITONIN SERPL-MCNC: 0.24 NG/ML (ref 0.1–0.25)
PROT SERPL-MCNC: 7.1 G/DL (ref 6–8.5)
PROT SERPL-MCNC: 7.6 G/DL (ref 6–8.5)
PROT UR QL STRIP: ABNORMAL
RBC # BLD AUTO: 4.88 10*6/MM3 (ref 3.77–5.28)
RBC # BLD AUTO: 5.02 10*6/MM3 (ref 3.77–5.28)
RBC # UR: ABNORMAL /HPF
REF LAB TEST METHOD: ABNORMAL
RHINOVIRUS RNA SPEC NAA+PROBE: NOT DETECTED
RSV RNA NPH QL NAA+NON-PROBE: NOT DETECTED
SAO2 % BLDCOA: 93.7 % (ref 94–99)
SODIUM BLD-SCNC: 141 MMOL/L (ref 136–145)
SODIUM BLD-SCNC: 145 MMOL/L (ref 136–145)
SP GR UR STRIP: >1.03 (ref 1–1.03)
SQUAMOUS #/AREA URNS HPF: ABNORMAL /HPF
TROPONIN T SERPL-MCNC: 0.04 NG/ML (ref 0–0.03)
TROPONIN T SERPL-MCNC: 0.05 NG/ML (ref 0–0.03)
TROPONIN T SERPL-MCNC: 0.06 NG/ML (ref 0–0.03)
UROBILINOGEN UR QL STRIP: ABNORMAL
VENTILATOR MODE: ABNORMAL
WBC NRBC COR # BLD: 10.6 10*3/MM3 (ref 3.4–10.8)
WBC NRBC COR # BLD: 10.86 10*3/MM3 (ref 3.4–10.8)
WBC UR QL AUTO: ABNORMAL /HPF
WHOLE BLOOD HOLD SPECIMEN: NORMAL
WHOLE BLOOD HOLD SPECIMEN: NORMAL

## 2020-03-23 PROCEDURE — 82375 ASSAY CARBOXYHB QUANT: CPT

## 2020-03-23 PROCEDURE — 81001 URINALYSIS AUTO W/SCOPE: CPT | Performed by: PHYSICIAN ASSISTANT

## 2020-03-23 PROCEDURE — 93010 ELECTROCARDIOGRAM REPORT: CPT | Performed by: INTERNAL MEDICINE

## 2020-03-23 PROCEDURE — 84484 ASSAY OF TROPONIN QUANT: CPT | Performed by: PHYSICIAN ASSISTANT

## 2020-03-23 PROCEDURE — 83050 HGB METHEMOGLOBIN QUAN: CPT

## 2020-03-23 PROCEDURE — 36600 WITHDRAWAL OF ARTERIAL BLOOD: CPT

## 2020-03-23 PROCEDURE — 82805 BLOOD GASES W/O2 SATURATION: CPT

## 2020-03-23 PROCEDURE — 80053 COMPREHEN METABOLIC PANEL: CPT | Performed by: INTERNAL MEDICINE

## 2020-03-23 PROCEDURE — 71045 X-RAY EXAM CHEST 1 VIEW: CPT | Performed by: RADIOLOGY

## 2020-03-23 PROCEDURE — 84484 ASSAY OF TROPONIN QUANT: CPT | Performed by: INTERNAL MEDICINE

## 2020-03-23 PROCEDURE — 83880 ASSAY OF NATRIURETIC PEPTIDE: CPT | Performed by: INTERNAL MEDICINE

## 2020-03-23 PROCEDURE — 93005 ELECTROCARDIOGRAM TRACING: CPT | Performed by: EMERGENCY MEDICINE

## 2020-03-23 PROCEDURE — 86140 C-REACTIVE PROTEIN: CPT | Performed by: PHYSICIAN ASSISTANT

## 2020-03-23 PROCEDURE — 84145 PROCALCITONIN (PCT): CPT | Performed by: PHYSICIAN ASSISTANT

## 2020-03-23 PROCEDURE — 71045 X-RAY EXAM CHEST 1 VIEW: CPT

## 2020-03-23 PROCEDURE — 87086 URINE CULTURE/COLONY COUNT: CPT | Performed by: PHYSICIAN ASSISTANT

## 2020-03-23 PROCEDURE — 83615 LACTATE (LD) (LDH) ENZYME: CPT | Performed by: PHYSICIAN ASSISTANT

## 2020-03-23 PROCEDURE — 85027 COMPLETE CBC AUTOMATED: CPT | Performed by: INTERNAL MEDICINE

## 2020-03-23 PROCEDURE — 99284 EMERGENCY DEPT VISIT MOD MDM: CPT

## 2020-03-23 PROCEDURE — P9612 CATHETERIZE FOR URINE SPEC: HCPCS

## 2020-03-23 PROCEDURE — 96365 THER/PROPH/DIAG IV INF INIT: CPT

## 2020-03-23 PROCEDURE — 80053 COMPREHEN METABOLIC PANEL: CPT | Performed by: PHYSICIAN ASSISTANT

## 2020-03-23 PROCEDURE — 85025 COMPLETE CBC W/AUTO DIFF WBC: CPT | Performed by: PHYSICIAN ASSISTANT

## 2020-03-23 PROCEDURE — 83735 ASSAY OF MAGNESIUM: CPT | Performed by: PHYSICIAN ASSISTANT

## 2020-03-23 PROCEDURE — 25010000002 CEFTRIAXONE PER 250 MG: Performed by: PHYSICIAN ASSISTANT

## 2020-03-23 PROCEDURE — 86140 C-REACTIVE PROTEIN: CPT | Performed by: INTERNAL MEDICINE

## 2020-03-23 PROCEDURE — 36415 COLL VENOUS BLD VENIPUNCTURE: CPT

## 2020-03-23 PROCEDURE — 0099U HC BIOFIRE FILMARRAY RESP PANEL 1: CPT | Performed by: PHYSICIAN ASSISTANT

## 2020-03-23 RX ORDER — NITROFURANTOIN 25; 75 MG/1; MG/1
100 CAPSULE ORAL 2 TIMES DAILY
Qty: 14 CAPSULE | Refills: 0 | Status: SHIPPED | OUTPATIENT
Start: 2020-03-23 | End: 2021-12-17

## 2020-03-23 RX ORDER — SODIUM CHLORIDE 0.9 % (FLUSH) 0.9 %
10 SYRINGE (ML) INJECTION AS NEEDED
Status: DISCONTINUED | OUTPATIENT
Start: 2020-03-23 | End: 2020-03-23 | Stop reason: HOSPADM

## 2020-03-23 RX ORDER — KETOTIFEN FUMARATE 0.35 MG/ML
1 SOLUTION/ DROPS OPHTHALMIC 2 TIMES DAILY
Status: DISCONTINUED | OUTPATIENT
Start: 2020-03-23 | End: 2020-03-23 | Stop reason: HOSPADM

## 2020-03-23 RX ADMIN — CEFTRIAXONE 2 G: 2 INJECTION, POWDER, FOR SOLUTION INTRAMUSCULAR; INTRAVENOUS at 17:10

## 2020-03-23 RX ADMIN — KETOTIFEN FUMARATE 1 DROP: 0.35 SOLUTION/ DROPS OPHTHALMIC at 17:10

## 2020-03-24 LAB — BACTERIA SPEC AEROBE CULT: NO GROWTH

## 2020-03-25 ENCOUNTER — LAB REQUISITION (OUTPATIENT)
Dept: LAB | Facility: HOSPITAL | Age: 80
End: 2020-03-25

## 2020-03-25 DIAGNOSIS — R30.0 DYSURIA: ICD-10-CM

## 2020-03-25 LAB
BACTERIA UR QL AUTO: ABNORMAL /HPF
BILIRUB UR QL STRIP: NEGATIVE
CLARITY UR: ABNORMAL
COLOR UR: ABNORMAL
GLUCOSE UR STRIP-MCNC: NEGATIVE MG/DL
HGB UR QL STRIP.AUTO: ABNORMAL
HYALINE CASTS UR QL AUTO: ABNORMAL /LPF
KETONES UR QL STRIP: ABNORMAL
LEUKOCYTE ESTERASE UR QL STRIP.AUTO: ABNORMAL
NITRITE UR QL STRIP: NEGATIVE
PH UR STRIP.AUTO: <=5 [PH] (ref 5–8)
PROT UR QL STRIP: ABNORMAL
RBC # UR: ABNORMAL /HPF
REF LAB TEST METHOD: ABNORMAL
SP GR UR STRIP: >=1.03 (ref 1–1.03)
SQUAMOUS #/AREA URNS HPF: ABNORMAL /HPF
UROBILINOGEN UR QL STRIP: ABNORMAL
WBC UR QL AUTO: ABNORMAL /HPF

## 2020-03-25 PROCEDURE — 81001 URINALYSIS AUTO W/SCOPE: CPT | Performed by: INTERNAL MEDICINE

## 2020-03-26 ENCOUNTER — LAB REQUISITION (OUTPATIENT)
Dept: LAB | Facility: HOSPITAL | Age: 80
End: 2020-03-26

## 2020-03-26 DIAGNOSIS — A41.9 SEPSIS, UNSPECIFIED ORGANISM (HCC): ICD-10-CM

## 2020-03-26 DIAGNOSIS — I10 ESSENTIAL (PRIMARY) HYPERTENSION: ICD-10-CM

## 2020-03-26 LAB
ANION GAP SERPL CALCULATED.3IONS-SCNC: 15.6 MMOL/L (ref 5–15)
BUN BLD-MCNC: 41 MG/DL (ref 8–23)
BUN/CREAT SERPL: 38 (ref 7–25)
CALCIUM SPEC-SCNC: 9.4 MG/DL (ref 8.6–10.5)
CHLORIDE SERPL-SCNC: 110 MMOL/L (ref 98–107)
CO2 SERPL-SCNC: 20.4 MMOL/L (ref 22–29)
CREAT BLD-MCNC: 1.08 MG/DL (ref 0.57–1)
CRP SERPL-MCNC: 16.49 MG/DL (ref 0–0.5)
GFR SERPL CREATININE-BSD FRML MDRD: 49 ML/MIN/1.73
GLUCOSE BLD-MCNC: 146 MG/DL (ref 65–99)
NT-PROBNP SERPL-MCNC: 8737 PG/ML (ref 5–1800)
POTASSIUM BLD-SCNC: 4.3 MMOL/L (ref 3.5–5.2)
SODIUM BLD-SCNC: 146 MMOL/L (ref 136–145)

## 2020-03-26 PROCEDURE — 80048 BASIC METABOLIC PNL TOTAL CA: CPT | Performed by: INTERNAL MEDICINE

## 2020-03-26 PROCEDURE — 86140 C-REACTIVE PROTEIN: CPT | Performed by: INTERNAL MEDICINE

## 2020-03-26 PROCEDURE — 83880 ASSAY OF NATRIURETIC PEPTIDE: CPT | Performed by: INTERNAL MEDICINE

## 2020-06-14 ENCOUNTER — TRANSCRIBE ORDERS (OUTPATIENT)
Dept: ADMINISTRATIVE | Facility: HOSPITAL | Age: 80
End: 2020-06-14

## 2020-06-14 ENCOUNTER — LAB (OUTPATIENT)
Dept: LAB | Facility: HOSPITAL | Age: 80
End: 2020-06-14

## 2020-06-14 DIAGNOSIS — Z20.828 EXPOSURE TO SARS-ASSOCIATED CORONAVIRUS: Primary | ICD-10-CM

## 2020-06-14 DIAGNOSIS — Z20.828 EXPOSURE TO SARS-ASSOCIATED CORONAVIRUS: ICD-10-CM

## 2020-06-14 PROCEDURE — U0004 COV-19 TEST NON-CDC HGH THRU: HCPCS

## 2020-06-14 PROCEDURE — C9803 HOPD COVID-19 SPEC COLLECT: HCPCS

## 2020-06-14 PROCEDURE — U0002 COVID-19 LAB TEST NON-CDC: HCPCS

## 2020-06-15 LAB
REF LAB TEST METHOD: NORMAL
SARS-COV-2 RNA RESP QL NAA+PROBE: NOT DETECTED

## 2020-07-05 ENCOUNTER — HOSPITAL ENCOUNTER (EMERGENCY)
Facility: HOSPITAL | Age: 80
Discharge: NURSING, REHAB OR LONG-TERM CARE | End: 2020-07-05
Attending: EMERGENCY MEDICINE | Admitting: EMERGENCY MEDICINE

## 2020-07-05 ENCOUNTER — APPOINTMENT (OUTPATIENT)
Dept: CT IMAGING | Facility: HOSPITAL | Age: 80
End: 2020-07-05

## 2020-07-05 VITALS
HEART RATE: 70 BPM | WEIGHT: 209.4 LBS | TEMPERATURE: 97 F | BODY MASS INDEX: 33.65 KG/M2 | OXYGEN SATURATION: 100 % | DIASTOLIC BLOOD PRESSURE: 71 MMHG | HEIGHT: 66 IN | RESPIRATION RATE: 18 BRPM | SYSTOLIC BLOOD PRESSURE: 132 MMHG

## 2020-07-05 DIAGNOSIS — F50.9 EATING DISORDER, UNSPECIFIED TYPE: Primary | ICD-10-CM

## 2020-07-05 DIAGNOSIS — N39.0 ACUTE UTI: ICD-10-CM

## 2020-07-05 LAB
ALBUMIN SERPL-MCNC: 3.4 G/DL (ref 3.5–5.2)
ALBUMIN/GLOB SERPL: 1.2 G/DL
ALP SERPL-CCNC: 91 U/L (ref 39–117)
ALT SERPL W P-5'-P-CCNC: 10 U/L (ref 1–33)
AMORPH URATE CRY URNS QL MICRO: ABNORMAL /HPF
ANION GAP SERPL CALCULATED.3IONS-SCNC: 14.3 MMOL/L (ref 5–15)
APTT PPP: 26.9 SECONDS (ref 25.6–35.3)
AST SERPL-CCNC: 20 U/L (ref 1–32)
BACTERIA UR QL AUTO: ABNORMAL /HPF
BASOPHILS # BLD AUTO: 0.04 10*3/MM3 (ref 0–0.2)
BASOPHILS NFR BLD AUTO: 0.7 % (ref 0–1.5)
BILIRUB SERPL-MCNC: 0.5 MG/DL (ref 0.2–1.2)
BILIRUB UR QL STRIP: ABNORMAL
BUN SERPL-MCNC: 12 MG/DL (ref 8–23)
BUN/CREAT SERPL: 12.4 (ref 7–25)
CALCIUM SPEC-SCNC: 9.8 MG/DL (ref 8.6–10.5)
CHLORIDE SERPL-SCNC: 103 MMOL/L (ref 98–107)
CLARITY UR: ABNORMAL
CO2 SERPL-SCNC: 26.7 MMOL/L (ref 22–29)
COD CRY URNS QL: ABNORMAL /HPF
COLOR UR: ABNORMAL
CREAT SERPL-MCNC: 0.97 MG/DL (ref 0.57–1)
DEPRECATED RDW RBC AUTO: 53.1 FL (ref 37–54)
EOSINOPHIL # BLD AUTO: 0.43 10*3/MM3 (ref 0–0.4)
EOSINOPHIL NFR BLD AUTO: 7.8 % (ref 0.3–6.2)
ERYTHROCYTE [DISTWIDTH] IN BLOOD BY AUTOMATED COUNT: 15.3 % (ref 12.3–15.4)
GFR SERPL CREATININE-BSD FRML MDRD: 55 ML/MIN/1.73
GLOBULIN UR ELPH-MCNC: 2.9 GM/DL
GLUCOSE SERPL-MCNC: 119 MG/DL (ref 65–99)
GLUCOSE UR STRIP-MCNC: NEGATIVE MG/DL
HCT VFR BLD AUTO: 43.4 % (ref 34–46.6)
HGB BLD-MCNC: 13.1 G/DL (ref 12–15.9)
HGB UR QL STRIP.AUTO: ABNORMAL
HOLD SPECIMEN: NORMAL
HOLD SPECIMEN: NORMAL
HYALINE CASTS UR QL AUTO: ABNORMAL /LPF
IMM GRANULOCYTES # BLD AUTO: 0.01 10*3/MM3 (ref 0–0.05)
IMM GRANULOCYTES NFR BLD AUTO: 0.2 % (ref 0–0.5)
INR PPP: 0.95 (ref 0.9–1.1)
KETONES UR QL STRIP: ABNORMAL
LEUKOCYTE ESTERASE UR QL STRIP.AUTO: ABNORMAL
LYMPHOCYTES # BLD AUTO: 2.46 10*3/MM3 (ref 0.7–3.1)
LYMPHOCYTES NFR BLD AUTO: 44.6 % (ref 19.6–45.3)
MCH RBC QN AUTO: 28.4 PG (ref 26.6–33)
MCHC RBC AUTO-ENTMCNC: 30.2 G/DL (ref 31.5–35.7)
MCV RBC AUTO: 94.1 FL (ref 79–97)
MONOCYTES # BLD AUTO: 0.37 10*3/MM3 (ref 0.1–0.9)
MONOCYTES NFR BLD AUTO: 6.7 % (ref 5–12)
NEUTROPHILS NFR BLD AUTO: 2.2 10*3/MM3 (ref 1.7–7)
NEUTROPHILS NFR BLD AUTO: 40 % (ref 42.7–76)
NITRITE UR QL STRIP: NEGATIVE
NRBC BLD AUTO-RTO: 0 /100 WBC (ref 0–0.2)
PH UR STRIP.AUTO: 6 [PH] (ref 5–8)
PLATELET # BLD AUTO: 210 10*3/MM3 (ref 140–450)
PMV BLD AUTO: 10.4 FL (ref 6–12)
POTASSIUM SERPL-SCNC: 4.7 MMOL/L (ref 3.5–5.2)
PROT SERPL-MCNC: 6.3 G/DL (ref 6–8.5)
PROT UR QL STRIP: ABNORMAL
PROTHROMBIN TIME: 12.4 SECONDS (ref 11.9–14.1)
RBC # BLD AUTO: 4.61 10*6/MM3 (ref 3.77–5.28)
RBC # UR: ABNORMAL /HPF
REF LAB TEST METHOD: ABNORMAL
SARS-COV-2 RDRP RESP QL NAA+PROBE: NOT DETECTED
SODIUM SERPL-SCNC: 144 MMOL/L (ref 136–145)
SP GR UR STRIP: 1.02 (ref 1–1.03)
SQUAMOUS #/AREA URNS HPF: ABNORMAL /HPF
UROBILINOGEN UR QL STRIP: ABNORMAL
WBC # BLD AUTO: 5.51 10*3/MM3 (ref 3.4–10.8)
WBC UR QL AUTO: ABNORMAL /HPF
WHOLE BLOOD HOLD SPECIMEN: NORMAL
WHOLE BLOOD HOLD SPECIMEN: NORMAL

## 2020-07-05 PROCEDURE — 87040 BLOOD CULTURE FOR BACTERIA: CPT | Performed by: NURSE PRACTITIONER

## 2020-07-05 PROCEDURE — 87186 SC STD MICRODIL/AGAR DIL: CPT | Performed by: NURSE PRACTITIONER

## 2020-07-05 PROCEDURE — 70450 CT HEAD/BRAIN W/O DYE: CPT

## 2020-07-05 PROCEDURE — 87077 CULTURE AEROBIC IDENTIFY: CPT | Performed by: NURSE PRACTITIONER

## 2020-07-05 PROCEDURE — 87086 URINE CULTURE/COLONY COUNT: CPT | Performed by: NURSE PRACTITIONER

## 2020-07-05 PROCEDURE — 25010000002 CEFTRIAXONE PER 250 MG: Performed by: NURSE PRACTITIONER

## 2020-07-05 PROCEDURE — 81001 URINALYSIS AUTO W/SCOPE: CPT | Performed by: NURSE PRACTITIONER

## 2020-07-05 PROCEDURE — 80053 COMPREHEN METABOLIC PANEL: CPT | Performed by: NURSE PRACTITIONER

## 2020-07-05 PROCEDURE — 99285 EMERGENCY DEPT VISIT HI MDM: CPT

## 2020-07-05 PROCEDURE — 87635 SARS-COV-2 COVID-19 AMP PRB: CPT | Performed by: NURSE PRACTITIONER

## 2020-07-05 PROCEDURE — 85025 COMPLETE CBC W/AUTO DIFF WBC: CPT | Performed by: NURSE PRACTITIONER

## 2020-07-05 PROCEDURE — 70450 CT HEAD/BRAIN W/O DYE: CPT | Performed by: RADIOLOGY

## 2020-07-05 PROCEDURE — 96365 THER/PROPH/DIAG IV INF INIT: CPT

## 2020-07-05 PROCEDURE — 85610 PROTHROMBIN TIME: CPT | Performed by: NURSE PRACTITIONER

## 2020-07-05 PROCEDURE — 85730 THROMBOPLASTIN TIME PARTIAL: CPT | Performed by: NURSE PRACTITIONER

## 2020-07-05 PROCEDURE — 96361 HYDRATE IV INFUSION ADD-ON: CPT

## 2020-07-05 PROCEDURE — 99284 EMERGENCY DEPT VISIT MOD MDM: CPT

## 2020-07-05 RX ORDER — SODIUM CHLORIDE 0.9 % (FLUSH) 0.9 %
10 SYRINGE (ML) INJECTION AS NEEDED
Status: DISCONTINUED | OUTPATIENT
Start: 2020-07-05 | End: 2020-07-05 | Stop reason: HOSPADM

## 2020-07-05 RX ORDER — CEFDINIR 300 MG/1
300 CAPSULE ORAL 2 TIMES DAILY
Qty: 20 CAPSULE | Refills: 0 | Status: SHIPPED | OUTPATIENT
Start: 2020-07-05 | End: 2020-07-15

## 2020-07-05 RX ADMIN — CEFTRIAXONE 1 G: 1 INJECTION, POWDER, FOR SOLUTION INTRAMUSCULAR; INTRAVENOUS at 15:06

## 2020-07-05 RX ADMIN — SODIUM CHLORIDE 500 ML: 9 INJECTION, SOLUTION INTRAVENOUS at 14:25

## 2020-07-05 NOTE — DISCHARGE INSTRUCTIONS
Continue previous nursing home orders.     Return to the emergency room for worsening symptoms.

## 2020-07-05 NOTE — ED PROVIDER NOTES
Subjective   Per nursing home report patient has not been eating for past 2 days.        History provided by:  Patient and nursing home   used: No    Illness   Location:  See narrative above. Nursing home reports patient has not been eating and has been somewhat lethargic  Quality:  Unable to specify  Severity:  Unable to specify  Onset quality:  Unable to specify  Timing:  Unable to specify  Progression:  Unable to specify  Chronicity:  Recurrent  Context:  Patient was sent for above.  Relieved by:  Nothing  Worsened by:  Nothing  Ineffective treatments:  None tried  Associated symptoms: no cough and no shortness of breath    Associated symptoms comment:  Unable to fully assess ROS due to patient confusion/dementia.   Risk factors:  Elderly      Review of Systems   Unable to perform ROS: Dementia   Respiratory: Negative for cough and shortness of breath.    Psychiatric/Behavioral: Positive for agitation and confusion.       Past Medical History:   Diagnosis Date   • Dementia (CMS/HCC)    • Depression    • Diabetes mellitus (CMS/HCC)    • Hyperlipidemia    • Hypertension    • Obesity    • Senile dementia (CMS/HCC)    • Vitamin D deficiency disease        No Known Allergies    Past Surgical History:   Procedure Laterality Date   • CARDIAC CATHETERIZATION  05/05/2006   • CARDIOVASCULAR STRESS TEST  05/05/2006   • COLONOSCOPY  03/21/2008   • HYSTERECTOMY     • THYROID SURGERY         Family History   Family history unknown: Yes       Social History     Socioeconomic History   • Marital status:      Spouse name: Not on file   • Number of children: Not on file   • Years of education: Not on file   • Highest education level: Not on file   Tobacco Use   • Smoking status: Never Smoker   • Smokeless tobacco: Never Used   Substance and Sexual Activity   • Alcohol use: No   • Drug use: No   • Sexual activity: Defer           Objective   Physical Exam   Constitutional: She appears well-developed and  well-nourished.   HENT:   Head: Normocephalic.   Right Ear: External ear normal.   Eyes: Pupils are equal, round, and reactive to light.   Neck: Normal range of motion.   Cardiovascular: Normal rate, regular rhythm and normal heart sounds.   Pulmonary/Chest: Effort normal and breath sounds normal.   Abdominal: Soft. Bowel sounds are normal.   Neurological: She is alert.   Skin: Skin is warm and dry. Capillary refill takes less than 2 seconds.   Psychiatric:   Patient alert but confused     Nursing note and vitals reviewed.      Procedures           ED Course                                           MDM  Number of Diagnoses or Management Options  Eating disorder, unspecified type:       Final diagnoses:   Eating disorder, unspecified type   Acute UTI            Nigel Oseguera, APRN  07/05/20 1919

## 2020-07-07 ENCOUNTER — TELEPHONE (OUTPATIENT)
Dept: EMERGENCY DEPT | Facility: HOSPITAL | Age: 80
End: 2020-07-07

## 2020-07-07 LAB — BACTERIA SPEC AEROBE CULT: ABNORMAL

## 2020-07-10 LAB
BACTERIA SPEC AEROBE CULT: NORMAL
BACTERIA SPEC AEROBE CULT: NORMAL

## 2020-07-14 ENCOUNTER — LAB REQUISITION (OUTPATIENT)
Dept: LAB | Facility: HOSPITAL | Age: 80
End: 2020-07-14

## 2020-07-14 DIAGNOSIS — R30.0 DYSURIA: ICD-10-CM

## 2020-07-14 LAB
BACTERIA UR QL AUTO: ABNORMAL /HPF
BILIRUB UR QL STRIP: ABNORMAL
CLARITY UR: ABNORMAL
COLOR UR: ABNORMAL
GLUCOSE UR STRIP-MCNC: NEGATIVE MG/DL
HGB UR QL STRIP.AUTO: ABNORMAL
HYALINE CASTS UR QL AUTO: ABNORMAL /LPF
KETONES UR QL STRIP: ABNORMAL
LEUKOCYTE ESTERASE UR QL STRIP.AUTO: ABNORMAL
NITRITE UR QL STRIP: POSITIVE
PH UR STRIP.AUTO: 5.5 [PH] (ref 5–8)
PROT UR QL STRIP: ABNORMAL
RBC # UR: ABNORMAL /HPF
REF LAB TEST METHOD: ABNORMAL
SP GR UR STRIP: >=1.03 (ref 1–1.03)
SQUAMOUS #/AREA URNS HPF: ABNORMAL /HPF
UROBILINOGEN UR QL STRIP: ABNORMAL
WBC UR QL AUTO: ABNORMAL /HPF

## 2020-07-14 PROCEDURE — 81001 URINALYSIS AUTO W/SCOPE: CPT | Performed by: INTERNAL MEDICINE

## 2020-07-14 PROCEDURE — 87086 URINE CULTURE/COLONY COUNT: CPT | Performed by: INTERNAL MEDICINE

## 2020-07-15 LAB — BACTERIA SPEC AEROBE CULT: NO GROWTH

## 2021-04-24 ENCOUNTER — LAB REQUISITION (OUTPATIENT)
Dept: LAB | Facility: HOSPITAL | Age: 81
End: 2021-04-24

## 2021-04-24 DIAGNOSIS — N39.0 URINARY TRACT INFECTION, SITE NOT SPECIFIED: ICD-10-CM

## 2021-04-24 LAB
BACTERIA UR QL AUTO: ABNORMAL /HPF
BILIRUB UR QL STRIP: NEGATIVE
CLARITY UR: ABNORMAL
COLOR UR: YELLOW
GLUCOSE UR STRIP-MCNC: NEGATIVE MG/DL
HGB UR QL STRIP.AUTO: ABNORMAL
HYALINE CASTS UR QL AUTO: ABNORMAL /LPF
KETONES UR QL STRIP: NEGATIVE
LEUKOCYTE ESTERASE UR QL STRIP.AUTO: ABNORMAL
NITRITE UR QL STRIP: POSITIVE
PH UR STRIP.AUTO: 7 [PH] (ref 5–8)
PROT UR QL STRIP: ABNORMAL
RBC # UR: ABNORMAL /HPF
REF LAB TEST METHOD: ABNORMAL
SP GR UR STRIP: 1.02 (ref 1–1.03)
SQUAMOUS #/AREA URNS HPF: ABNORMAL /HPF
UROBILINOGEN UR QL STRIP: ABNORMAL
WBC UR QL AUTO: ABNORMAL /HPF

## 2021-04-24 PROCEDURE — 87077 CULTURE AEROBIC IDENTIFY: CPT | Performed by: INTERNAL MEDICINE

## 2021-04-24 PROCEDURE — 87186 SC STD MICRODIL/AGAR DIL: CPT | Performed by: INTERNAL MEDICINE

## 2021-04-24 PROCEDURE — 87086 URINE CULTURE/COLONY COUNT: CPT | Performed by: INTERNAL MEDICINE

## 2021-04-24 PROCEDURE — 81001 URINALYSIS AUTO W/SCOPE: CPT | Performed by: INTERNAL MEDICINE

## 2021-04-26 LAB — BACTERIA SPEC AEROBE CULT: ABNORMAL

## 2021-12-16 ENCOUNTER — APPOINTMENT (OUTPATIENT)
Dept: CT IMAGING | Facility: HOSPITAL | Age: 81
End: 2021-12-16

## 2021-12-16 ENCOUNTER — HOSPITAL ENCOUNTER (EMERGENCY)
Facility: HOSPITAL | Age: 81
Discharge: NURSING, REHAB OR LONG-TERM CARE | End: 2021-12-17
Attending: EMERGENCY MEDICINE | Admitting: STUDENT IN AN ORGANIZED HEALTH CARE EDUCATION/TRAINING PROGRAM

## 2021-12-16 DIAGNOSIS — N12 PYELONEPHRITIS: Primary | ICD-10-CM

## 2021-12-16 DIAGNOSIS — N20.0 KIDNEY STONE: ICD-10-CM

## 2021-12-16 LAB
ALBUMIN SERPL-MCNC: 3.77 G/DL (ref 3.5–5.2)
ALBUMIN/GLOB SERPL: 1.3 G/DL
ALP SERPL-CCNC: 139 U/L (ref 39–117)
ALT SERPL W P-5'-P-CCNC: 9 U/L (ref 1–33)
ANION GAP SERPL CALCULATED.3IONS-SCNC: 9.5 MMOL/L (ref 5–15)
AST SERPL-CCNC: 13 U/L (ref 1–32)
BACTERIA UR QL AUTO: ABNORMAL /HPF
BASOPHILS # BLD AUTO: 0.03 10*3/MM3 (ref 0–0.2)
BASOPHILS NFR BLD AUTO: 0.4 % (ref 0–1.5)
BILIRUB SERPL-MCNC: 0.3 MG/DL (ref 0–1.2)
BILIRUB UR QL STRIP: NEGATIVE
BUN SERPL-MCNC: 21 MG/DL (ref 8–23)
BUN/CREAT SERPL: 17.2 (ref 7–25)
CALCIUM SPEC-SCNC: 9.5 MG/DL (ref 8.6–10.5)
CHLORIDE SERPL-SCNC: 107 MMOL/L (ref 98–107)
CLARITY UR: CLEAR
CO2 SERPL-SCNC: 24.5 MMOL/L (ref 22–29)
COLOR UR: YELLOW
CREAT SERPL-MCNC: 1.22 MG/DL (ref 0.57–1)
D-LACTATE SERPL-SCNC: 1.9 MMOL/L (ref 0.5–2)
DEPRECATED RDW RBC AUTO: 48.6 FL (ref 37–54)
EOSINOPHIL # BLD AUTO: 0.1 10*3/MM3 (ref 0–0.4)
EOSINOPHIL NFR BLD AUTO: 1.5 % (ref 0.3–6.2)
ERYTHROCYTE [DISTWIDTH] IN BLOOD BY AUTOMATED COUNT: 14.4 % (ref 12.3–15.4)
GFR SERPL CREATININE-BSD FRML MDRD: 42 ML/MIN/1.73
GLOBULIN UR ELPH-MCNC: 2.8 GM/DL
GLUCOSE SERPL-MCNC: 270 MG/DL (ref 65–99)
GLUCOSE UR STRIP-MCNC: ABNORMAL MG/DL
HCT VFR BLD AUTO: 40.9 % (ref 34–46.6)
HGB BLD-MCNC: 12.7 G/DL (ref 12–15.9)
HGB UR QL STRIP.AUTO: ABNORMAL
HYALINE CASTS UR QL AUTO: ABNORMAL /LPF
IMM GRANULOCYTES # BLD AUTO: 0.01 10*3/MM3 (ref 0–0.05)
IMM GRANULOCYTES NFR BLD AUTO: 0.1 % (ref 0–0.5)
KETONES UR QL STRIP: NEGATIVE
LEUKOCYTE ESTERASE UR QL STRIP.AUTO: ABNORMAL
LYMPHOCYTES # BLD AUTO: 1.9 10*3/MM3 (ref 0.7–3.1)
LYMPHOCYTES NFR BLD AUTO: 28.4 % (ref 19.6–45.3)
MCH RBC QN AUTO: 28.5 PG (ref 26.6–33)
MCHC RBC AUTO-ENTMCNC: 31.1 G/DL (ref 31.5–35.7)
MCV RBC AUTO: 91.7 FL (ref 79–97)
MONOCYTES # BLD AUTO: 0.48 10*3/MM3 (ref 0.1–0.9)
MONOCYTES NFR BLD AUTO: 7.2 % (ref 5–12)
NEUTROPHILS NFR BLD AUTO: 4.18 10*3/MM3 (ref 1.7–7)
NEUTROPHILS NFR BLD AUTO: 62.4 % (ref 42.7–76)
NITRITE UR QL STRIP: POSITIVE
NRBC BLD AUTO-RTO: 0 /100 WBC (ref 0–0.2)
PH UR STRIP.AUTO: 5.5 [PH] (ref 5–8)
PLATELET # BLD AUTO: 193 10*3/MM3 (ref 140–450)
PMV BLD AUTO: 10.1 FL (ref 6–12)
POTASSIUM SERPL-SCNC: 4.4 MMOL/L (ref 3.5–5.2)
PROT SERPL-MCNC: 6.6 G/DL (ref 6–8.5)
PROT UR QL STRIP: ABNORMAL
RBC # BLD AUTO: 4.46 10*6/MM3 (ref 3.77–5.28)
RBC # UR STRIP: ABNORMAL /HPF
REF LAB TEST METHOD: ABNORMAL
SODIUM SERPL-SCNC: 141 MMOL/L (ref 136–145)
SP GR UR STRIP: 1.03 (ref 1–1.03)
SQUAMOUS #/AREA URNS HPF: ABNORMAL /HPF
UROBILINOGEN UR QL STRIP: ABNORMAL
WBC # UR STRIP: ABNORMAL /HPF
WBC NRBC COR # BLD: 6.7 10*3/MM3 (ref 3.4–10.8)

## 2021-12-16 PROCEDURE — 99284 EMERGENCY DEPT VISIT MOD MDM: CPT

## 2021-12-16 PROCEDURE — 81001 URINALYSIS AUTO W/SCOPE: CPT | Performed by: PHYSICIAN ASSISTANT

## 2021-12-16 PROCEDURE — 87181 SC STD AGAR DILUTION PER AGT: CPT | Performed by: PHYSICIAN ASSISTANT

## 2021-12-16 PROCEDURE — 87077 CULTURE AEROBIC IDENTIFY: CPT | Performed by: PHYSICIAN ASSISTANT

## 2021-12-16 PROCEDURE — 99283 EMERGENCY DEPT VISIT LOW MDM: CPT

## 2021-12-16 PROCEDURE — 87040 BLOOD CULTURE FOR BACTERIA: CPT | Performed by: STUDENT IN AN ORGANIZED HEALTH CARE EDUCATION/TRAINING PROGRAM

## 2021-12-16 PROCEDURE — 25010000002 ONDANSETRON PER 1 MG: Performed by: PHYSICIAN ASSISTANT

## 2021-12-16 PROCEDURE — 80053 COMPREHEN METABOLIC PANEL: CPT | Performed by: STUDENT IN AN ORGANIZED HEALTH CARE EDUCATION/TRAINING PROGRAM

## 2021-12-16 PROCEDURE — 74176 CT ABD & PELVIS W/O CONTRAST: CPT

## 2021-12-16 PROCEDURE — 87086 URINE CULTURE/COLONY COUNT: CPT | Performed by: PHYSICIAN ASSISTANT

## 2021-12-16 PROCEDURE — 36415 COLL VENOUS BLD VENIPUNCTURE: CPT

## 2021-12-16 PROCEDURE — 83605 ASSAY OF LACTIC ACID: CPT | Performed by: STUDENT IN AN ORGANIZED HEALTH CARE EDUCATION/TRAINING PROGRAM

## 2021-12-16 PROCEDURE — 85025 COMPLETE CBC W/AUTO DIFF WBC: CPT | Performed by: STUDENT IN AN ORGANIZED HEALTH CARE EDUCATION/TRAINING PROGRAM

## 2021-12-16 PROCEDURE — 96375 TX/PRO/DX INJ NEW DRUG ADDON: CPT

## 2021-12-16 PROCEDURE — 25010000002 LORAZEPAM PER 2 MG: Performed by: STUDENT IN AN ORGANIZED HEALTH CARE EDUCATION/TRAINING PROGRAM

## 2021-12-16 PROCEDURE — 25010000002 HYDROMORPHONE 1 MG/ML SOLUTION: Performed by: STUDENT IN AN ORGANIZED HEALTH CARE EDUCATION/TRAINING PROGRAM

## 2021-12-16 PROCEDURE — 86140 C-REACTIVE PROTEIN: CPT | Performed by: NURSE PRACTITIONER

## 2021-12-16 PROCEDURE — 87186 SC STD MICRODIL/AGAR DIL: CPT | Performed by: PHYSICIAN ASSISTANT

## 2021-12-16 RX ORDER — SODIUM CHLORIDE 0.9 % (FLUSH) 0.9 %
10 SYRINGE (ML) INJECTION AS NEEDED
Status: DISCONTINUED | OUTPATIENT
Start: 2021-12-16 | End: 2021-12-17 | Stop reason: HOSPADM

## 2021-12-16 RX ORDER — ONDANSETRON 2 MG/ML
4 INJECTION INTRAMUSCULAR; INTRAVENOUS ONCE
Status: COMPLETED | OUTPATIENT
Start: 2021-12-16 | End: 2021-12-16

## 2021-12-16 RX ORDER — LORAZEPAM 2 MG/ML
1 INJECTION INTRAMUSCULAR ONCE
Status: COMPLETED | OUTPATIENT
Start: 2021-12-16 | End: 2021-12-16

## 2021-12-16 RX ADMIN — ONDANSETRON 4 MG: 2 INJECTION INTRAMUSCULAR; INTRAVENOUS at 21:10

## 2021-12-16 RX ADMIN — HYDROMORPHONE HYDROCHLORIDE 1 MG: 1 INJECTION, SOLUTION INTRAMUSCULAR; INTRAVENOUS; SUBCUTANEOUS at 21:10

## 2021-12-16 RX ADMIN — LORAZEPAM 1 MG: 2 INJECTION INTRAMUSCULAR; INTRAVENOUS at 21:45

## 2021-12-17 VITALS
RESPIRATION RATE: 20 BRPM | TEMPERATURE: 98.1 F | WEIGHT: 209 LBS | HEIGHT: 64 IN | BODY MASS INDEX: 35.68 KG/M2 | HEART RATE: 74 BPM | SYSTOLIC BLOOD PRESSURE: 117 MMHG | DIASTOLIC BLOOD PRESSURE: 67 MMHG | OXYGEN SATURATION: 94 %

## 2021-12-17 LAB
ANION GAP SERPL CALCULATED.3IONS-SCNC: 10.8 MMOL/L (ref 5–15)
BASOPHILS # BLD AUTO: 0.05 10*3/MM3 (ref 0–0.2)
BASOPHILS NFR BLD AUTO: 0.6 % (ref 0–1.5)
BUN SERPL-MCNC: 21 MG/DL (ref 8–23)
BUN/CREAT SERPL: 18.8 (ref 7–25)
CALCIUM SPEC-SCNC: 9.5 MG/DL (ref 8.6–10.5)
CHLORIDE SERPL-SCNC: 112 MMOL/L (ref 98–107)
CO2 SERPL-SCNC: 20.2 MMOL/L (ref 22–29)
CREAT SERPL-MCNC: 1.12 MG/DL (ref 0.57–1)
CRP SERPL-MCNC: <0.3 MG/DL (ref 0–0.5)
CRP SERPL-MCNC: <0.3 MG/DL (ref 0–0.5)
DEPRECATED RDW RBC AUTO: 50.7 FL (ref 37–54)
EOSINOPHIL # BLD AUTO: 0.5 10*3/MM3 (ref 0–0.4)
EOSINOPHIL NFR BLD AUTO: 5.7 % (ref 0.3–6.2)
ERYTHROCYTE [DISTWIDTH] IN BLOOD BY AUTOMATED COUNT: 14.6 % (ref 12.3–15.4)
FLUAV RNA RESP QL NAA+PROBE: NOT DETECTED
FLUBV RNA RESP QL NAA+PROBE: NOT DETECTED
GFR SERPL CREATININE-BSD FRML MDRD: 47 ML/MIN/1.73
GLUCOSE SERPL-MCNC: 154 MG/DL (ref 65–99)
HCT VFR BLD AUTO: 44.3 % (ref 34–46.6)
HGB BLD-MCNC: 13.2 G/DL (ref 12–15.9)
IMM GRANULOCYTES # BLD AUTO: 0.02 10*3/MM3 (ref 0–0.05)
IMM GRANULOCYTES NFR BLD AUTO: 0.2 % (ref 0–0.5)
LYMPHOCYTES # BLD AUTO: 2.37 10*3/MM3 (ref 0.7–3.1)
LYMPHOCYTES NFR BLD AUTO: 26.9 % (ref 19.6–45.3)
MCH RBC QN AUTO: 28.4 PG (ref 26.6–33)
MCHC RBC AUTO-ENTMCNC: 29.8 G/DL (ref 31.5–35.7)
MCV RBC AUTO: 95.3 FL (ref 79–97)
MONOCYTES # BLD AUTO: 0.53 10*3/MM3 (ref 0.1–0.9)
MONOCYTES NFR BLD AUTO: 6 % (ref 5–12)
NEUTROPHILS NFR BLD AUTO: 5.33 10*3/MM3 (ref 1.7–7)
NEUTROPHILS NFR BLD AUTO: 60.6 % (ref 42.7–76)
NRBC BLD AUTO-RTO: 0 /100 WBC (ref 0–0.2)
PLATELET # BLD AUTO: 168 10*3/MM3 (ref 140–450)
PMV BLD AUTO: 10.1 FL (ref 6–12)
POTASSIUM SERPL-SCNC: 5 MMOL/L (ref 3.5–5.2)
RBC # BLD AUTO: 4.65 10*6/MM3 (ref 3.77–5.28)
SARS-COV-2 RNA RESP QL NAA+PROBE: NOT DETECTED
SODIUM SERPL-SCNC: 143 MMOL/L (ref 136–145)
WBC NRBC COR # BLD: 8.8 10*3/MM3 (ref 3.4–10.8)

## 2021-12-17 PROCEDURE — 96361 HYDRATE IV INFUSION ADD-ON: CPT

## 2021-12-17 PROCEDURE — 86140 C-REACTIVE PROTEIN: CPT | Performed by: PHYSICIAN ASSISTANT

## 2021-12-17 PROCEDURE — 87636 SARSCOV2 & INF A&B AMP PRB: CPT | Performed by: PHYSICIAN ASSISTANT

## 2021-12-17 PROCEDURE — 85025 COMPLETE CBC W/AUTO DIFF WBC: CPT | Performed by: PHYSICIAN ASSISTANT

## 2021-12-17 PROCEDURE — 25010000002 CEFTRIAXONE PER 250 MG: Performed by: PHYSICIAN ASSISTANT

## 2021-12-17 PROCEDURE — 99283 EMERGENCY DEPT VISIT LOW MDM: CPT | Performed by: UROLOGY

## 2021-12-17 PROCEDURE — 80048 BASIC METABOLIC PNL TOTAL CA: CPT | Performed by: PHYSICIAN ASSISTANT

## 2021-12-17 PROCEDURE — 96365 THER/PROPH/DIAG IV INF INIT: CPT

## 2021-12-17 RX ORDER — LEVOTHYROXINE SODIUM 0.05 MG/1
50 TABLET ORAL
COMMUNITY

## 2021-12-17 RX ORDER — ATORVASTATIN CALCIUM 80 MG/1
80 TABLET, FILM COATED ORAL NIGHTLY
COMMUNITY

## 2021-12-17 RX ORDER — TOPIRAMATE 25 MG/1
25 TABLET ORAL EVERY 12 HOURS SCHEDULED
Status: DISCONTINUED | OUTPATIENT
Start: 2021-12-17 | End: 2021-12-17 | Stop reason: HOSPADM

## 2021-12-17 RX ORDER — CEFDINIR 300 MG/1
300 CAPSULE ORAL 2 TIMES DAILY
Qty: 20 CAPSULE | Refills: 0 | Status: SHIPPED | OUTPATIENT
Start: 2021-12-17 | End: 2021-12-27

## 2021-12-17 RX ORDER — LAMOTRIGINE 100 MG/1
100 TABLET ORAL EVERY 12 HOURS SCHEDULED
Status: DISCONTINUED | OUTPATIENT
Start: 2021-12-17 | End: 2021-12-17 | Stop reason: HOSPADM

## 2021-12-17 RX ORDER — ACETAMINOPHEN 500 MG
500 TABLET ORAL EVERY 6 HOURS PRN
COMMUNITY

## 2021-12-17 RX ORDER — ASPIRIN 81 MG/1
81 TABLET, CHEWABLE ORAL DAILY
Status: CANCELLED | OUTPATIENT
Start: 2021-12-17

## 2021-12-17 RX ORDER — MIRTAZAPINE 15 MG/1
7.5 TABLET, FILM COATED ORAL NIGHTLY
COMMUNITY
End: 2022-03-03 | Stop reason: DRUGHIGH

## 2021-12-17 RX ORDER — ERGOCALCIFEROL 1.25 MG/1
50000 CAPSULE ORAL WEEKLY
COMMUNITY

## 2021-12-17 RX ORDER — BISACODYL 10 MG
10 SUPPOSITORY, RECTAL RECTAL DAILY PRN
COMMUNITY

## 2021-12-17 RX ORDER — SERTRALINE HYDROCHLORIDE 25 MG/1
75 TABLET, FILM COATED ORAL DAILY
COMMUNITY
End: 2022-03-03 | Stop reason: DRUGHIGH

## 2021-12-17 RX ORDER — POTASSIUM CHLORIDE 750 MG/1
10 TABLET, EXTENDED RELEASE ORAL DAILY
COMMUNITY

## 2021-12-17 RX ORDER — SODIUM CHLORIDE 9 MG/ML
150 INJECTION, SOLUTION INTRAVENOUS CONTINUOUS
Status: DISCONTINUED | OUTPATIENT
Start: 2021-12-17 | End: 2021-12-17 | Stop reason: HOSPADM

## 2021-12-17 RX ORDER — SENNA PLUS 8.6 MG/1
1 TABLET ORAL 2 TIMES DAILY
Status: CANCELLED | OUTPATIENT
Start: 2021-12-17

## 2021-12-17 RX ORDER — LEVOTHYROXINE SODIUM 0.05 MG/1
50 TABLET ORAL
Status: DISCONTINUED | OUTPATIENT
Start: 2021-12-17 | End: 2021-12-17 | Stop reason: HOSPADM

## 2021-12-17 RX ORDER — BISACODYL 10 MG
10 SUPPOSITORY, RECTAL RECTAL DAILY PRN
Status: CANCELLED | OUTPATIENT
Start: 2021-12-17

## 2021-12-17 RX ORDER — ATORVASTATIN CALCIUM 40 MG/1
80 TABLET, FILM COATED ORAL NIGHTLY
Status: CANCELLED | OUTPATIENT
Start: 2021-12-17

## 2021-12-17 RX ORDER — ASPIRIN 81 MG/1
81 TABLET, CHEWABLE ORAL DAILY
COMMUNITY

## 2021-12-17 RX ORDER — ACETAMINOPHEN 500 MG
500 TABLET ORAL EVERY 6 HOURS PRN
Status: CANCELLED | OUTPATIENT
Start: 2021-12-17

## 2021-12-17 RX ORDER — POTASSIUM CHLORIDE 20 MEQ/1
10 TABLET, EXTENDED RELEASE ORAL DAILY
Status: CANCELLED | OUTPATIENT
Start: 2021-12-17

## 2021-12-17 RX ORDER — MIRTAZAPINE 15 MG/1
7.5 TABLET, FILM COATED ORAL NIGHTLY
Status: CANCELLED | OUTPATIENT
Start: 2021-12-17

## 2021-12-17 RX ORDER — TOPIRAMATE 25 MG/1
25 TABLET ORAL 2 TIMES DAILY
COMMUNITY

## 2021-12-17 RX ORDER — LORAZEPAM 0.5 MG/1
0.5 TABLET ORAL EVERY 12 HOURS
Status: CANCELLED | OUTPATIENT
Start: 2021-12-17

## 2021-12-17 RX ORDER — SENNA PLUS 8.6 MG/1
1 TABLET ORAL 2 TIMES DAILY
COMMUNITY
End: 2023-03-21

## 2021-12-17 RX ORDER — LAMOTRIGINE 100 MG/1
100 TABLET ORAL EVERY MORNING
COMMUNITY

## 2021-12-17 RX ADMIN — SODIUM CHLORIDE 150 ML/HR: 900 INJECTION INTRAVENOUS at 12:27

## 2021-12-17 RX ADMIN — SODIUM CHLORIDE 2 G: 9 INJECTION, SOLUTION INTRAVENOUS at 00:05

## 2021-12-17 NOTE — ED NOTES
Report called to Tara KING at [facility] at this time.     Kiya Cedillo RN  12/17/21 [id number]

## 2021-12-17 NOTE — ED NOTES
Called [facility] for transport back to the [facility]      Gen Mcmillan  12/17/21 [id number]

## 2021-12-17 NOTE — ED NOTES
I called Dr. Chapman per Kate, he answered, call transferred to Kate.     Breezy Escamilla  12/17/21 0039

## 2021-12-17 NOTE — ED NOTES
Spoke to Tara at [facility] at this time and updated on her pt's condition.      Kiya Cedillo RN  12/17/21 [id number]

## 2021-12-17 NOTE — ED NOTES
Spoke to pharmacy at this time to validate patients medications.      Kiya Cedillo, RN  12/17/21 [id number]

## 2021-12-17 NOTE — CONSULTS
Name:  Luz Beasley  :  1940    DATE OF ADMISSION  2021    DATE OF CONSULT  2021     REFERRING PHYSICIAN  * No referring provider recorded for this case *    PRIMARY CARE PHYSICIAN  Maricarmen De La Vega MD    REASON FOR CONSULT  Pyelonephritis    CHIEF COMPLAINT  Chief Complaint   Patient presents with   • Abdominal Pain       HISTORY OF PRESENT ILLNESS:   Luz Beasley is a 81 y.o. female who is being admitted to the hospitalist service with right-sided pyelonephritis.  She has been sedated heavily and therefore is not an adequate historian.  She has a minimally obstructing right renal pelvic calculus with minimal hydronephrosis.  A normal white count.  I would recommend treating her pyelonephritis and then as an outpatient I will then treat the stone.  She does not need a stent at this time    I saw Luz Beasley in their hospital room this morning.    PAST MEDICAL HISTORY  Past Medical History:   Diagnosis Date   • Dementia (HCC)    • Depression    • Diabetes mellitus (HCC)    • Hyperlipidemia    • Hypertension    • Obesity    • Senile dementia (HCC)    • Vitamin D deficiency disease        PAST SURGICAL HISTORY  Past Surgical History:   Procedure Laterality Date   • CARDIAC CATHETERIZATION  2006   • CARDIOVASCULAR STRESS TEST  2006   • COLONOSCOPY  2008   • HYSTERECTOMY     • THYROID SURGERY         SOCIAL HISTORY  Social History     Socioeconomic History   • Marital status:    Tobacco Use   • Smoking status: Never Smoker   • Smokeless tobacco: Never Used   Substance and Sexual Activity   • Alcohol use: No   • Drug use: No   • Sexual activity: Defer       FAMILY HISTORY  Family History   Family history unknown: Yes       ALLERGIES  No Known Allergies    INPATIENT MEDICATIONS  Current Facility-Administered Medications   Medication Dose Route Frequency Provider Last Rate Last Admin   • [START ON 2021] cefTRIAXone (ROCEPHIN) 2 g in sodium chloride 0.9 % 100 mL IVPB-VTB   2 g Intravenous Q24H aKren Stovall APRN       • sodium chloride 0.9 % flush 10 mL  10 mL Intravenous PRN Anthony Villagran, DO         Current Outpatient Medications   Medication Sig Dispense Refill   • aspirin 81 MG chewable tablet Chew 81 mg Daily.     • atorvastatin (LIPITOR) 80 MG tablet Take 80 mg by mouth Every Night.     • lamoTRIgine (LaMICtal) 100 MG tablet Take 100 mg by mouth 2 (Two) Times a Day.     • levothyroxine (SYNTHROID, LEVOTHROID) 50 MCG tablet Take 50 mcg by mouth Daily.     • LORazepam (ATIVAN) 0.5 MG tablet Take 0.5 mg by mouth Every 12 (Twelve) Hours.     • mirtazapine (REMERON) 7.5 MG half tablet Take 15 mg by mouth Every Night.     • NAMZARIC 28-10 MG capsule sustained-release 24 hr Take 1 tablet by mouth Every Night.     • potassium chloride (K-DUR,KLOR-CON) 10 MEQ CR tablet Take 10 mEq by mouth Daily.     • senna (senna) 8.6 MG tablet Take 1 tablet by mouth 2 (Two) Times a Day.     • sertraline (ZOLOFT) 25 MG tablet Take 75 mg by mouth Daily.     • topiramate (TOPAMAX) 25 MG tablet Take 25 mg by mouth 2 (Two) Times a Day.     • vitamin D (ERGOCALCIFEROL) 1.25 MG (61921 UT) capsule capsule Take 50,000 Units by mouth 1 (One) Time Per Week. PTA: tuesdays     • acetaminophen (TYLENOL) 500 MG tablet Take 500 mg by mouth Every 6 (Six) Hours As Needed for Mild Pain  or Fever.     • bisacodyl (DULCOLAX) 10 MG suppository Insert 10 mg into the rectum Daily As Needed for Constipation.         REVIEW OF SYSTEMS  CONSTITUTIONAL:  No fever, chills, night sweats or fatigue.  EYES:  No blurry vision, diplopia or other vision changes.  ENT:  No hearing loss, nosebleeds or sore throat.  CARDIOVASCULAR:  No palpitations, arrhythmia, syncopal episodes or edema.  PULMONARY:  No hemoptysis, wheezing, chronic cough or shortness of breath.  GASTROINTESTINAL:  No nausea or vomiting.  No constipation or diarrhea.  No abdominal pain.  GENITOURINARY:  No hematuria, kidney stones or frequent  "urination.  MUSCULOSKELETAL:  No joint or back pains.  INTEGUMENTARY: No rashes or pruritus.  ENDOCRINE:  No excessive thirst or hot flashes.  HEMATOLOGIC:  No history of free bleeding, spontaneous bleeding or clotting.  IMMUNOLOGIC:  No allergies or frequent infections.  NEUROLOGIC: No numbness, tingling, seizures or weakness.  PSYCHIATRIC:  No anxiety or depression.    PHYSICAL EXAMINATION    /70 (BP Location: Right arm, Patient Position: Lying)   Pulse 71   Temp 98.1 °F (36.7 °C) (Oral)   Resp 18   Ht 162.6 cm (64\")   Wt 94.8 kg (209 lb)   LMP  (LMP Unknown)   SpO2 96%   BMI 35.87 kg/m²     GENERAL:  A obese white female who is sedated.  HEENT:  Pupils equally round and reactive to light.  Extraocular muscles intact.  CARDIOVASCULAR:  Regular rate and rhythm.  No murmurs, gallops or rubs.  LUNGS:  Clear to auscultation bilaterally.  ABDOMEN:  Soft, nontender, nondistended with positive bowel sounds.  EXTREMITIES:  No clubbing, cyanosis or edema bilaterally.  SKIN:  No rashes or petechiae.  NEURO:  Cranial nerves grossly intact.  No focal deficits.  PSYCH:  Alert and oriented x3.    LABORATORY     WBC   Date Value Ref Range Status   12/16/2021 6.70 3.40 - 10.80 10*3/mm3 Final     RBC   Date Value Ref Range Status   12/16/2021 4.46 3.77 - 5.28 10*6/mm3 Final     Hemoglobin   Date Value Ref Range Status   12/16/2021 12.7 12.0 - 15.9 g/dL Final     Hematocrit   Date Value Ref Range Status   12/16/2021 40.9 34.0 - 46.6 % Final     MCV   Date Value Ref Range Status   12/16/2021 91.7 79.0 - 97.0 fL Final     MCH   Date Value Ref Range Status   12/16/2021 28.5 26.6 - 33.0 pg Final     MCHC   Date Value Ref Range Status   12/16/2021 31.1 (L) 31.5 - 35.7 g/dL Final     RDW   Date Value Ref Range Status   12/16/2021 14.4 12.3 - 15.4 % Final     RDW-SD   Date Value Ref Range Status   12/16/2021 48.6 37.0 - 54.0 fl Final     MPV   Date Value Ref Range Status   12/16/2021 10.1 6.0 - 12.0 fL Final     Platelets "   Date Value Ref Range Status   12/16/2021 193 140 - 450 10*3/mm3 Final     Neutrophil %   Date Value Ref Range Status   12/16/2021 62.4 42.7 - 76.0 % Final     Lymphocyte %   Date Value Ref Range Status   12/16/2021 28.4 19.6 - 45.3 % Final     Monocyte %   Date Value Ref Range Status   12/16/2021 7.2 5.0 - 12.0 % Final     Eosinophil %   Date Value Ref Range Status   12/16/2021 1.5 0.3 - 6.2 % Final     Basophil %   Date Value Ref Range Status   12/16/2021 0.4 0.0 - 1.5 % Final     Immature Grans %   Date Value Ref Range Status   12/16/2021 0.1 0.0 - 0.5 % Final     Neutrophils, Absolute   Date Value Ref Range Status   12/16/2021 4.18 1.70 - 7.00 10*3/mm3 Final     Lymphocytes, Absolute   Date Value Ref Range Status   12/16/2021 1.90 0.70 - 3.10 10*3/mm3 Final     Monocytes, Absolute   Date Value Ref Range Status   12/16/2021 0.48 0.10 - 0.90 10*3/mm3 Final     Eosinophils, Absolute   Date Value Ref Range Status   12/16/2021 0.10 0.00 - 0.40 10*3/mm3 Final     Basophils, Absolute   Date Value Ref Range Status   12/16/2021 0.03 0.00 - 0.20 10*3/mm3 Final     Immature Grans, Absolute   Date Value Ref Range Status   12/16/2021 0.01 0.00 - 0.05 10*3/mm3 Final     nRBC   Date Value Ref Range Status   12/16/2021 0.0 0.0 - 0.2 /100 WBC Final       Glucose   Date Value Ref Range Status   12/16/2021 270 (H) 65 - 99 mg/dL Final     Sodium   Date Value Ref Range Status   12/16/2021 141 136 - 145 mmol/L Final     Potassium   Date Value Ref Range Status   12/16/2021 4.4 3.5 - 5.2 mmol/L Final     CO2   Date Value Ref Range Status   12/16/2021 24.5 22.0 - 29.0 mmol/L Final     Chloride   Date Value Ref Range Status   12/16/2021 107 98 - 107 mmol/L Final     Anion Gap   Date Value Ref Range Status   12/16/2021 9.5 5.0 - 15.0 mmol/L Final     Creatinine   Date Value Ref Range Status   12/16/2021 1.22 (H) 0.57 - 1.00 mg/dL Final     BUN   Date Value Ref Range Status   12/16/2021 21 8 - 23 mg/dL Final     BUN/Creatinine Ratio    Date Value Ref Range Status   12/16/2021 17.2 7.0 - 25.0 Final     Calcium   Date Value Ref Range Status   12/16/2021 9.5 8.6 - 10.5 mg/dL Final     eGFR Non  Amer   Date Value Ref Range Status   12/16/2021 42 (L) >60 mL/min/1.73 Final     Alkaline Phosphatase   Date Value Ref Range Status   12/16/2021 139 (H) 39 - 117 U/L Final     Total Protein   Date Value Ref Range Status   12/16/2021 6.6 6.0 - 8.5 g/dL Final     ALT (SGPT)   Date Value Ref Range Status   12/16/2021 9 1 - 33 U/L Final     AST (SGOT)   Date Value Ref Range Status   12/16/2021 13 1 - 32 U/L Final     Total Bilirubin   Date Value Ref Range Status   12/16/2021 0.3 0.0 - 1.2 mg/dL Final     Albumin   Date Value Ref Range Status   12/16/2021 3.77 3.50 - 5.20 g/dL Final     Globulin   Date Value Ref Range Status   12/16/2021 2.8 gm/dL Final       No results found for: MG, PHOS  No results found for: LDH, URICACID     IMAGING  Imaging Results (Last 72 Hours)     Procedure Component Value Units Date/Time    CT Abdomen Pelvis Without Contrast [[id number]] Collected: 12/16/21 2344     Updated: 12/16/21 2346    Narrative:      CT Abdomen Pelvis WO    INDICATION:   Abdominal pain.    TECHNIQUE:   CT of the abdomen and pelvis without IV contrast. Coronal and sagittal reconstructions were obtained.  Radiation dose reduction techniques included automated exposure control or exposure modulation based on body size. Count of known CT and cardiac nuc  med studies performed in previous 12 months: 0.     COMPARISON:   3/7/2005    FINDINGS:  There is mild atelectasis in the lung bases. There is atherosclerotic disease with no aortic aneurysm. Tiny gallstone may be present near the neck of the gallbladder. The gallbladder is otherwise normal. No biliary obstruction is seen. There are  bilateral renal cysts. There is a large stone in the right renal pelvis measuring 10 x 7 x 12 mm in size. This is near the UPJ and there is some minimal dilatation of the right  renal collecting system which may reflect some intermittent obstruction. No  other kidney stones are seen, and there are no stones in the ureters on either side. The urinary bladder is nearly completely decompressed, but there is some adjacent fat stranding concerning for cystitis. Stranding around the right kidney could reflect  pyelonephritis or could be secondary to obstruction. Remaining solid organs are normal.    Uterus is surgically absent. Large bowel is normal with no evidence of acute colitis. The appendix is not identified, but there is no evidence of acute appendicitis. No small bowel obstruction is seen. The stomach is normal. There is no adenopathy or  free fluid. Mild chronic compression fractures are present at L2 and L3.      Impression:        1. Stranding around the urinary bladder concerning for cystitis.  2. Large stone in the right renal pelvis near the UPJ minimal right hydronephrosis. This could be due to intermittent obstruction. There is some stranding around the right kidney which could be due to obstruction or pyelonephritis. No other stones are  seen.  3. No bowel obstruction or acute GI tract inflammation.  4. Probable tiny gallstone in an otherwise normal-appearing gallbladder.  5. Hysterectomy.          Signer Name: Sameer Rice MD   Signed: 12/16/2021 11:44 PM   Workstation Name: [facility]    [facility]          PATHOLOGY  * Cannot find OR log *    IMPRESSION AND PLAN  Luz Beasley is a 81 y.o., white female with:  #1.-Pyelonephritis-recommend treatment as appropriate  #2.-Right renal calculus-its intermittently obstructing.  Does not need a stent at this time due to the size of the stone I am going to recommend surgical intervention after infections been resolved    The patient was in agreement with these plans.    Thank you for asking us to participate in Luz Beasley's care.  We will continue to follow with you.  Please do not hesitate to call with any  questions or concerns that you may have.    A total of 60 minutes were spent coordinating this patient’s care in clinic today; 30 minutes of which were face-to-face with the patient, reviewing medical history and counseling on the current treatment and followup plan.  All questions were answered to patient's satisfaction.       This document was signed by Yusef Willson MD December 17, 2021 09:25 EST

## 2021-12-21 LAB
BACTERIA SPEC AEROBE CULT: ABNORMAL
BACTERIA SPEC AEROBE CULT: NORMAL
BACTERIA SPEC AEROBE CULT: NORMAL

## 2021-12-30 ENCOUNTER — OFFICE VISIT (OUTPATIENT)
Dept: UROLOGY | Facility: CLINIC | Age: 81
End: 2021-12-30

## 2021-12-30 VITALS — WEIGHT: 209 LBS | BODY MASS INDEX: 35.68 KG/M2 | HEIGHT: 64 IN

## 2021-12-30 DIAGNOSIS — N20.0 RENAL CALCULUS, RIGHT: Primary | ICD-10-CM

## 2021-12-30 PROCEDURE — 99213 OFFICE O/P EST LOW 20 MIN: CPT | Performed by: UROLOGY

## 2021-12-30 NOTE — PROGRESS NOTES
Chief Complaint:          Chief Complaint   Patient presents with   • Hospital Follow Up Visit     pyelonephritis        HPI:   81 y.o. female returns today.  She is actually incarcerated in a nursing home.  She was seen as an urgent consultation while hospitalized and was found to have a right-sided pyelonephritis.  She had a minimally of obstructing right renal pelvic stone.  She had a normal white count.  I recommended that we see her as an outpatient.  She had a right UPJ stone I recommended intervention.  Renal calculus-we discussed the presence of the stone. We discussed the various therapeutic options available including percutaneous nephrostolithotomy, ureteroscopy and extracorporeal shockwave  lithotripsy.  We discussed the risks of lithotripsy including the passage of stones, the development of a large string of stones in the distal ureter known as Steinstrasse.  In the 3% incidence of that we will need to proceed with a ureteroscopy for obstructing fragments.  Extremely rare incidence of renal hematoma and the significance of this.  We discussed percutaneous nephrostolithotomy and its use as well as the risks and benefits such as the need for postoperative hospitalization, and the risk of damage to the kidney and the remote risk of a nephrectomy.  We also discussed the use of ureteroscopy in the upper tracts.  That this is as a decreased success rate to completely remove the stones on the first option and that very likely a stent will be required requiring an additional procedure for removal.  We discussed the absolute relative indicators for intervention including the presence of sepsis and pain we cannot control ais the primary need for urgent intervention.  We discussed placement of a stent if indicated and the management of the stent as well.      Past Medical History:        Past Medical History:   Diagnosis Date   • Dementia (HCC)    • Depression    • Diabetes mellitus (HCC)    • Hyperlipidemia    •  Hypertension    • Obesity    • Senile dementia (HCC)    • Vitamin D deficiency disease          Current Meds:     Current Outpatient Medications   Medication Sig Dispense Refill   • acetaminophen (TYLENOL) 500 MG tablet Take 500 mg by mouth Every 6 (Six) Hours As Needed for Mild Pain  or Fever.     • aspirin 81 MG chewable tablet Chew 81 mg Daily.     • atorvastatin (LIPITOR) 80 MG tablet Take 80 mg by mouth Every Night.     • bisacodyl (DULCOLAX) 10 MG suppository Insert 10 mg into the rectum Daily As Needed for Constipation.     • lamoTRIgine (LaMICtal) 100 MG tablet Take 100 mg by mouth 2 (Two) Times a Day.     • levothyroxine (SYNTHROID, LEVOTHROID) 50 MCG tablet Take 50 mcg by mouth Daily.     • LORazepam (ATIVAN) 0.5 MG tablet Take 0.5 mg by mouth Every 12 (Twelve) Hours.     • mirtazapine (REMERON) 7.5 MG half tablet Take 7.5 mg by mouth Every Night.     • NAMZARIC 28-10 MG capsule sustained-release 24 hr Take 1 tablet by mouth Every Night.     • potassium chloride (K-DUR,KLOR-CON) 10 MEQ CR tablet Take 10 mEq by mouth Daily.     • senna (senna) 8.6 MG tablet Take 1 tablet by mouth 2 (Two) Times a Day.     • sertraline (ZOLOFT) 25 MG tablet Take 75 mg by mouth Daily.     • topiramate (TOPAMAX) 25 MG tablet Take 25 mg by mouth 2 (Two) Times a Day.     • vitamin D (ERGOCALCIFEROL) 1.25 MG (03688 UT) capsule capsule Take 50,000 Units by mouth 1 (One) Time Per Week. PTA: tuesdays       No current facility-administered medications for this visit.        Allergies:      No Known Allergies     Past Surgical History:     Past Surgical History:   Procedure Laterality Date   • CARDIAC CATHETERIZATION  05/05/2006   • CARDIOVASCULAR STRESS TEST  05/05/2006   • COLONOSCOPY  03/21/2008   • HYSTERECTOMY     • THYROID SURGERY           Social History:     Social History     Socioeconomic History   • Marital status:    Tobacco Use   • Smoking status: Never Smoker   • Smokeless tobacco: Never Used   Vaping Use   •  Vaping Use: Never used   Substance and Sexual Activity   • Alcohol use: No   • Drug use: No   • Sexual activity: Defer       Family History:     Family History   Family history unknown: Yes       Review of Systems:     Review of Systems   Constitutional: Negative.  Negative for activity change, appetite change, chills, diaphoresis, fatigue and unexpected weight change.   HENT: Negative for congestion, dental problem, drooling, ear discharge, ear pain, facial swelling, hearing loss, mouth sores, nosebleeds, postnasal drip, rhinorrhea, sinus pressure, sneezing, sore throat, tinnitus, trouble swallowing and voice change.    Eyes: Negative.  Negative for photophobia, pain, discharge, redness, itching and visual disturbance.   Respiratory: Negative.  Negative for apnea, cough, choking, chest tightness, shortness of breath, wheezing and stridor.    Cardiovascular: Negative.  Negative for chest pain, palpitations and leg swelling.   Gastrointestinal: Negative.  Negative for abdominal distention, abdominal pain, anal bleeding, blood in stool, constipation, diarrhea, nausea, rectal pain and vomiting.   Endocrine: Negative.  Negative for cold intolerance, heat intolerance, polydipsia, polyphagia and polyuria.   Musculoskeletal: Negative.  Negative for arthralgias, back pain, gait problem, joint swelling, myalgias, neck pain and neck stiffness.   Skin: Negative.  Negative for color change, pallor, rash and wound.   Allergic/Immunologic: Negative.  Negative for environmental allergies, food allergies and immunocompromised state.   Neurological: Negative.  Negative for dizziness, tremors, seizures, syncope, facial asymmetry, speech difficulty, weakness, light-headedness, numbness and headaches.   Hematological: Negative.  Negative for adenopathy. Does not bruise/bleed easily.   Psychiatric/Behavioral: Negative for agitation, behavioral problems, confusion, decreased concentration, dysphoric mood, hallucinations, self-injury,  sleep disturbance and suicidal ideas. The patient is not nervous/anxious and is not hyperactive.    All other systems reviewed and are negative.      Physical Exam:     Physical Exam  Constitutional:       Appearance: She is well-developed.   HENT:      Head: Normocephalic and atraumatic.      Right Ear: External ear normal.      Left Ear: External ear normal.   Eyes:      Conjunctiva/sclera: Conjunctivae normal.      Pupils: Pupils are equal, round, and reactive to light.   Cardiovascular:      Rate and Rhythm: Normal rate and regular rhythm.      Heart sounds: Normal heart sounds.   Pulmonary:      Effort: Pulmonary effort is normal.      Breath sounds: Normal breath sounds.   Abdominal:      General: Bowel sounds are normal. There is no distension.      Palpations: Abdomen is soft. There is no mass.      Tenderness: There is no abdominal tenderness. There is no guarding or rebound.   Genitourinary:     Vagina: No vaginal discharge.   Musculoskeletal:         General: Normal range of motion.   Skin:     General: Skin is warm and dry.   Neurological:      Mental Status: She is alert.      Deep Tendon Reflexes: Reflexes are normal and symmetric.   Psychiatric:         Behavior: Behavior normal.         Thought Content: Thought content normal.         Judgment: Judgment normal.         I have reviewed the following portions of the patient's history: Allergies, current medications, past family history, past medical history, past social history, past surgical history, problem list, and ROS and confirm it is accurate.      Procedure:       Assessment/Plan:   Renal calculus-we discussed the presence of the stone. We discussed the various therapeutic options available including percutaneous nephrostolithotomy, ureteroscopy and extracorporeal shockwave  lithotripsy.  We discussed the risks of lithotripsy including the passage of stones, the development of a large string of stones in the distal ureter known as Steinstrasse.   In the 3% incidence of that we will need to proceed with a ureteroscopy for obstructing fragments.  Extremely rare incidence of renal hematoma and the significance of this.  We discussed percutaneous nephrostolithotomy and its use as well as the risks and benefits such as the need for postoperative hospitalization, and the risk of damage to the kidney and the remote risk of a nephrectomy.  We also discussed the use of ureteroscopy in the upper tracts.  That this is as a decreased success rate to completely remove the stones on the first option and that very likely a stent will be required requiring an additional procedure for removal.  We discussed the absolute relative indicators for intervention including the presence of sepsis and pain we cannot control ais the primary need for urgent intervention.  We discussed placement of a stent if indicated and the management of the stent as well.  For elective lithotripsy.      Patient reports that she is not currently experiencing any symptoms of urinary incontinence.                    This document has been electronically signed by ASAEL LEAL MD December 30, 2021 10:53 EST

## 2021-12-31 PROBLEM — N20.0 RENAL CALCULUS, RIGHT: Status: ACTIVE | Noted: 2021-12-31

## 2022-01-05 DIAGNOSIS — N20.0 RENAL CALCULUS, RIGHT: Primary | ICD-10-CM

## 2022-01-05 RX ORDER — GENTAMICIN SULFATE 80 MG/100ML
80 INJECTION, SOLUTION INTRAVENOUS ONCE
Status: CANCELLED | OUTPATIENT
Start: 2022-01-28 | End: 2022-01-05

## 2022-01-06 DIAGNOSIS — N20.0 RENAL CALCULUS, RIGHT: Primary | ICD-10-CM

## 2022-01-26 ENCOUNTER — LAB REQUISITION (OUTPATIENT)
Dept: LAB | Facility: HOSPITAL | Age: 82
End: 2022-01-26

## 2022-01-26 ENCOUNTER — LAB (OUTPATIENT)
Dept: LAB | Facility: HOSPITAL | Age: 82
End: 2022-01-26

## 2022-01-26 DIAGNOSIS — N20.0 RENAL CALCULUS, RIGHT: ICD-10-CM

## 2022-01-26 DIAGNOSIS — Z11.52 ENCOUNTER FOR SCREENING FOR COVID-19: ICD-10-CM

## 2022-01-26 LAB
ANION GAP SERPL CALCULATED.3IONS-SCNC: 6.7 MMOL/L (ref 5–15)
BASOPHILS # BLD AUTO: 0.03 10*3/MM3 (ref 0–0.2)
BASOPHILS NFR BLD AUTO: 0.4 % (ref 0–1.5)
BUN SERPL-MCNC: 30 MG/DL (ref 8–23)
BUN/CREAT SERPL: 25.6 (ref 7–25)
CALCIUM SPEC-SCNC: 9.4 MG/DL (ref 8.6–10.5)
CHLORIDE SERPL-SCNC: 112 MMOL/L (ref 98–107)
CO2 SERPL-SCNC: 26.3 MMOL/L (ref 22–29)
CREAT SERPL-MCNC: 1.17 MG/DL (ref 0.57–1)
DEPRECATED RDW RBC AUTO: 49.1 FL (ref 37–54)
EOSINOPHIL # BLD AUTO: 0.34 10*3/MM3 (ref 0–0.4)
EOSINOPHIL NFR BLD AUTO: 4.8 % (ref 0.3–6.2)
ERYTHROCYTE [DISTWIDTH] IN BLOOD BY AUTOMATED COUNT: 14.3 % (ref 12.3–15.4)
GFR SERPL CREATININE-BSD FRML MDRD: 44 ML/MIN/1.73
GLUCOSE SERPL-MCNC: 206 MG/DL (ref 65–99)
HCT VFR BLD AUTO: 39.7 % (ref 34–46.6)
HGB BLD-MCNC: 12.1 G/DL (ref 12–15.9)
IMM GRANULOCYTES # BLD AUTO: 0.02 10*3/MM3 (ref 0–0.05)
IMM GRANULOCYTES NFR BLD AUTO: 0.3 % (ref 0–0.5)
LYMPHOCYTES # BLD AUTO: 2.3 10*3/MM3 (ref 0.7–3.1)
LYMPHOCYTES NFR BLD AUTO: 32.7 % (ref 19.6–45.3)
MCH RBC QN AUTO: 28.4 PG (ref 26.6–33)
MCHC RBC AUTO-ENTMCNC: 30.5 G/DL (ref 31.5–35.7)
MCV RBC AUTO: 93.2 FL (ref 79–97)
MONOCYTES # BLD AUTO: 0.44 10*3/MM3 (ref 0.1–0.9)
MONOCYTES NFR BLD AUTO: 6.3 % (ref 5–12)
NEUTROPHILS NFR BLD AUTO: 3.9 10*3/MM3 (ref 1.7–7)
NEUTROPHILS NFR BLD AUTO: 55.5 % (ref 42.7–76)
NRBC BLD AUTO-RTO: 0 /100 WBC (ref 0–0.2)
PLATELET # BLD AUTO: 170 10*3/MM3 (ref 140–450)
PMV BLD AUTO: 10.4 FL (ref 6–12)
POTASSIUM SERPL-SCNC: 3.8 MMOL/L (ref 3.5–5.2)
RBC # BLD AUTO: 4.26 10*6/MM3 (ref 3.77–5.28)
SARS-COV-2 RNA PNL SPEC NAA+PROBE: NOT DETECTED
SODIUM SERPL-SCNC: 145 MMOL/L (ref 136–145)
WBC NRBC COR # BLD: 7.03 10*3/MM3 (ref 3.4–10.8)

## 2022-01-26 PROCEDURE — 85025 COMPLETE CBC W/AUTO DIFF WBC: CPT | Performed by: INTERNAL MEDICINE

## 2022-01-26 PROCEDURE — 87635 SARS-COV-2 COVID-19 AMP PRB: CPT | Performed by: INTERNAL MEDICINE

## 2022-01-26 PROCEDURE — 80048 BASIC METABOLIC PNL TOTAL CA: CPT | Performed by: INTERNAL MEDICINE

## 2022-01-28 ENCOUNTER — HOSPITAL ENCOUNTER (OUTPATIENT)
Facility: HOSPITAL | Age: 82
Setting detail: HOSPITAL OUTPATIENT SURGERY
Discharge: HOME | End: 2022-01-28
Attending: UROLOGY | Admitting: UROLOGY

## 2022-01-28 ENCOUNTER — APPOINTMENT (OUTPATIENT)
Dept: GENERAL RADIOLOGY | Facility: HOSPITAL | Age: 82
End: 2022-01-28

## 2022-01-28 ENCOUNTER — ANESTHESIA EVENT (OUTPATIENT)
Dept: PERIOP | Facility: HOSPITAL | Age: 82
End: 2022-01-28

## 2022-01-28 ENCOUNTER — ANESTHESIA (OUTPATIENT)
Dept: PERIOP | Facility: HOSPITAL | Age: 82
End: 2022-01-28

## 2022-01-28 VITALS
SYSTOLIC BLOOD PRESSURE: 126 MMHG | HEART RATE: 85 BPM | TEMPERATURE: 97.7 F | OXYGEN SATURATION: 98 % | DIASTOLIC BLOOD PRESSURE: 60 MMHG | RESPIRATION RATE: 17 BRPM

## 2022-01-28 DIAGNOSIS — N20.0 RENAL CALCULUS, RIGHT: ICD-10-CM

## 2022-01-28 LAB
GLUCOSE BLDC GLUCOMTR-MCNC: 120 MG/DL (ref 70–130)
GLUCOSE BLDC GLUCOMTR-MCNC: 139 MG/DL (ref 70–130)

## 2022-01-28 PROCEDURE — 50590 FRAGMENTING OF KIDNEY STONE: CPT | Performed by: UROLOGY

## 2022-01-28 PROCEDURE — 82962 GLUCOSE BLOOD TEST: CPT

## 2022-01-28 PROCEDURE — 25010000002 ONDANSETRON PER 1 MG: Performed by: NURSE ANESTHETIST, CERTIFIED REGISTERED

## 2022-01-28 PROCEDURE — 74018 RADEX ABDOMEN 1 VIEW: CPT | Performed by: RADIOLOGY

## 2022-01-28 PROCEDURE — 25010000002 PROPOFOL 10 MG/ML EMULSION: Performed by: NURSE ANESTHETIST, CERTIFIED REGISTERED

## 2022-01-28 PROCEDURE — 74018 RADEX ABDOMEN 1 VIEW: CPT

## 2022-01-28 PROCEDURE — 25010000002 DEXAMETHASONE PER 1 MG: Performed by: NURSE ANESTHETIST, CERTIFIED REGISTERED

## 2022-01-28 PROCEDURE — 25010000002 GENTAMICIN PER 80 MG: Performed by: UROLOGY

## 2022-01-28 RX ORDER — PROPOFOL 10 MG/ML
VIAL (ML) INTRAVENOUS AS NEEDED
Status: DISCONTINUED | OUTPATIENT
Start: 2022-01-28 | End: 2022-01-28 | Stop reason: SURG

## 2022-01-28 RX ORDER — SODIUM CHLORIDE 0.9 % (FLUSH) 0.9 %
10 SYRINGE (ML) INJECTION EVERY 12 HOURS SCHEDULED
Status: DISCONTINUED | OUTPATIENT
Start: 2022-01-28 | End: 2022-01-28 | Stop reason: HOSPADM

## 2022-01-28 RX ORDER — MEPERIDINE HYDROCHLORIDE 25 MG/ML
12.5 INJECTION INTRAMUSCULAR; INTRAVENOUS; SUBCUTANEOUS
Status: DISCONTINUED | OUTPATIENT
Start: 2022-01-28 | End: 2022-01-28 | Stop reason: HOSPADM

## 2022-01-28 RX ORDER — SODIUM CHLORIDE, SODIUM LACTATE, POTASSIUM CHLORIDE, CALCIUM CHLORIDE 600; 310; 30; 20 MG/100ML; MG/100ML; MG/100ML; MG/100ML
125 INJECTION, SOLUTION INTRAVENOUS ONCE
Status: DISCONTINUED | OUTPATIENT
Start: 2022-01-28 | End: 2022-01-28 | Stop reason: HOSPADM

## 2022-01-28 RX ORDER — OXYCODONE HYDROCHLORIDE AND ACETAMINOPHEN 5; 325 MG/1; MG/1
1 TABLET ORAL ONCE AS NEEDED
Status: DISCONTINUED | OUTPATIENT
Start: 2022-01-28 | End: 2022-01-28 | Stop reason: HOSPADM

## 2022-01-28 RX ORDER — SODIUM CHLORIDE, SODIUM LACTATE, POTASSIUM CHLORIDE, CALCIUM CHLORIDE 600; 310; 30; 20 MG/100ML; MG/100ML; MG/100ML; MG/100ML
INJECTION, SOLUTION INTRAVENOUS CONTINUOUS PRN
Status: DISCONTINUED | OUTPATIENT
Start: 2022-01-28 | End: 2022-01-28 | Stop reason: SURG

## 2022-01-28 RX ORDER — SODIUM CHLORIDE 0.9 % (FLUSH) 0.9 %
10 SYRINGE (ML) INJECTION AS NEEDED
Status: DISCONTINUED | OUTPATIENT
Start: 2022-01-28 | End: 2022-01-28 | Stop reason: HOSPADM

## 2022-01-28 RX ORDER — DROPERIDOL 2.5 MG/ML
0.62 INJECTION, SOLUTION INTRAMUSCULAR; INTRAVENOUS ONCE AS NEEDED
Status: DISCONTINUED | OUTPATIENT
Start: 2022-01-28 | End: 2022-01-28 | Stop reason: HOSPADM

## 2022-01-28 RX ORDER — ONDANSETRON 2 MG/ML
4 INJECTION INTRAMUSCULAR; INTRAVENOUS AS NEEDED
Status: DISCONTINUED | OUTPATIENT
Start: 2022-01-28 | End: 2022-01-28 | Stop reason: HOSPADM

## 2022-01-28 RX ORDER — FENTANYL CITRATE 50 UG/ML
50 INJECTION, SOLUTION INTRAMUSCULAR; INTRAVENOUS
Status: DISCONTINUED | OUTPATIENT
Start: 2022-01-28 | End: 2022-01-28 | Stop reason: HOSPADM

## 2022-01-28 RX ORDER — FAMOTIDINE 10 MG/ML
INJECTION, SOLUTION INTRAVENOUS AS NEEDED
Status: DISCONTINUED | OUTPATIENT
Start: 2022-01-28 | End: 2022-01-28 | Stop reason: SURG

## 2022-01-28 RX ORDER — IPRATROPIUM BROMIDE AND ALBUTEROL SULFATE 2.5; .5 MG/3ML; MG/3ML
3 SOLUTION RESPIRATORY (INHALATION) ONCE AS NEEDED
Status: DISCONTINUED | OUTPATIENT
Start: 2022-01-28 | End: 2022-01-28 | Stop reason: HOSPADM

## 2022-01-28 RX ORDER — HYDROCODONE BITARTRATE AND ACETAMINOPHEN 10; 325 MG/1; MG/1
1 TABLET ORAL EVERY 4 HOURS PRN
Qty: 10 TABLET | Refills: 0 | Status: SHIPPED | OUTPATIENT
Start: 2022-01-28 | End: 2023-01-03

## 2022-01-28 RX ORDER — SODIUM CHLORIDE, SODIUM LACTATE, POTASSIUM CHLORIDE, CALCIUM CHLORIDE 600; 310; 30; 20 MG/100ML; MG/100ML; MG/100ML; MG/100ML
100 INJECTION, SOLUTION INTRAVENOUS ONCE AS NEEDED
Status: DISCONTINUED | OUTPATIENT
Start: 2022-01-28 | End: 2022-01-28 | Stop reason: HOSPADM

## 2022-01-28 RX ORDER — GENTAMICIN SULFATE 80 MG/100ML
80 INJECTION, SOLUTION INTRAVENOUS ONCE
Status: COMPLETED | OUTPATIENT
Start: 2022-01-28 | End: 2022-01-28

## 2022-01-28 RX ORDER — DEXAMETHASONE SODIUM PHOSPHATE 4 MG/ML
INJECTION, SOLUTION INTRA-ARTICULAR; INTRALESIONAL; INTRAMUSCULAR; INTRAVENOUS; SOFT TISSUE AS NEEDED
Status: DISCONTINUED | OUTPATIENT
Start: 2022-01-28 | End: 2022-01-28 | Stop reason: SURG

## 2022-01-28 RX ORDER — MIDAZOLAM HYDROCHLORIDE 1 MG/ML
0.5 INJECTION INTRAMUSCULAR; INTRAVENOUS
Status: DISCONTINUED | OUTPATIENT
Start: 2022-01-28 | End: 2022-01-28 | Stop reason: HOSPADM

## 2022-01-28 RX ORDER — ONDANSETRON 2 MG/ML
INJECTION INTRAMUSCULAR; INTRAVENOUS AS NEEDED
Status: DISCONTINUED | OUTPATIENT
Start: 2022-01-28 | End: 2022-01-28 | Stop reason: SURG

## 2022-01-28 RX ADMIN — SODIUM CHLORIDE, POTASSIUM CHLORIDE, SODIUM LACTATE AND CALCIUM CHLORIDE: 600; 310; 30; 20 INJECTION, SOLUTION INTRAVENOUS at 10:16

## 2022-01-28 RX ADMIN — EPHEDRINE SULFATE 10 MG: 50 INJECTION, SOLUTION INTRAVENOUS at 10:44

## 2022-01-28 RX ADMIN — FAMOTIDINE 20 MG: 10 INJECTION INTRAVENOUS at 10:44

## 2022-01-28 RX ADMIN — GENTAMICIN SULFATE 80 MG: 80 INJECTION, SOLUTION INTRAVENOUS at 10:16

## 2022-01-28 RX ADMIN — ONDANSETRON 4 MG: 2 INJECTION INTRAMUSCULAR; INTRAVENOUS at 10:44

## 2022-01-28 RX ADMIN — DEXAMETHASONE SODIUM PHOSPHATE 4 MG: 4 INJECTION, SOLUTION INTRA-ARTICULAR; INTRALESIONAL; INTRAMUSCULAR; INTRAVENOUS; SOFT TISSUE at 10:44

## 2022-01-28 RX ADMIN — EPHEDRINE SULFATE 10 MG: 50 INJECTION, SOLUTION INTRAVENOUS at 10:33

## 2022-01-28 RX ADMIN — PROPOFOL 30 MG: 10 INJECTION, EMULSION INTRAVENOUS at 10:22

## 2022-01-28 NOTE — ANESTHESIA POSTPROCEDURE EVALUATION
Patient: Luz Beasley    Procedure Summary     Date: 01/28/22 Room / Location: [facility] OR  / [facility] OR    Anesthesia Start: 1016 Anesthesia Stop: 1108    Procedure: EXTRACORPOREAL SHOCKWAVE LITHOTRIPSY (Right ) Diagnosis:       Renal calculus, right      (Renal calculus, right [N20.0])    Surgeons: Yusef Willson MD Provider: Lavell Her MD    Anesthesia Type: general ASA Status: 3          Anesthesia Type: general    Vitals  Vitals Value Taken Time   /53 01/28/22 1138   Temp 97.9 °F (36.6 °C) 01/28/22 1108   Pulse 76 01/28/22 1138   Resp 15 01/28/22 1138   SpO2 95 % 01/28/22 1138           Post Anesthesia Care and Evaluation    Patient location during evaluation: PHASE II  Patient participation: complete - patient participated  Level of consciousness: awake and alert  Pain score: 1  Pain management: adequate  Airway patency: patent  Anesthetic complications: No anesthetic complications  PONV Status: controlled  Cardiovascular status: acceptable  Respiratory status: acceptable  Hydration status: acceptable

## 2022-01-28 NOTE — ANESTHESIA PROCEDURE NOTES
Airway  Urgency: elective    Date/Time: 1/28/2022 10:22 AM  Airway not difficult    General Information and Staff    Patient location during procedure: OR  Anesthesiologist: Lavell Her MD  CRNA: Emma Chaves CRNA    Indications and Patient Condition  Indications for airway management: airway protection    Preoxygenated: yes  MILS maintained throughout  Mask difficulty assessment: 1 - vent by mask    Final Airway Details  Final airway type: supraglottic airway      Successful airway: classic  Size 4    Number of attempts at approach: 1  Assessment: lips, teeth, and gum same as pre-op and atraumatic intubation    Additional Comments  AOI X 1 PASS +BBS, +ETCO2, +R//F/C MOUTH TEETH GUMS SAME AS PREOP

## 2022-01-28 NOTE — ANESTHESIA POSTPROCEDURE EVALUATION
Patient: Luz Beasley    Procedure Summary     Date: 01/28/22 Room / Location: [facility] OR  / [facility] OR    Anesthesia Start: 1016 Anesthesia Stop: 1108    Procedure: EXTRACORPOREAL SHOCKWAVE LITHOTRIPSY (Right ) Diagnosis:       Renal calculus, right      (Renal calculus, right [N20.0])    Surgeons: Yusef Willson MD Provider: Lavell Her MD    Anesthesia Type: general ASA Status: 3          Anesthesia Type: general    Vitals  Vitals Value Taken Time   /53 01/28/22 1138   Temp 97.9 °F (36.6 °C) 01/28/22 1108   Pulse 76 01/28/22 1138   Resp 15 01/28/22 1138   SpO2 95 % 01/28/22 1138           Post Anesthesia Care and Evaluation    Patient location during evaluation: PHASE II  Patient participation: complete - patient participated  Level of consciousness: awake and alert  Pain score: 0  Pain management: adequate  Airway patency: patent  Anesthetic complications: No anesthetic complications    Cardiovascular status: acceptable  Respiratory status: acceptable  Hydration status: acceptable

## 2022-01-28 NOTE — ANESTHESIA PREPROCEDURE EVALUATION
Anesthesia Evaluation     no history of anesthetic complications:  NPO Solid Status: > 8 hours  NPO Liquid Status: > 8 hours           Airway   Mallampati: II  TM distance: >3 FB  Neck ROM: full  No difficulty expected  Dental    (+) edentulous    Pulmonary - normal exam   Cardiovascular - normal exam    (+) hypertension, hyperlipidemia,       Neuro/Psych  (+) headaches, psychiatric history, dementia,     GI/Hepatic/Renal/Endo    (+) morbid obesity,  renal disease, diabetes mellitus, thyroid problem hypothyroidism    Musculoskeletal     Abdominal  - normal exam   Substance History      OB/GYN          Other        ROS/Med Hx Other: Hyperparathroidism                  Anesthesia Plan    ASA 3     general     intravenous induction     Anesthetic plan, all risks, benefits, and alternatives have been provided, discussed and informed consent has been obtained with: legal guardian.        CODE STATUS:

## 2022-03-03 ENCOUNTER — TELEPHONE (OUTPATIENT)
Dept: UROLOGY | Facility: CLINIC | Age: 82
End: 2022-03-03

## 2022-03-03 ENCOUNTER — HOSPITAL ENCOUNTER (OUTPATIENT)
Dept: GENERAL RADIOLOGY | Facility: HOSPITAL | Age: 82
Discharge: HOME | End: 2022-03-03

## 2022-03-03 DIAGNOSIS — N20.0 RENAL CALCULUS, RIGHT: ICD-10-CM

## 2022-03-03 NOTE — TELEPHONE ENCOUNTER
Spoke with patient's primary nurse-Tara who called from nursing facility regarding patient's plan of care.  She reports they had a KUB done from the mobile unit which only noted stool present.  She states she has numbness in the report or copy of patient's x-ray to facility.    Did explain to nurse patient is post right ESWL for 1.2 cm stone and the need to re-evaluate her imaging for kidney stones if her prior stone is completely resolved.  Explained importance of getting imaging done at [facility] facility direct visualization.    She verbalized understanding.  Patient's appointment will be rescheduled for lack of authorization by ambulance staff.

## 2022-03-10 ENCOUNTER — TELEPHONE (OUTPATIENT)
Dept: UROLOGY | Facility: CLINIC | Age: 82
End: 2022-03-10

## 2022-03-10 NOTE — TELEPHONE ENCOUNTER
Provider: GRISELDA CHENG-AKWA, APRN  Caller: CHRISTY   [facility]    Phone Number: [phone]  Reason for Call: CHRISTY IS FAXING DARYN XRAY THAT WAS DONE AT THEIR FACILITY TO PRACTICE FAX. PLEASE CALL HER BACK IF SHE NEEDS A FOLLOW UP APPOINTMENT. PATIENT DOES NEED TO ARRIVE BY EMS.

## 2022-11-16 ENCOUNTER — LAB REQUISITION (OUTPATIENT)
Dept: LAB | Facility: HOSPITAL | Age: 82
End: 2022-11-16

## 2022-11-16 DIAGNOSIS — E87.6 HYPOKALEMIA: ICD-10-CM

## 2022-11-16 LAB
ANION GAP SERPL CALCULATED.3IONS-SCNC: 12.9 MMOL/L (ref 5–15)
BUN SERPL-MCNC: 35 MG/DL (ref 8–23)
BUN/CREAT SERPL: 30.2 (ref 7–25)
CALCIUM SPEC-SCNC: 10 MG/DL (ref 8.6–10.5)
CHLORIDE SERPL-SCNC: 112 MMOL/L (ref 98–107)
CO2 SERPL-SCNC: 21.1 MMOL/L (ref 22–29)
CREAT SERPL-MCNC: 1.16 MG/DL (ref 0.57–1)
EGFRCR SERPLBLD CKD-EPI 2021: 47.2 ML/MIN/1.73
GLUCOSE SERPL-MCNC: 239 MG/DL (ref 65–99)
POTASSIUM SERPL-SCNC: 3.5 MMOL/L (ref 3.5–5.2)
SODIUM SERPL-SCNC: 146 MMOL/L (ref 136–145)

## 2022-11-16 PROCEDURE — 80048 BASIC METABOLIC PNL TOTAL CA: CPT | Performed by: INTERNAL MEDICINE

## 2022-12-27 ENCOUNTER — OFFICE VISIT (OUTPATIENT)
Dept: SURGERY | Facility: CLINIC | Age: 82
End: 2022-12-27

## 2022-12-27 DIAGNOSIS — E63.9 INADEQUATE NUTRITION: Primary | ICD-10-CM

## 2022-12-27 PROCEDURE — 99203 OFFICE O/P NEW LOW 30 MIN: CPT | Performed by: SURGERY

## 2022-12-27 RX ORDER — MEGESTROL ACETATE 40 MG/ML
400 SUSPENSION ORAL DAILY
COMMUNITY
Start: 2022-09-20

## 2022-12-27 RX ORDER — LINACLOTIDE 145 UG/1
145 CAPSULE, GELATIN COATED ORAL DAILY PRN
COMMUNITY
Start: 2022-12-19

## 2022-12-27 RX ORDER — SITAGLIPTIN 100 MG/1
100 TABLET, FILM COATED ORAL DAILY
COMMUNITY
Start: 2022-12-21

## 2022-12-27 RX ORDER — SERTRALINE HYDROCHLORIDE 25 MG/1
75 TABLET, FILM COATED ORAL NIGHTLY
COMMUNITY
Start: 2022-12-14

## 2022-12-27 NOTE — PROGRESS NOTES
Subjective   Luz Beasley is a 82 y.o. female.     Chief Complaint: inadequate nutrition    History of Present Illness She is a 81 yo nursing home resident with dementia who is not eating enough so a feeding tube has been requested. She is on aspirin. She cannot give a history, but apparently has had no prior gastric surgery.    The following portions of the patient's history were reviewed and updated as appropriate: current medications, past family history, past medical history, past social history, past surgical history and problem list.    Review of Systems Pt unable to provide    Objective   Physical Exam  Constitutional:       General: She is not in acute distress.     Appearance: She is not ill-appearing or toxic-appearing.   HENT:      Head: Normocephalic.      Right Ear: External ear normal.      Left Ear: External ear normal.      Nose: Nose normal.   Eyes:      General: No scleral icterus.     Pupils: Pupils are equal, round, and reactive to light.   Cardiovascular:      Rate and Rhythm: Normal rate and regular rhythm.      Pulses: Normal pulses.   Pulmonary:      Effort: Pulmonary effort is normal.      Breath sounds: Normal breath sounds.   Abdominal:      General: There is no distension.      Palpations: There is no mass.      Tenderness: There is no abdominal tenderness. There is no guarding.   Musculoskeletal:         General: No swelling.      Cervical back: No rigidity.   Skin:     Coloration: Skin is not jaundiced.   Neurological:      Mental Status: She is alert.         Past Medical History:   Diagnosis Date   • Anxiety    • Cluster headache    • Dementia (HCC)    • Depression    • Diabetes mellitus (HCC)    • Disease of thyroid gland    • Hx of sepsis    • Hyperlipidemia    • Hyperparathyroidism (HCC)    • Hypertension    • OA (osteoarthritis)    • Obesity    • Senile dementia (HCC)    • UTI (urinary tract infection)    • Vitamin D deficiency    • Vitamin D deficiency disease        Family  History   Family history unknown: Yes       Social History     Tobacco Use   • Smoking status: Never   • Smokeless tobacco: Never   Vaping Use   • Vaping Use: Never used   Substance Use Topics   • Alcohol use: No   • Drug use: No       Past Surgical History:   Procedure Laterality Date   • CARDIAC CATHETERIZATION  05/05/2006   • CARDIOVASCULAR STRESS TEST  05/05/2006   • COLONOSCOPY  03/21/2008   • EXTRACORPOREAL SHOCK WAVE LITHOTRIPSY (ESWL) Right 1/28/2022    Procedure: EXTRACORPOREAL SHOCKWAVE LITHOTRIPSY;  Surgeon: Yusef Willson MD;  Location: [facility];  Service: Urology;  Laterality: Right;   • HYSTERECTOMY     • THYROID SURGERY         Current Outpatient Medications   Medication Instructions   • acetaminophen (TYLENOL) 500 mg, Oral, Every 6 Hours PRN   • aspirin 81 mg, Oral, Daily   • atorvastatin (LIPITOR) 80 mg, Oral, Nightly   • bisacodyl (DULCOLAX) 10 mg, Rectal, Daily PRN   • HYDROcodone-acetaminophen (NORCO)  MG per tablet 1 tablet, Oral, Every 4 Hours PRN   • Januvia 100 MG tablet No dose, route, or frequency recorded.   • lamoTRIgine (LAMICTAL) 100 mg, Oral, 2 Times Daily   • levothyroxine (SYNTHROID, LEVOTHROID) 50 mcg, Oral, Daily   • Linzess 145 MCG capsule capsule No dose, route, or frequency recorded.   • LORazepam (ATIVAN) 0.5 mg, Oral, Every 12 Hours   • megestrol (MEGACE) 40 MG/ML suspension No dose, route, or frequency recorded.   • mirtazapine (REMERON) 7.5 MG tablet No dose, route, or frequency recorded.   • NAMZARIC 28-10 MG capsule sustained-release 24 hr 1 tablet, Oral, Nightly   • nystatin 295280 UNIT/GM powder No dose, route, or frequency recorded.   • potassium chloride (K-DUR,KLOR-CON) 10 MEQ CR tablet 10 mEq, Oral, Daily   • potassium chloride 10 MEQ CR tablet No dose, route, or frequency recorded.   • senna (SENOKOT) 8.6 MG tablet 1 tablet, Oral, 2 Times Daily   • sertraline (ZOLOFT) 100 MG tablet No dose, route, or frequency recorded.   • sertraline (ZOLOFT) 25 MG  tablet No dose, route, or frequency recorded.   • topiramate (TOPAMAX) 25 mg, Oral, 2 Times Daily   • vitamin D (ERGOCALCIFEROL) 50,000 Units, Oral, Weekly, PTA: tuesdays          Assessment & Plan   Diagnoses and all orders for this visit:    1. Inadequate nutrition (Primary)    Place PEG

## 2022-12-27 NOTE — H&P (VIEW-ONLY)
Subjective   Luz Beasley is a 82 y.o. female.     Chief Complaint: inadequate nutrition    History of Present Illness She is a 83 yo nursing home resident with dementia who is not eating enough so a feeding tube has been requested. She is on aspirin. She cannot give a history, but apparently has had no prior gastric surgery.    The following portions of the patient's history were reviewed and updated as appropriate: current medications, past family history, past medical history, past social history, past surgical history and problem list.    Review of Systems Pt unable to provide    Objective   Physical Exam  Constitutional:       General: She is not in acute distress.     Appearance: She is not ill-appearing or toxic-appearing.   HENT:      Head: Normocephalic.      Right Ear: External ear normal.      Left Ear: External ear normal.      Nose: Nose normal.   Eyes:      General: No scleral icterus.     Pupils: Pupils are equal, round, and reactive to light.   Cardiovascular:      Rate and Rhythm: Normal rate and regular rhythm.      Pulses: Normal pulses.   Pulmonary:      Effort: Pulmonary effort is normal.      Breath sounds: Normal breath sounds.   Abdominal:      General: There is no distension.      Palpations: There is no mass.      Tenderness: There is no abdominal tenderness. There is no guarding.   Musculoskeletal:         General: No swelling.      Cervical back: No rigidity.   Skin:     Coloration: Skin is not jaundiced.   Neurological:      Mental Status: She is alert.         Past Medical History:   Diagnosis Date   • Anxiety    • Cluster headache    • Dementia (HCC)    • Depression    • Diabetes mellitus (HCC)    • Disease of thyroid gland    • Hx of sepsis    • Hyperlipidemia    • Hyperparathyroidism (HCC)    • Hypertension    • OA (osteoarthritis)    • Obesity    • Senile dementia (HCC)    • UTI (urinary tract infection)    • Vitamin D deficiency    • Vitamin D deficiency disease        Family  History   Family history unknown: Yes       Social History     Tobacco Use   • Smoking status: Never   • Smokeless tobacco: Never   Vaping Use   • Vaping Use: Never used   Substance Use Topics   • Alcohol use: No   • Drug use: No       Past Surgical History:   Procedure Laterality Date   • CARDIAC CATHETERIZATION  05/05/2006   • CARDIOVASCULAR STRESS TEST  05/05/2006   • COLONOSCOPY  03/21/2008   • EXTRACORPOREAL SHOCK WAVE LITHOTRIPSY (ESWL) Right 1/28/2022    Procedure: EXTRACORPOREAL SHOCKWAVE LITHOTRIPSY;  Surgeon: Yusef Willson MD;  Location: [facility];  Service: Urology;  Laterality: Right;   • HYSTERECTOMY     • THYROID SURGERY         Current Outpatient Medications   Medication Instructions   • acetaminophen (TYLENOL) 500 mg, Oral, Every 6 Hours PRN   • aspirin 81 mg, Oral, Daily   • atorvastatin (LIPITOR) 80 mg, Oral, Nightly   • bisacodyl (DULCOLAX) 10 mg, Rectal, Daily PRN   • HYDROcodone-acetaminophen (NORCO)  MG per tablet 1 tablet, Oral, Every 4 Hours PRN   • Januvia 100 MG tablet No dose, route, or frequency recorded.   • lamoTRIgine (LAMICTAL) 100 mg, Oral, 2 Times Daily   • levothyroxine (SYNTHROID, LEVOTHROID) 50 mcg, Oral, Daily   • Linzess 145 MCG capsule capsule No dose, route, or frequency recorded.   • LORazepam (ATIVAN) 0.5 mg, Oral, Every 12 Hours   • megestrol (MEGACE) 40 MG/ML suspension No dose, route, or frequency recorded.   • mirtazapine (REMERON) 7.5 MG tablet No dose, route, or frequency recorded.   • NAMZARIC 28-10 MG capsule sustained-release 24 hr 1 tablet, Oral, Nightly   • nystatin 244327 UNIT/GM powder No dose, route, or frequency recorded.   • potassium chloride (K-DUR,KLOR-CON) 10 MEQ CR tablet 10 mEq, Oral, Daily   • potassium chloride 10 MEQ CR tablet No dose, route, or frequency recorded.   • senna (SENOKOT) 8.6 MG tablet 1 tablet, Oral, 2 Times Daily   • sertraline (ZOLOFT) 100 MG tablet No dose, route, or frequency recorded.   • sertraline (ZOLOFT) 25 MG  tablet No dose, route, or frequency recorded.   • topiramate (TOPAMAX) 25 mg, Oral, 2 Times Daily   • vitamin D (ERGOCALCIFEROL) 50,000 Units, Oral, Weekly, PTA: tuesdays          Assessment & Plan   Diagnoses and all orders for this visit:    1. Inadequate nutrition (Primary)    Place PEG

## 2023-01-05 ENCOUNTER — ANESTHESIA (OUTPATIENT)
Dept: PERIOP | Facility: HOSPITAL | Age: 83
End: 2023-01-05
Payer: MEDICARE

## 2023-01-05 ENCOUNTER — HOSPITAL ENCOUNTER (OUTPATIENT)
Facility: HOSPITAL | Age: 83
Setting detail: HOSPITAL OUTPATIENT SURGERY
Discharge: NURSING, REHAB OR LONG-TERM CARE | End: 2023-01-05
Attending: SURGERY | Admitting: SURGERY
Payer: MEDICARE

## 2023-01-05 ENCOUNTER — ANESTHESIA EVENT (OUTPATIENT)
Dept: PERIOP | Facility: HOSPITAL | Age: 83
End: 2023-01-05
Payer: MEDICARE

## 2023-01-05 VITALS
HEART RATE: 63 BPM | SYSTOLIC BLOOD PRESSURE: 119 MMHG | BODY MASS INDEX: 28.7 KG/M2 | TEMPERATURE: 97.7 F | DIASTOLIC BLOOD PRESSURE: 75 MMHG | OXYGEN SATURATION: 100 % | WEIGHT: 178.6 LBS | RESPIRATION RATE: 20 BRPM | HEIGHT: 66 IN

## 2023-01-05 PROCEDURE — 25010000002 PROPOFOL 10 MG/ML EMULSION: Performed by: NURSE ANESTHETIST, CERTIFIED REGISTERED

## 2023-01-05 PROCEDURE — 43246 EGD PLACE GASTROSTOMY TUBE: CPT | Performed by: SURGERY

## 2023-01-05 PROCEDURE — C1769 GUIDE WIRE: HCPCS | Performed by: SURGERY

## 2023-01-05 RX ORDER — SODIUM CHLORIDE 0.9 % (FLUSH) 0.9 %
10 SYRINGE (ML) INJECTION AS NEEDED
Status: DISCONTINUED | OUTPATIENT
Start: 2023-01-05 | End: 2023-01-05 | Stop reason: HOSPADM

## 2023-01-05 RX ORDER — SODIUM CHLORIDE 0.9 % (FLUSH) 0.9 %
10 SYRINGE (ML) INJECTION EVERY 12 HOURS SCHEDULED
Status: DISCONTINUED | OUTPATIENT
Start: 2023-01-05 | End: 2023-01-05 | Stop reason: HOSPADM

## 2023-01-05 RX ORDER — OXYCODONE HYDROCHLORIDE AND ACETAMINOPHEN 5; 325 MG/1; MG/1
1 TABLET ORAL ONCE AS NEEDED
Status: DISCONTINUED | OUTPATIENT
Start: 2023-01-05 | End: 2023-01-05 | Stop reason: HOSPADM

## 2023-01-05 RX ORDER — PROPOFOL 10 MG/ML
VIAL (ML) INTRAVENOUS CONTINUOUS PRN
Status: DISCONTINUED | OUTPATIENT
Start: 2023-01-05 | End: 2023-01-05 | Stop reason: SURG

## 2023-01-05 RX ORDER — LIDOCAINE HYDROCHLORIDE 20 MG/ML
INJECTION, SOLUTION INTRAVENOUS AS NEEDED
Status: DISCONTINUED | OUTPATIENT
Start: 2023-01-05 | End: 2023-01-05 | Stop reason: SURG

## 2023-01-05 RX ORDER — SODIUM CHLORIDE, SODIUM LACTATE, POTASSIUM CHLORIDE, CALCIUM CHLORIDE 600; 310; 30; 20 MG/100ML; MG/100ML; MG/100ML; MG/100ML
100 INJECTION, SOLUTION INTRAVENOUS ONCE AS NEEDED
Status: DISCONTINUED | OUTPATIENT
Start: 2023-01-05 | End: 2023-01-05 | Stop reason: HOSPADM

## 2023-01-05 RX ORDER — FENTANYL CITRATE 50 UG/ML
50 INJECTION, SOLUTION INTRAMUSCULAR; INTRAVENOUS
Status: DISCONTINUED | OUTPATIENT
Start: 2023-01-05 | End: 2023-01-05 | Stop reason: HOSPADM

## 2023-01-05 RX ORDER — MEPERIDINE HYDROCHLORIDE 25 MG/ML
12.5 INJECTION INTRAMUSCULAR; INTRAVENOUS; SUBCUTANEOUS
Status: DISCONTINUED | OUTPATIENT
Start: 2023-01-05 | End: 2023-01-05 | Stop reason: HOSPADM

## 2023-01-05 RX ORDER — IPRATROPIUM BROMIDE AND ALBUTEROL SULFATE 2.5; .5 MG/3ML; MG/3ML
3 SOLUTION RESPIRATORY (INHALATION) ONCE AS NEEDED
Status: DISCONTINUED | OUTPATIENT
Start: 2023-01-05 | End: 2023-01-05 | Stop reason: HOSPADM

## 2023-01-05 RX ORDER — SODIUM CHLORIDE, SODIUM LACTATE, POTASSIUM CHLORIDE, CALCIUM CHLORIDE 600; 310; 30; 20 MG/100ML; MG/100ML; MG/100ML; MG/100ML
125 INJECTION, SOLUTION INTRAVENOUS ONCE
Status: COMPLETED | OUTPATIENT
Start: 2023-01-05 | End: 2023-01-05

## 2023-01-05 RX ORDER — SODIUM CHLORIDE 9 MG/ML
40 INJECTION, SOLUTION INTRAVENOUS AS NEEDED
Status: DISCONTINUED | OUTPATIENT
Start: 2023-01-05 | End: 2023-01-05 | Stop reason: HOSPADM

## 2023-01-05 RX ORDER — MIDAZOLAM HYDROCHLORIDE 1 MG/ML
0.5 INJECTION INTRAMUSCULAR; INTRAVENOUS
Status: DISCONTINUED | OUTPATIENT
Start: 2023-01-05 | End: 2023-01-05 | Stop reason: HOSPADM

## 2023-01-05 RX ORDER — ONDANSETRON 2 MG/ML
4 INJECTION INTRAMUSCULAR; INTRAVENOUS AS NEEDED
Status: DISCONTINUED | OUTPATIENT
Start: 2023-01-05 | End: 2023-01-05 | Stop reason: HOSPADM

## 2023-01-05 RX ADMIN — PROPOFOL 50 MCG/KG/MIN: 10 INJECTION, EMULSION INTRAVENOUS at 10:44

## 2023-01-05 RX ADMIN — LIDOCAINE HYDROCHLORIDE 60 MG: 20 INJECTION, SOLUTION INTRAVENOUS at 10:44

## 2023-01-05 RX ADMIN — SODIUM CHLORIDE, POTASSIUM CHLORIDE, SODIUM LACTATE AND CALCIUM CHLORIDE: 600; 310; 30; 20 INJECTION, SOLUTION INTRAVENOUS at 10:44

## 2023-01-05 NOTE — OP NOTE
ESOPHAGOGASTRODUODENOSCOPY WITH PERCUTANEOUS ENDOSCOPIC GASTROSTOMY TUBE INSERTION  Procedure Note    Luz Beasley  1/5/2023    Pre-op Diagnosis:   Inadequate nutrition [E63.9]    Post-op Diagnosis: same         Procedure(s):  ESOPHAGOGASTRODUODENOSCOPY WITH PERCUTANEOUS ENDOSCOPIC GASTROSTOMY TUBE INSERTION    Surgeon(s):  Austyn Lr MD    Anesthesia: Anesthesia type not filed in the log.    Staff:   Circulator: Johanna Nuñez RN  Endo Technician: Norberto Holbrook    Estimated Blood Loss: minimal    Specimens:                * No orders in the log *      Drains:   Gastrostomy/Enterostomy Percutaneous endoscopic gastrostomy (PEG) 1 20 Fr. LUQ (Active)   Drain Status Clamped 01/05/23 1048   Dressing Status Clean;Dry;Intact 01/05/23 1048   Dressing Intervention New dressing 01/05/23 1048   Dressing Type Other (Comment) 01/05/23 1048       Procedure: The scope passed easily from the mouth to the duodenum. It was unremarkable. The light was seen in the upper midline. The area was prepped and draped. Lidocaine was injected and a small incision made. The needle was inserted in the stomach and the wire threaded in. It was pulled out the mouth and used to pull back down the PEG tube. This was anchored and a dressing applied.     Findings:           Complications: none   Grafts / Implants N/A    Austyn Lr MD     Date: 1/5/2023  Time: 10:54 EST

## 2023-01-05 NOTE — ANESTHESIA POSTPROCEDURE EVALUATION
Patient: Luz Beasley    Procedure Summary     Date: 01/05/23 Room / Location: [facility] OR 09 /  [facility] OR    Anesthesia Start: 1040 Anesthesia Stop: 1057    Procedure: ESOPHAGOGASTRODUODENOSCOPY WITH PERCUTANEOUS ENDOSCOPIC GASTROSTOMY TUBE INSERTION (Esophagus) Diagnosis:       Inadequate nutrition      (Inadequate nutrition [E63.9])    Surgeons: Austyn Lr MD Provider: Lavell Her MD    Anesthesia Type: general ASA Status: 3          Anesthesia Type: general    Vitals  No vitals data found for the desired time range.          Post Anesthesia Care and Evaluation    Patient location during evaluation: PHASE II  Patient participation: complete - patient participated  Level of consciousness: awake and alert  Pain score: 0  Pain management: adequate    Airway patency: patent  Anesthetic complications: No anesthetic complications    Cardiovascular status: acceptable  Respiratory status: acceptable  Hydration status: acceptable

## 2023-01-05 NOTE — ANESTHESIA PREPROCEDURE EVALUATION
Anesthesia Evaluation     Patient summary reviewed and Nursing notes reviewed   no history of anesthetic complications:  NPO Solid Status: > 8 hours  NPO Liquid Status: > 8 hours           Airway   Mallampati: II  TM distance: >3 FB  Neck ROM: full  No difficulty expected  Dental    (+) edentulous    Pulmonary - negative pulmonary ROS and normal exam   Cardiovascular - normal exam    Rhythm: regular  Rate: normal    (+) hypertension, hyperlipidemia,       Neuro/Psych  (+) headaches, psychiatric history, dementia,    GI/Hepatic/Renal/Endo    (+) morbid obesity,  renal disease, diabetes mellitus, thyroid problem hypothyroidism    Musculoskeletal     Abdominal  - normal exam   Substance History - negative use     OB/GYN negative ob/gyn ROS         Other   arthritis,      ROS/Med Hx Other: Hyperparathroidism                  Anesthesia Plan    ASA 3     general   total IV anesthesia  intravenous induction     Anesthetic plan, risks, benefits, and alternatives have been provided, discussed and informed consent has been obtained with: legal guardian.  Pre-procedure education provided  Plan discussed with CRNA.        CODE STATUS:

## 2023-01-10 ENCOUNTER — HOSPITAL ENCOUNTER (EMERGENCY)
Facility: HOSPITAL | Age: 83
Discharge: NURSING, REHAB OR LONG-TERM CARE | End: 2023-01-10
Attending: EMERGENCY MEDICINE | Admitting: EMERGENCY MEDICINE
Payer: MEDICARE

## 2023-01-10 ENCOUNTER — APPOINTMENT (OUTPATIENT)
Dept: GENERAL RADIOLOGY | Facility: HOSPITAL | Age: 83
End: 2023-01-10
Payer: MEDICARE

## 2023-01-10 VITALS
DIASTOLIC BLOOD PRESSURE: 63 MMHG | RESPIRATION RATE: 20 BRPM | SYSTOLIC BLOOD PRESSURE: 107 MMHG | WEIGHT: 220 LBS | OXYGEN SATURATION: 96 % | BODY MASS INDEX: 33.34 KG/M2 | TEMPERATURE: 98.9 F | HEART RATE: 88 BPM | HEIGHT: 68 IN

## 2023-01-10 DIAGNOSIS — K94.23 MALFUNCTION OF GASTROSTOMY TUBE: Primary | ICD-10-CM

## 2023-01-10 PROCEDURE — 74018 RADEX ABDOMEN 1 VIEW: CPT

## 2023-01-10 PROCEDURE — 0 DIATRIZOATE MEGLUMINE & SODIUM PER 1 ML: Performed by: EMERGENCY MEDICINE

## 2023-01-10 PROCEDURE — 99283 EMERGENCY DEPT VISIT LOW MDM: CPT

## 2023-01-10 RX ADMIN — DIATRIZOATE MEGLUMINE AND DIATRIZOATE SODIUM 30 ML: 600; 100 SOLUTION ORAL; RECTAL at 06:40

## 2023-01-10 NOTE — ED PROVIDER NOTES
Subjective   History of Present Illness  Luz Beasley is an 82-year-old demented nursing home female patient brought to the emergency room after she accidentally pulled out her G-tube.  Nursing home staff replaced the G-tube prior to transfer, patient here for verification of G-tube placement.    History provided by:  Patient   used: No        Review of Systems   Unable to perform ROS: Dementia   All other systems reviewed and are negative.      Past Medical History:   Diagnosis Date   • Anxiety    • Cluster headache    • Dementia (HCC)    • Depression    • Diabetes mellitus (HCC)    • Disease of thyroid gland    • Hx of sepsis    • Hyperlipidemia    • Hyperparathyroidism (HCC)    • Hypertension    • Muscle weakness    • OA (osteoarthritis)    • Obesity    • Senile dementia (HCC)    • UTI (urinary tract infection)    • Vitamin D deficiency    • Vitamin D deficiency disease        No Known Allergies    Past Surgical History:   Procedure Laterality Date   • CARDIAC CATHETERIZATION  05/05/2006   • CARDIOVASCULAR STRESS TEST  05/05/2006   • COLONOSCOPY  03/21/2008   • ENDOSCOPY W/ PEG TUBE PLACEMENT N/A 1/5/2023    Procedure: ESOPHAGOGASTRODUODENOSCOPY WITH PERCUTANEOUS ENDOSCOPIC GASTROSTOMY TUBE INSERTION;  Surgeon: Austyn Lr MD;  Location: [facility];  Service: Gastroenterology;  Laterality: N/A;   • EXTRACORPOREAL SHOCK WAVE LITHOTRIPSY (ESWL) Right 1/28/2022    Procedure: EXTRACORPOREAL SHOCKWAVE LITHOTRIPSY;  Surgeon: Yusef Willson MD;  Location: [facility];  Service: Urology;  Laterality: Right;   • HYSTERECTOMY     • THYROID SURGERY         Family History   Family history unknown: Yes       Social History     Socioeconomic History   • Marital status:    Tobacco Use   • Smoking status: Never   • Smokeless tobacco: Never   Vaping Use   • Vaping Use: Never used   Substance and Sexual Activity   • Alcohol use: No   • Drug use: No   • Sexual activity: Defer           Objective    Physical Exam  Vitals and nursing note reviewed.   Constitutional:       General: She is not in acute distress.     Appearance: Normal appearance. She is well-developed and normal weight. She is not ill-appearing, toxic-appearing or diaphoretic.   HENT:      Head: Normocephalic.      Mouth/Throat:      Mouth: Mucous membranes are moist.   Eyes:      Extraocular Movements: Extraocular movements intact.      Pupils: Pupils are equal, round, and reactive to light.   Cardiovascular:      Rate and Rhythm: Normal rate and regular rhythm.      Heart sounds: Normal heart sounds.   Pulmonary:      Effort: Pulmonary effort is normal.      Breath sounds: Normal breath sounds.   Abdominal:      General: Bowel sounds are normal.      Palpations: Abdomen is soft.   Musculoskeletal:         General: Normal range of motion.      Cervical back: Normal range of motion and neck supple.   Skin:     General: Skin is warm and dry.      Capillary Refill: Capillary refill takes 2 to 3 seconds.      Comments: G-tube inserted in the epigastric area, no drainage, no bleeding.   Neurological:      Mental Status: She is alert. She is disoriented.      GCS: GCS eye subscore is 4. GCS verbal subscore is 5. GCS motor subscore is 6.      Cranial Nerves: No cranial nerve deficit.      Sensory: No sensory deficit.      Motor: No weakness.   Psychiatric:         Mood and Affect: Affect is flat.         Speech: Speech is delayed.         Behavior: Behavior is slowed and withdrawn.         Procedures           ED Course  ED Course as of 01/15/23 0143   Tue Homero 10, 2023   0630 KUB w/ gastrografin:    IMPRESSION:  Gastrostomy tube is probably in good position in the stomach, as the balloon appears to be outlined by air on both sides. [DS]   0645 06:45 -NG tube correctly position, patient to return back to nursing home.  Exam was completed in the setting of the COVID-19 pandemic.  Counseling: Discussed today's findings, in addition to providing specific  details for the plan of care. .     [DS]      ED Course User Index  [DS] Carlitos Granger MD                                           Medical Decision Making  Luz Beasley is an 82-year-old demented nursing home female patient brought to the emergency room after she accidentally pulled out her G-tube.  Nursing home staff replaced the G-tube prior to transfer, patient here for verification of G-tube placement.    KUB w/ gastrografin confirms current position of the G-tube, patient to return to the nursing home..    Malfunction of gastrostomy tube (HCC): acute illness or injury  Amount and/or Complexity of Data Reviewed  Independent Historian: EMS  Radiology: ordered and independent interpretation performed. Decision-making details documented in ED Course.      Risk  Prescription drug management.          Final diagnoses:   Malfunction of gastrostomy tube (HCC)       ED Disposition  ED Disposition     ED Disposition   Discharge    Condition   Stable    Comment   --             Maricarmen De La Vega MD  [address] [place] [id number]  [phone]    Schedule an appointment as soon as possible for a visit   As needed, If symptoms worsen    [facility] Emergency Department  [address] [place] [id number] [phone]    if unable to see PCP         Medication List      No changes were made to your prescriptions during this visit.          Carlitos Granger MD  01/15/23 0143

## 2023-01-10 NOTE — DISCHARGE INSTRUCTIONS
Please resume all previous medications as recommended by nursing home physician, as G-tube has been replaced.    If any new/acute symptoms or worsening of current presentation please go to the closest urgent care or emergency room for prompt reevaluation.

## 2023-01-11 ENCOUNTER — HOSPITAL ENCOUNTER (EMERGENCY)
Facility: HOSPITAL | Age: 83
Discharge: NURSING, REHAB OR LONG-TERM CARE | End: 2023-01-11
Attending: EMERGENCY MEDICINE | Admitting: EMERGENCY MEDICINE
Payer: MEDICARE

## 2023-01-11 ENCOUNTER — APPOINTMENT (OUTPATIENT)
Dept: GENERAL RADIOLOGY | Facility: HOSPITAL | Age: 83
End: 2023-01-11
Payer: MEDICARE

## 2023-01-11 VITALS
DIASTOLIC BLOOD PRESSURE: 77 MMHG | WEIGHT: 220 LBS | OXYGEN SATURATION: 100 % | RESPIRATION RATE: 20 BRPM | SYSTOLIC BLOOD PRESSURE: 146 MMHG | HEART RATE: 64 BPM | TEMPERATURE: 98.4 F | HEIGHT: 68 IN | BODY MASS INDEX: 33.34 KG/M2

## 2023-01-11 DIAGNOSIS — K94.23 MALFUNCTION OF GASTROSTOMY TUBE: Primary | ICD-10-CM

## 2023-01-11 PROCEDURE — 74018 RADEX ABDOMEN 1 VIEW: CPT

## 2023-01-11 PROCEDURE — 0 DIATRIZOATE MEGLUMINE & SODIUM PER 1 ML: Performed by: EMERGENCY MEDICINE

## 2023-01-11 PROCEDURE — 99283 EMERGENCY DEPT VISIT LOW MDM: CPT

## 2023-01-11 RX ADMIN — DIATRIZOATE MEGLUMINE AND DIATRIZOATE SODIUM 30 ML: 600; 100 SOLUTION ORAL; RECTAL at 03:53

## 2023-01-11 NOTE — ED PROVIDER NOTES
Subjective   History of Present Illness  Luz Beasley is a 82-year-old demented NH female patient brought to the emergency room by EMS after she excellently pulled her G-tube.  She was seen in this same department yesterday with similar complaints.  No further history available from nursing home staff.    History provided by:  EMS personnel  History limited by:  Dementia   used: No        Review of Systems   Unable to perform ROS: Dementia   All other systems reviewed and are negative.      Past Medical History:   Diagnosis Date   • Anxiety    • Cluster headache    • Dementia (HCC)    • Depression    • Diabetes mellitus (HCC)    • Disease of thyroid gland    • Hx of sepsis    • Hyperlipidemia    • Hyperparathyroidism (HCC)    • Hypertension    • Muscle weakness    • OA (osteoarthritis)    • Obesity    • Senile dementia (HCC)    • UTI (urinary tract infection)    • Vitamin D deficiency    • Vitamin D deficiency disease        No Known Allergies    Past Surgical History:   Procedure Laterality Date   • CARDIAC CATHETERIZATION  05/05/2006   • CARDIOVASCULAR STRESS TEST  05/05/2006   • COLONOSCOPY  03/21/2008   • ENDOSCOPY W/ PEG TUBE PLACEMENT N/A 1/5/2023    Procedure: ESOPHAGOGASTRODUODENOSCOPY WITH PERCUTANEOUS ENDOSCOPIC GASTROSTOMY TUBE INSERTION;  Surgeon: Austyn Lr MD;  Location: [facility];  Service: Gastroenterology;  Laterality: N/A;   • EXTRACORPOREAL SHOCK WAVE LITHOTRIPSY (ESWL) Right 1/28/2022    Procedure: EXTRACORPOREAL SHOCKWAVE LITHOTRIPSY;  Surgeon: Yusef Willson MD;  Location: [facility];  Service: Urology;  Laterality: Right;   • HYSTERECTOMY     • THYROID SURGERY         Family History   Family history unknown: Yes       Social History     Socioeconomic History   • Marital status:    Tobacco Use   • Smoking status: Never   • Smokeless tobacco: Never   Vaping Use   • Vaping Use: Never used   Substance and Sexual Activity   • Alcohol use: No   • Drug use: No    • Sexual activity: Defer           Objective   Physical Exam  Vitals and nursing note reviewed.   Constitutional:       General: She is not in acute distress.     Appearance: Normal appearance. She is well-developed and normal weight. She is not ill-appearing, toxic-appearing or diaphoretic.   HENT:      Head: Normocephalic.      Mouth/Throat:      Mouth: Mucous membranes are moist.   Eyes:      Extraocular Movements: Extraocular movements intact.      Pupils: Pupils are equal, round, and reactive to light.   Cardiovascular:      Rate and Rhythm: Normal rate and regular rhythm.      Heart sounds: Normal heart sounds.   Pulmonary:      Effort: Pulmonary effort is normal.      Breath sounds: Normal breath sounds.   Abdominal:      General: Bowel sounds are normal.      Palpations: Abdomen is soft.   Musculoskeletal:         General: Normal range of motion.      Cervical back: Normal range of motion and neck supple.   Skin:     General: Skin is warm and dry.      Capillary Refill: Capillary refill takes 2 to 3 seconds.      Comments: Gastrostomy present, without G-tube present.  There is no drainage, there is no tear, there is no leakage observed from the gastrostomy.   Neurological:      Mental Status: She is alert. She is disoriented and confused.      GCS: GCS eye subscore is 3. GCS verbal subscore is 4. GCS motor subscore is 5.      Sensory: No sensory deficit.   Psychiatric:         Mood and Affect: Affect is flat.         Speech: Speech is delayed.         Behavior: Behavior is withdrawn.         Cognition and Memory: She exhibits impaired recent memory.         Judgment: Judgment is inappropriate.         Procedures           ED Course      Procedure note:    G-tube reinserted in the gastro stoma, patient tolerated procedure well.  KUB with Gastrografin confirms correct position of the G-tube.    Dressing applied to G-tube insertion site,    Patient tolerated procedure well, no immediate complications, she  will be discharged back to nursing home.    Exam was completed in the setting of the COVID-19 pandemic.  Counseling: Discussed today's findings, in addition to providing specific details for the plan of care.  Questions are answered and there is agreement with the plan.  Counseling was provided regarding the diagnosis and prognosis. Discussed supportive care, the specific conditions for return, as well as the importance of follow-up. Advised to recheck here immediately with new or worsening symptoms.                                         Medical Decision Making  Luz Beasley is a 82-year-old demented NH female patient brought to the emergency room by EMS after she excellently pulled her G-tube.  She was seen in this same department yesterday with similar complaints.  No further history available from nursing home staff.    G-tube during, KUB with Gastrografin confirms good position, patient to return to nursing home at this time.    Malfunction of gastrostomy tube (HCC): acute illness or injury  Amount and/or Complexity of Data Reviewed  Independent Historian: EMS  Radiology: ordered and independent interpretation performed. Decision-making details documented in ED Course.      Risk  Prescription drug management.          Final diagnoses:   Malfunction of gastrostomy tube (HCC)       ED Disposition  ED Disposition     ED Disposition   Discharge    Condition   Stable    Comment   --             Maricarmen De La Vega MD  [address] [facility] [address]  [phone]    Schedule an appointment as soon as possible for a visit   As needed, If symptoms worsen    [facility] Emergency Department  [address] [place] [id number] [phone]    if unable to see PCP         Medication List      No changes were made to your prescriptions during this visit.          Carlitos Granger MD  01/15/23 0149

## 2023-01-11 NOTE — DISCHARGE INSTRUCTIONS
Please resume all previous medications as recommended by the nursing home physician.    If any new/acute symptoms or worsening of current presentation please return to the emergency room for prompt reevaluation

## 2023-03-21 ENCOUNTER — APPOINTMENT (OUTPATIENT)
Dept: GENERAL RADIOLOGY | Facility: HOSPITAL | Age: 83
End: 2023-03-21
Payer: MEDICARE

## 2023-03-21 ENCOUNTER — ANESTHESIA (OUTPATIENT)
Dept: PERIOP | Facility: HOSPITAL | Age: 83
End: 2023-03-21
Payer: MEDICARE

## 2023-03-21 ENCOUNTER — ANESTHESIA EVENT (OUTPATIENT)
Dept: PERIOP | Facility: HOSPITAL | Age: 83
End: 2023-03-21
Payer: MEDICARE

## 2023-03-21 ENCOUNTER — HOSPITAL ENCOUNTER (OUTPATIENT)
Facility: HOSPITAL | Age: 83
Discharge: NURSING, REHAB OR LONG-TERM CARE | End: 2023-03-22
Attending: STUDENT IN AN ORGANIZED HEALTH CARE EDUCATION/TRAINING PROGRAM | Admitting: SURGERY
Payer: MEDICARE

## 2023-03-21 DIAGNOSIS — K94.23 GASTROSTOMY TUBE DYSFUNCTION: Primary | ICD-10-CM

## 2023-03-21 PROBLEM — N39.0 UTI (URINARY TRACT INFECTION): Status: RESOLVED | Noted: 2017-10-17 | Resolved: 2023-03-21

## 2023-03-21 PROBLEM — A41.9 SEPSIS (HCC): Status: RESOLVED | Noted: 2017-10-18 | Resolved: 2023-03-21

## 2023-03-21 LAB
ALBUMIN SERPL-MCNC: 3.6 G/DL (ref 3.5–5.2)
ALBUMIN/GLOB SERPL: 1 G/DL
ALP SERPL-CCNC: 108 U/L (ref 39–117)
ALT SERPL W P-5'-P-CCNC: 17 U/L (ref 1–33)
ANION GAP SERPL CALCULATED.3IONS-SCNC: 9.5 MMOL/L (ref 5–15)
APTT PPP: 28.8 SECONDS (ref 26.5–34.5)
AST SERPL-CCNC: 21 U/L (ref 1–32)
BASOPHILS # BLD AUTO: 0.04 10*3/MM3 (ref 0–0.2)
BASOPHILS NFR BLD AUTO: 0.5 % (ref 0–1.5)
BILIRUB SERPL-MCNC: 0.5 MG/DL (ref 0–1.2)
BUN SERPL-MCNC: 29 MG/DL (ref 8–23)
BUN/CREAT SERPL: 27.9 (ref 7–25)
CALCIUM SPEC-SCNC: 9.8 MG/DL (ref 8.6–10.5)
CHLORIDE SERPL-SCNC: 105 MMOL/L (ref 98–107)
CO2 SERPL-SCNC: 24.5 MMOL/L (ref 22–29)
CREAT SERPL-MCNC: 1.04 MG/DL (ref 0.57–1)
DEPRECATED RDW RBC AUTO: 56.2 FL (ref 37–54)
EGFRCR SERPLBLD CKD-EPI 2021: 53.8 ML/MIN/1.73
EOSINOPHIL # BLD AUTO: 0.28 10*3/MM3 (ref 0–0.4)
EOSINOPHIL NFR BLD AUTO: 3.5 % (ref 0.3–6.2)
ERYTHROCYTE [DISTWIDTH] IN BLOOD BY AUTOMATED COUNT: 16.8 % (ref 12.3–15.4)
FLUAV RNA RESP QL NAA+PROBE: NOT DETECTED
FLUBV RNA ISLT QL NAA+PROBE: NOT DETECTED
GLOBULIN UR ELPH-MCNC: 3.6 GM/DL
GLUCOSE SERPL-MCNC: 157 MG/DL (ref 65–99)
HCT VFR BLD AUTO: 42.1 % (ref 34–46.6)
HGB BLD-MCNC: 13.3 G/DL (ref 12–15.9)
IMM GRANULOCYTES # BLD AUTO: 0.02 10*3/MM3 (ref 0–0.05)
IMM GRANULOCYTES NFR BLD AUTO: 0.2 % (ref 0–0.5)
INR PPP: 0.96 (ref 0.9–1.1)
LYMPHOCYTES # BLD AUTO: 2.33 10*3/MM3 (ref 0.7–3.1)
LYMPHOCYTES NFR BLD AUTO: 28.9 % (ref 19.6–45.3)
MCH RBC QN AUTO: 28.9 PG (ref 26.6–33)
MCHC RBC AUTO-ENTMCNC: 31.6 G/DL (ref 31.5–35.7)
MCV RBC AUTO: 91.5 FL (ref 79–97)
MONOCYTES # BLD AUTO: 0.46 10*3/MM3 (ref 0.1–0.9)
MONOCYTES NFR BLD AUTO: 5.7 % (ref 5–12)
NEUTROPHILS NFR BLD AUTO: 4.92 10*3/MM3 (ref 1.7–7)
NEUTROPHILS NFR BLD AUTO: 61.2 % (ref 42.7–76)
NRBC BLD AUTO-RTO: 0 /100 WBC (ref 0–0.2)
PLATELET # BLD AUTO: 209 10*3/MM3 (ref 140–450)
PMV BLD AUTO: 10.3 FL (ref 6–12)
POTASSIUM SERPL-SCNC: 4.2 MMOL/L (ref 3.5–5.2)
PROT SERPL-MCNC: 7.2 G/DL (ref 6–8.5)
PROTHROMBIN TIME: 13 SECONDS (ref 12.1–14.7)
RBC # BLD AUTO: 4.6 10*6/MM3 (ref 3.77–5.28)
SARS-COV-2 RNA RESP QL NAA+PROBE: NOT DETECTED
SODIUM SERPL-SCNC: 139 MMOL/L (ref 136–145)
WBC NRBC COR # BLD: 8.05 10*3/MM3 (ref 3.4–10.8)

## 2023-03-21 PROCEDURE — 81001 URINALYSIS AUTO W/SCOPE: CPT | Performed by: SURGERY

## 2023-03-21 PROCEDURE — 63710000001 TOPIRAMATE PER 25 MG: Performed by: INTERNAL MEDICINE

## 2023-03-21 PROCEDURE — 71045 X-RAY EXAM CHEST 1 VIEW: CPT

## 2023-03-21 PROCEDURE — 63710000001 LORAZEPAM 0.5 MG TABLET: Performed by: INTERNAL MEDICINE

## 2023-03-21 PROCEDURE — 63710000001 ASPIRIN 81 MG CHEWABLE TABLET: Performed by: INTERNAL MEDICINE

## 2023-03-21 PROCEDURE — C1769 GUIDE WIRE: HCPCS | Performed by: SURGERY

## 2023-03-21 PROCEDURE — 87086 URINE CULTURE/COLONY COUNT: CPT | Performed by: SURGERY

## 2023-03-21 PROCEDURE — G0378 HOSPITAL OBSERVATION PER HR: HCPCS

## 2023-03-21 PROCEDURE — 99222 1ST HOSP IP/OBS MODERATE 55: CPT | Performed by: SURGERY

## 2023-03-21 PROCEDURE — A9270 NON-COVERED ITEM OR SERVICE: HCPCS | Performed by: INTERNAL MEDICINE

## 2023-03-21 PROCEDURE — 71045 X-RAY EXAM CHEST 1 VIEW: CPT | Performed by: RADIOLOGY

## 2023-03-21 PROCEDURE — 63710000001 LAMOTRIGINE 100 MG TABLET: Performed by: INTERNAL MEDICINE

## 2023-03-21 PROCEDURE — 0 LIDOCAINE 1 % SOLUTION: Performed by: SURGERY

## 2023-03-21 PROCEDURE — 80053 COMPREHEN METABOLIC PANEL: CPT | Performed by: STUDENT IN AN ORGANIZED HEALTH CARE EDUCATION/TRAINING PROGRAM

## 2023-03-21 PROCEDURE — 87636 SARSCOV2 & INF A&B AMP PRB: CPT | Performed by: STUDENT IN AN ORGANIZED HEALTH CARE EDUCATION/TRAINING PROGRAM

## 2023-03-21 PROCEDURE — 85610 PROTHROMBIN TIME: CPT | Performed by: STUDENT IN AN ORGANIZED HEALTH CARE EDUCATION/TRAINING PROGRAM

## 2023-03-21 PROCEDURE — 85730 THROMBOPLASTIN TIME PARTIAL: CPT | Performed by: STUDENT IN AN ORGANIZED HEALTH CARE EDUCATION/TRAINING PROGRAM

## 2023-03-21 PROCEDURE — 25010000002 PROPOFOL 10 MG/ML EMULSION: Performed by: NURSE ANESTHETIST, CERTIFIED REGISTERED

## 2023-03-21 PROCEDURE — C9803 HOPD COVID-19 SPEC COLLECT: HCPCS

## 2023-03-21 PROCEDURE — 96374 THER/PROPH/DIAG INJ IV PUSH: CPT

## 2023-03-21 PROCEDURE — 63710000001 ACETAMINOPHEN 500 MG TABLET: Performed by: INTERNAL MEDICINE

## 2023-03-21 PROCEDURE — 63710000001 ATORVASTATIN 40 MG TABLET: Performed by: INTERNAL MEDICINE

## 2023-03-21 PROCEDURE — 63710000001 SERTRALINE 50 MG TABLET: Performed by: INTERNAL MEDICINE

## 2023-03-21 PROCEDURE — 63710000001 MEGESTROL 40 MG/ML SUSPENSION: Performed by: INTERNAL MEDICINE

## 2023-03-21 PROCEDURE — 36415 COLL VENOUS BLD VENIPUNCTURE: CPT

## 2023-03-21 PROCEDURE — 25010000002 CEFAZOLIN PER 500 MG: Performed by: NURSE ANESTHETIST, CERTIFIED REGISTERED

## 2023-03-21 PROCEDURE — 43246 EGD PLACE GASTROSTOMY TUBE: CPT | Performed by: SURGERY

## 2023-03-21 PROCEDURE — 85025 COMPLETE CBC W/AUTO DIFF WBC: CPT | Performed by: STUDENT IN AN ORGANIZED HEALTH CARE EDUCATION/TRAINING PROGRAM

## 2023-03-21 PROCEDURE — 63710000001 SERTRALINE 25 MG TABLET: Performed by: INTERNAL MEDICINE

## 2023-03-21 PROCEDURE — 87077 CULTURE AEROBIC IDENTIFY: CPT | Performed by: SURGERY

## 2023-03-21 PROCEDURE — 25010000002 DIPHENHYDRAMINE PER 50 MG

## 2023-03-21 PROCEDURE — 99284 EMERGENCY DEPT VISIT MOD MDM: CPT

## 2023-03-21 PROCEDURE — 99204 OFFICE O/P NEW MOD 45 MIN: CPT

## 2023-03-21 PROCEDURE — 87186 SC STD MICRODIL/AGAR DIL: CPT | Performed by: SURGERY

## 2023-03-21 PROCEDURE — 63710000001 LINAGLIPTIN 5 MG TABLET: Performed by: INTERNAL MEDICINE

## 2023-03-21 RX ORDER — MEGESTROL ACETATE 40 MG/ML
400 SUSPENSION ORAL DAILY
Status: DISCONTINUED | OUTPATIENT
Start: 2023-03-21 | End: 2023-03-22 | Stop reason: HOSPADM

## 2023-03-21 RX ORDER — POTASSIUM CHLORIDE 20 MEQ/1
10 TABLET, EXTENDED RELEASE ORAL DAILY
Status: CANCELLED | OUTPATIENT
Start: 2023-03-21

## 2023-03-21 RX ORDER — LAMOTRIGINE 25 MG/1
75 TABLET ORAL EVERY EVENING
COMMUNITY

## 2023-03-21 RX ORDER — ONDANSETRON 2 MG/ML
4 INJECTION INTRAMUSCULAR; INTRAVENOUS AS NEEDED
Status: DISCONTINUED | OUTPATIENT
Start: 2023-03-21 | End: 2023-03-21 | Stop reason: HOSPADM

## 2023-03-21 RX ORDER — PROPOFOL 10 MG/ML
VIAL (ML) INTRAVENOUS AS NEEDED
Status: DISCONTINUED | OUTPATIENT
Start: 2023-03-21 | End: 2023-03-21 | Stop reason: SURG

## 2023-03-21 RX ORDER — LAMOTRIGINE 100 MG/1
100 TABLET ORAL EVERY MORNING
Status: DISCONTINUED | OUTPATIENT
Start: 2023-03-22 | End: 2023-03-22 | Stop reason: HOSPADM

## 2023-03-21 RX ORDER — LAMOTRIGINE 100 MG/1
100 TABLET ORAL EVERY MORNING
Status: CANCELLED | OUTPATIENT
Start: 2023-03-21

## 2023-03-21 RX ORDER — DONEPEZIL HYDROCHLORIDE 5 MG/1
10 TABLET, FILM COATED ORAL NIGHTLY
Status: CANCELLED | OUTPATIENT
Start: 2023-03-21

## 2023-03-21 RX ORDER — IPRATROPIUM BROMIDE AND ALBUTEROL SULFATE 2.5; .5 MG/3ML; MG/3ML
3 SOLUTION RESPIRATORY (INHALATION) ONCE AS NEEDED
Status: DISCONTINUED | OUTPATIENT
Start: 2023-03-21 | End: 2023-03-21 | Stop reason: HOSPADM

## 2023-03-21 RX ORDER — CEFAZOLIN SODIUM 1 G/3ML
INJECTION, POWDER, FOR SOLUTION INTRAMUSCULAR; INTRAVENOUS AS NEEDED
Status: DISCONTINUED | OUTPATIENT
Start: 2023-03-21 | End: 2023-03-21 | Stop reason: SURG

## 2023-03-21 RX ORDER — SODIUM CHLORIDE, SODIUM LACTATE, POTASSIUM CHLORIDE, CALCIUM CHLORIDE 600; 310; 30; 20 MG/100ML; MG/100ML; MG/100ML; MG/100ML
100 INJECTION, SOLUTION INTRAVENOUS ONCE AS NEEDED
Status: DISCONTINUED | OUTPATIENT
Start: 2023-03-21 | End: 2023-03-21 | Stop reason: HOSPADM

## 2023-03-21 RX ORDER — MIDAZOLAM HYDROCHLORIDE 1 MG/ML
0.5 INJECTION INTRAMUSCULAR; INTRAVENOUS
Status: DISCONTINUED | OUTPATIENT
Start: 2023-03-21 | End: 2023-03-21 | Stop reason: HOSPADM

## 2023-03-21 RX ORDER — LEVOTHYROXINE SODIUM 0.05 MG/1
50 TABLET ORAL
Status: DISCONTINUED | OUTPATIENT
Start: 2023-03-22 | End: 2023-03-22 | Stop reason: HOSPADM

## 2023-03-21 RX ORDER — LORAZEPAM 0.5 MG/1
0.5 TABLET ORAL EVERY 12 HOURS
Status: DISCONTINUED | OUTPATIENT
Start: 2023-03-21 | End: 2023-03-22 | Stop reason: HOSPADM

## 2023-03-21 RX ORDER — SODIUM CHLORIDE 0.9 % (FLUSH) 0.9 %
10 SYRINGE (ML) INJECTION EVERY 12 HOURS SCHEDULED
Status: DISCONTINUED | OUTPATIENT
Start: 2023-03-21 | End: 2023-03-21 | Stop reason: HOSPADM

## 2023-03-21 RX ORDER — ATORVASTATIN CALCIUM 40 MG/1
80 TABLET, FILM COATED ORAL NIGHTLY
Status: CANCELLED | OUTPATIENT
Start: 2023-03-21

## 2023-03-21 RX ORDER — DIPHENHYDRAMINE HYDROCHLORIDE 50 MG/ML
25 INJECTION INTRAMUSCULAR; INTRAVENOUS ONCE
Status: COMPLETED | OUTPATIENT
Start: 2023-03-21 | End: 2023-03-21

## 2023-03-21 RX ORDER — SODIUM CHLORIDE 0.9 % (FLUSH) 0.9 %
10 SYRINGE (ML) INJECTION AS NEEDED
Status: DISCONTINUED | OUTPATIENT
Start: 2023-03-21 | End: 2023-03-21 | Stop reason: HOSPADM

## 2023-03-21 RX ORDER — LAMOTRIGINE 100 MG/1
75 TABLET ORAL EVERY EVENING
Status: CANCELLED | OUTPATIENT
Start: 2023-03-21

## 2023-03-21 RX ORDER — BISACODYL 10 MG
10 SUPPOSITORY, RECTAL RECTAL DAILY PRN
Status: DISCONTINUED | OUTPATIENT
Start: 2023-03-21 | End: 2023-03-22 | Stop reason: HOSPADM

## 2023-03-21 RX ORDER — MEGESTROL ACETATE 40 MG/ML
400 SUSPENSION ORAL DAILY
Status: CANCELLED | OUTPATIENT
Start: 2023-03-21

## 2023-03-21 RX ORDER — SODIUM CHLORIDE 9 MG/ML
40 INJECTION, SOLUTION INTRAVENOUS AS NEEDED
Status: DISCONTINUED | OUTPATIENT
Start: 2023-03-21 | End: 2023-03-21 | Stop reason: HOSPADM

## 2023-03-21 RX ORDER — TOPIRAMATE 25 MG/1
25 TABLET ORAL 2 TIMES DAILY
Status: DISCONTINUED | OUTPATIENT
Start: 2023-03-21 | End: 2023-03-22 | Stop reason: HOSPADM

## 2023-03-21 RX ORDER — LAMOTRIGINE 100 MG/1
75 TABLET ORAL EVERY EVENING
Status: DISCONTINUED | OUTPATIENT
Start: 2023-03-21 | End: 2023-03-22 | Stop reason: HOSPADM

## 2023-03-21 RX ORDER — TOPIRAMATE 25 MG/1
25 TABLET ORAL 2 TIMES DAILY
Status: CANCELLED | OUTPATIENT
Start: 2023-03-21

## 2023-03-21 RX ORDER — OXYCODONE HYDROCHLORIDE AND ACETAMINOPHEN 5; 325 MG/1; MG/1
1 TABLET ORAL ONCE AS NEEDED
Status: DISCONTINUED | OUTPATIENT
Start: 2023-03-21 | End: 2023-03-21 | Stop reason: HOSPADM

## 2023-03-21 RX ORDER — SODIUM CHLORIDE, SODIUM LACTATE, POTASSIUM CHLORIDE, CALCIUM CHLORIDE 600; 310; 30; 20 MG/100ML; MG/100ML; MG/100ML; MG/100ML
125 INJECTION, SOLUTION INTRAVENOUS ONCE
Status: DISCONTINUED | OUTPATIENT
Start: 2023-03-21 | End: 2023-03-21 | Stop reason: HOSPADM

## 2023-03-21 RX ORDER — ASPIRIN 81 MG/1
81 TABLET, CHEWABLE ORAL DAILY
Status: DISCONTINUED | OUTPATIENT
Start: 2023-03-21 | End: 2023-03-22 | Stop reason: HOSPADM

## 2023-03-21 RX ORDER — FENTANYL CITRATE 50 UG/ML
50 INJECTION, SOLUTION INTRAMUSCULAR; INTRAVENOUS
Status: DISCONTINUED | OUTPATIENT
Start: 2023-03-21 | End: 2023-03-21 | Stop reason: HOSPADM

## 2023-03-21 RX ORDER — ATORVASTATIN CALCIUM 40 MG/1
80 TABLET, FILM COATED ORAL NIGHTLY
Status: DISCONTINUED | OUTPATIENT
Start: 2023-03-21 | End: 2023-03-22 | Stop reason: HOSPADM

## 2023-03-21 RX ORDER — DIPHENHYDRAMINE HYDROCHLORIDE 50 MG/ML
INJECTION INTRAMUSCULAR; INTRAVENOUS
Status: COMPLETED
Start: 2023-03-21 | End: 2023-03-21

## 2023-03-21 RX ORDER — LIDOCAINE HYDROCHLORIDE 10 MG/ML
INJECTION, SOLUTION INFILTRATION; PERINEURAL AS NEEDED
Status: DISCONTINUED | OUTPATIENT
Start: 2023-03-21 | End: 2023-03-21 | Stop reason: HOSPADM

## 2023-03-21 RX ORDER — SENNA PLUS 8.6 MG/1
1 TABLET ORAL DAILY PRN
Status: CANCELLED | OUTPATIENT
Start: 2023-03-21

## 2023-03-21 RX ORDER — LEVOTHYROXINE SODIUM 0.05 MG/1
50 TABLET ORAL
Status: CANCELLED | OUTPATIENT
Start: 2023-03-22

## 2023-03-21 RX ORDER — SENNA PLUS 8.6 MG/1
1 TABLET ORAL DAILY PRN
COMMUNITY

## 2023-03-21 RX ORDER — ASPIRIN 81 MG/1
81 TABLET, CHEWABLE ORAL DAILY
Status: CANCELLED | OUTPATIENT
Start: 2023-03-21

## 2023-03-21 RX ORDER — ACETAMINOPHEN 500 MG
500 TABLET ORAL EVERY 6 HOURS PRN
Status: CANCELLED | OUTPATIENT
Start: 2023-03-21

## 2023-03-21 RX ORDER — ACETAMINOPHEN 500 MG
500 TABLET ORAL EVERY 6 HOURS PRN
Status: DISCONTINUED | OUTPATIENT
Start: 2023-03-21 | End: 2023-03-22 | Stop reason: HOSPADM

## 2023-03-21 RX ORDER — MEMANTINE HYDROCHLORIDE 10 MG/1
10 TABLET ORAL EVERY 12 HOURS SCHEDULED
Status: CANCELLED | OUTPATIENT
Start: 2023-03-21

## 2023-03-21 RX ORDER — LORAZEPAM 0.5 MG/1
0.5 TABLET ORAL EVERY 12 HOURS
Status: CANCELLED | OUTPATIENT
Start: 2023-03-21

## 2023-03-21 RX ADMIN — ASPIRIN 81 MG: 81 TABLET, CHEWABLE ORAL at 22:16

## 2023-03-21 RX ADMIN — TOPIRAMATE 25 MG: 25 TABLET, FILM COATED ORAL at 22:23

## 2023-03-21 RX ADMIN — CEFAZOLIN 2 G: 330 INJECTION, POWDER, FOR SOLUTION INTRAMUSCULAR; INTRAVENOUS at 16:44

## 2023-03-21 RX ADMIN — MEGESTROL ACETATE 400 MG: 40 SUSPENSION ORAL at 22:22

## 2023-03-21 RX ADMIN — PROPOFOL 30 MG: 10 INJECTION, EMULSION INTRAVENOUS at 16:47

## 2023-03-21 RX ADMIN — ATORVASTATIN CALCIUM 80 MG: 40 TABLET, FILM COATED ORAL at 22:16

## 2023-03-21 RX ADMIN — LAMOTRIGINE 75 MG: 100 TABLET ORAL at 22:16

## 2023-03-21 RX ADMIN — DIPHENHYDRAMINE HYDROCHLORIDE 25 MG: 50 INJECTION INTRAMUSCULAR; INTRAVENOUS at 10:15

## 2023-03-21 RX ADMIN — ACETAMINOPHEN 500 MG: 500 TABLET ORAL at 23:26

## 2023-03-21 RX ADMIN — LORAZEPAM 0.5 MG: 0.5 TABLET ORAL at 22:22

## 2023-03-21 RX ADMIN — DIPHENHYDRAMINE HYDROCHLORIDE 25 MG: 50 INJECTION, SOLUTION INTRAMUSCULAR; INTRAVENOUS at 10:15

## 2023-03-21 RX ADMIN — LINAGLIPTIN 5 MG: 5 TABLET, FILM COATED ORAL at 22:16

## 2023-03-21 RX ADMIN — PROPOFOL 40 MG: 10 INJECTION, EMULSION INTRAVENOUS at 16:44

## 2023-03-21 RX ADMIN — SERTRALINE 75 MG: 50 TABLET, FILM COATED ORAL at 22:23

## 2023-03-21 NOTE — CONSULTS
Palliative Care Initial Consult     Attending Physician: Senia Brothers MD  Referring Provider: Senia Brothers MD    Palliative care reason for consult: GOC/ACP  Code Status:   Code Status and Medical Interventions:   Ordered at: 03/21/23 1308     Level Of Support Discussed With:    Health Care Surrogate     Code Status (Patient has no pulse and is not breathing):    CPR (Attempt to Resuscitate)     Medical Interventions (Patient has pulse or is breathing):    Full Support     Release to patient:    Routine Release      Advanced Directives: Advance Directive Status:  ([place])   Healthcare surrogate: Guardian Sophia Craft  Goals of Care:     HPI:  uLz Beasley is a 82 y.o. female admitted on 3/21/2023 due to her pulling her PEG tube out, Luz is from a local nursing home. She had her PEG tube placed in January of 23 by Dr Lr. She has a history of anxiety, dementia, depression, DM, thyroid disorder, hyperlipidemia, hyperparathyroidism, hypertension, osteoarthritis. Palliative was consulted to discuss GOC/ACP        ROS: Negative except as above in HPI.     Past Medical History:   Diagnosis Date   • Anxiety    • Cluster headache    • Dementia (HCC)    • Depression    • Diabetes mellitus (HCC)    • Disease of thyroid gland    • Hx of sepsis    • Hyperlipidemia    • Hyperparathyroidism (HCC)    • Hypertension    • Muscle weakness    • OA (osteoarthritis)    • Obesity    • Senile dementia (HCC)    • UTI (urinary tract infection)    • Vitamin D deficiency    • Vitamin D deficiency disease      Past Surgical History:   Procedure Laterality Date   • CARDIAC CATHETERIZATION  05/05/2006   • CARDIOVASCULAR STRESS TEST  05/05/2006   • COLONOSCOPY  03/21/2008   • ENDOSCOPY W/ PEG TUBE PLACEMENT N/A 1/5/2023    Procedure: ESOPHAGOGASTRODUODENOSCOPY WITH PERCUTANEOUS ENDOSCOPIC GASTROSTOMY TUBE INSERTION;  Surgeon: Austyn Lr MD;  Location: [facility];  Service: Gastroenterology;  Laterality: N/A;   • EXTRACORPOREAL  "SHOCK WAVE LITHOTRIPSY (ESWL) Right 1/28/2022    Procedure: EXTRACORPOREAL SHOCKWAVE LITHOTRIPSY;  Surgeon: Yusef Willson MD;  Location: [facility];  Service: Urology;  Laterality: Right;   • HYSTERECTOMY     • THYROID SURGERY       Social History     Socioeconomic History   • Marital status:    Tobacco Use   • Smoking status: Never   • Smokeless tobacco: Never   Vaping Use   • Vaping Use: Never used   Substance and Sexual Activity   • Alcohol use: No   • Drug use: No   • Sexual activity: Defer     Family History   Family history unknown: Yes       No Known Allergies    Current Facility-Administered Medications   Medication Dose Route Frequency Provider Last Rate Last Admin   • bisacodyl (DULCOLAX) suppository 10 mg  10 mg Rectal Daily PRN Senia Brothers MD            •  bisacodyl    Current medication reviewed for route, type, dose and frequency and are current per MAR.    Palliative Performance Scale Score:     /60 (BP Location: Right arm, Patient Position: Lying)   Pulse 66   Temp 98.5 °F (36.9 °C) (Axillary)   Resp 18   Ht 172.7 cm (67.99\")   Wt 99.8 kg (220 lb 0.3 oz)   LMP  (LMP Unknown)   SpO2 98%   BMI 33.46 kg/m²   No intake or output data in the 24 hours ending 03/21/23 1509    PE:  General Appearance:    Chronically ill appearing, alert not oriented, cooperative, NAD   HEENT:    NC/AT, without obvious abnormality, EOMI, anicteric    Neck:   supple, trachea midline, no JVD   Lungs:     Regular unlabored respiration no wheezing, rhonchi or rales noted.    Heart:    RRR, normal S1 and S2, no M/R/G   Abdomen:     Soft, NT, ND, NABS    Extremities:   Moves all extremities, no edema   Pulses:   Pulses palpable and equal bilaterally   Skin:   Warm, dry, pale   Neurologic:   Alert to self not time or place   Psych:   Calm, confused       Labs:   Results from last 7 days   Lab Units 03/21/23  0159   WBC 10*3/mm3 8.05   HEMOGLOBIN g/dL 13.3   HEMATOCRIT % 42.1   PLATELETS 10*3/mm3 " 209     Results from last 7 days   Lab Units 03/21/23  0159   SODIUM mmol/L 139   POTASSIUM mmol/L 4.2   CHLORIDE mmol/L 105   CO2 mmol/L 24.5   BUN mg/dL 29*   CREATININE mg/dL 1.04*   GLUCOSE mg/dL 157*   CALCIUM mg/dL 9.8     Results from last 7 days   Lab Units 03/21/23  0159   SODIUM mmol/L 139   POTASSIUM mmol/L 4.2   CHLORIDE mmol/L 105   CO2 mmol/L 24.5   BUN mg/dL 29*   CREATININE mg/dL 1.04*   CALCIUM mg/dL 9.8   BILIRUBIN mg/dL 0.5   ALK PHOS U/L 108   ALT (SGPT) U/L 17   AST (SGOT) U/L 21   GLUCOSE mg/dL 157*     Imaging Results (Last 72 Hours)     Procedure Component Value Units Date/Time    XR Chest 1 View [[id number]] Collected: 03/21/23 1428     Updated: 03/21/23 1431    Narrative:      EXAM:    XR Chest, 1 View     EXAM DATE:    3/21/2023 1:34 PM     CLINICAL HISTORY:    screening; K94.23-Gastrostomy malfunction     TECHNIQUE:    Frontal view of the chest.     COMPARISON:    03/23/2020     FINDINGS:    LUNGS:  Bronchial wall thickening suggestive of chronic bronchitis  noted.  Increased rounded opacity of the right inferior hilum that  probably represents vascular, however CT scan of the chest is  recommended for further evaluation.  Coarsened interstitial markings  noted throughout the lungs.    PLEURAL SPACE:  Unremarkable.  No pneumothorax.    HEART:  Unremarkable.  No cardiomegaly.    MEDIASTINUM:  Unremarkable.    BONES/JOINTS:  Unremarkable.       Impression:      1.  Bronchial wall thickening suggestive of chronic bronchitis noted.  2.  Increased rounded opacity of the right inferior hilum that probably  represents vascular, however CT scan of the chest is recommended for  further evaluation.     This report was finalized on 3/21/2023 2:29 PM by Dr. Juan Benavidez MD.             Diagnostics: Reviewed    A: Luz Beasley is a 82 y.o. female admitted on 3/21/2023 due to her pulling her PEG tube out, Luz is from a local nursing home. She had her PEG tube placed in January of 23 by Dr Lr. She  has a history of anxiety, dementia, depression, DM, thyroid disorder, hyperlipidemia, hyperparathyroidism, hypertension, osteoarthritis. Palliative was consulted to discuss GOC/ACP           P:   Palliative was consulted to discuss GOC/ACP. I was able to discuss with pts guardian Neftali regarding her goals of care for Luz. We discussed her feeding tube and that she has pulled it out multiple times, Neftali had already spoken with Dr Jarvis and pt was in OR for peg placement. We discussed that moving forward if she continues to pull tube out that maybe she should focus on her comfort, she agreed. We also discussed her code status and after discussion neftali decided to make Luz a DNR/DNI at this time. I let CHRISTINE Brush and Dr Brothers know of the code status change.        We appreciate the consult and the opportunity to participate in Luz Beasley's care. We will continue to follow along. Please do not hesitate to contact us regarding further symptom management or goals of care needs, including after hours or on weekends via our on call provider at [phone].     Time: 55 minutes spent reviewing medical and medication records, assessing and examining patient, discussing with family, answering questions, providing some guidance about a plan and documentation of care, and coordinating care with other healthcare members, with > 50% time spent face to face.     Arianna Peace, APRN    3/21/2023

## 2023-03-21 NOTE — PLAN OF CARE
Goal Outcome Evaluation:  Plan of Care Reviewed With: patient        Progress: no change  Outcome Evaluation: pt arrived on unit from ER to room 50B, pt alert but confused, pt currently off floor for PEG tube replacement.

## 2023-03-21 NOTE — ED PROVIDER NOTES
Subjective   History of Present Illness     Luz is an 81 yo G tube dependence 2/2 to dementia and poor po intake, presenting to the ED after pulling out G tube. Patient had placed 1/2023 by Dr. Be scott.  Nursing home reports they found G tube this morning, unclear how long G tube had been out. Patient does not appear to be in any discomfort and denies any abdominal distension, or abdominal pain when palpated. Patient has not had any emesis. No fevers or other infectious symptoms. Normal bowel movements.     Review of Systems   Constitutional: Negative.  Negative for activity change and fever.   HENT: Negative.  Negative for congestion.    Respiratory: Negative.  Negative for cough and shortness of breath.    Cardiovascular: Negative.  Negative for chest pain.   Gastrointestinal: Negative.  Negative for abdominal pain.   Endocrine: Negative.    Genitourinary: Negative.  Negative for dysuria.   Skin: Negative.    Neurological: Negative.  Negative for dizziness, weakness and light-headedness.   Psychiatric/Behavioral: Negative.    All other systems reviewed and are negative.      Past Medical History:   Diagnosis Date   • Anxiety    • Cluster headache    • Dementia (HCC)    • Depression    • Diabetes mellitus (HCC)    • Disease of thyroid gland    • Hx of sepsis    • Hyperlipidemia    • Hyperparathyroidism (HCC)    • Hypertension    • Muscle weakness    • OA (osteoarthritis)    • Obesity    • Senile dementia (HCC)    • UTI (urinary tract infection)    • Vitamin D deficiency    • Vitamin D deficiency disease        No Known Allergies    Past Surgical History:   Procedure Laterality Date   • CARDIAC CATHETERIZATION  05/05/2006   • CARDIOVASCULAR STRESS TEST  05/05/2006   • COLONOSCOPY  03/21/2008   • ENDOSCOPY W/ PEG TUBE PLACEMENT N/A 1/5/2023    Procedure: ESOPHAGOGASTRODUODENOSCOPY WITH PERCUTANEOUS ENDOSCOPIC GASTROSTOMY TUBE INSERTION;  Surgeon: Austyn Lr MD;  Location: [facility];  Service:  Gastroenterology;  Laterality: N/A;   • EXTRACORPOREAL SHOCK WAVE LITHOTRIPSY (ESWL) Right 1/28/2022    Procedure: EXTRACORPOREAL SHOCKWAVE LITHOTRIPSY;  Surgeon: uYsef Willson MD;  Location: [facility];  Service: Urology;  Laterality: Right;   • HYSTERECTOMY     • THYROID SURGERY         Family History   Family history unknown: Yes       Social History     Socioeconomic History   • Marital status:    Tobacco Use   • Smoking status: Never   • Smokeless tobacco: Never   Vaping Use   • Vaping Use: Never used   Substance and Sexual Activity   • Alcohol use: No   • Drug use: No   • Sexual activity: Defer           Objective   Physical Exam  Constitutional:       Appearance: Normal appearance.   HENT:      Head: Normocephalic and atraumatic.      Right Ear: Tympanic membrane normal.      Left Ear: Tympanic membrane normal.      Nose: Nose normal. No congestion or rhinorrhea.      Mouth/Throat:      Mouth: Mucous membranes are moist.      Pharynx: No oropharyngeal exudate or posterior oropharyngeal erythema.   Eyes:      Extraocular Movements: Extraocular movements intact.      Pupils: Pupils are equal, round, and reactive to light.   Cardiovascular:      Rate and Rhythm: Normal rate and regular rhythm.   Pulmonary:      Effort: Pulmonary effort is normal. No respiratory distress.      Breath sounds: Normal breath sounds.   Abdominal:      General: Abdomen is flat. Bowel sounds are normal. There is no distension.      Palpations: There is no mass.      Tenderness: There is no abdominal tenderness. There is no guarding or rebound.      Comments: G tube hole presents   Musculoskeletal:         General: No swelling. Normal range of motion.      Cervical back: Normal range of motion. No rigidity or tenderness.   Skin:     General: Skin is warm.      Capillary Refill: Capillary refill takes less than 2 seconds.      Coloration: Skin is not jaundiced or pale.   Neurological:      General: No focal deficit  present.      Mental Status: She is alert and oriented to person, place, and time.         Procedures           ED Course  ED Course as of 03/21/23 1915   Tue Mar 21, 2023   0145 Patient has no abdominal pain on exam initially to palpation. Abdomen non peritonitic denies any pain. Attempted to replace the G tube, unsuccessful. Unclear how long tube has been out. Called Dr. Lr for replacement. Reports this is non emergent, he will evaluate tomorrow. Patient will be admitted once beds are available to discuss definitive access.  [LS]   1141 Inpatient bed became available.  Likely G-tube replacement.  Recommend admission for further work up and treatment.  Hospitalist team consulted and made aware of the patient.  Consults and orders placed per hospitalist request.  Patient was agreeable to admission plan.  Vitals stable on admission. [SF]      ED Course User Index  [LS] Cat Boogie MD  [SF] Anthony Villagran, DO                                           Medical Decision Making  Attempted to replace G tube, unsuccessful, concern G tube may have been out for a long period as tunnel is already closed. Dr. Lr is called who reports non emergent issue will be consulted in morning. Patient to be admitted when beds available. Pre-Op labs ordered. No imaging warranted at this time as patient appears comfortable and is denying any abdominal pain.     Amount and/or Complexity of Data Reviewed  Labs: ordered.          Final diagnoses:   Gastrostomy tube dysfunction (HCC)       ED Disposition  ED Disposition     ED Disposition   Decision to Admit    Condition   --    Comment   Level of Care: Med/Surg [1]   Diagnosis: Gastrostomy tube dysfunction (HCC) [867613]               No follow-up provider specified.       Medication List      No changes were made to your prescriptions during this visit.          Cat Boogie MD  03/21/23 0632       Cat Boogie MD  03/21/23 0633  Electronically signed by  Cat Boogie MD, 03/21/23, 7:15 PM EDT.     Cat Boogie MD  03/21/23 1103

## 2023-03-21 NOTE — CONSULTS
Patient Name:  Luz Beasley  YOB: 1940  [id number]       Patient Care Team:  Maricarmen De La Vega MD as PCP - General (Geriatric Medicine)      General Surgery Consult Note     Date of Consultation: 03/21/23    Consulting Physician - Senia Brothers MD    Reason for Consult - Displaced G tube    Subjective   Patient is minimally communicative, HPI obtained from EMR  I have been asked to see  Luz Beasley , a 82 y.o. demented female nursing home patient in consultation for displaced G-tube. Patient had an EGD with PEG tube January 2023 for dysphagia and inadequate nutrition.  Per EMR, nursing home found the G-tube displaced.  Unknown time it was pulled out.  ER attempted replacement overnight and general surgery was contacted, noted nonemergent issue.  I was then consulted this afternoon for replacement.    Upon discussion with the guardian, the PEG tube was placed due to inadequate p.o. intake.  Apparently the patient has been refusing to eat at the nursing home and combative.  The patient has pulled out her PEG tube several times though this is the only time it was not able to be replaced at bedside.  No blood thinners in the EMR.  Patient is obviously been n.p.o.    Allergy: No Known Allergies    Medications:        No current facility-administered medications on file prior to encounter.     Current Outpatient Medications on File Prior to Encounter   Medication Sig   • aspirin 81 MG chewable tablet Chew 1 tablet Daily.   • atorvastatin (LIPITOR) 80 MG tablet Take 1 tablet by mouth Every Night.   • Januvia 100 MG tablet Take 1 tablet by mouth Daily.   • lamoTRIgine (LaMICtal) 100 MG tablet Take 1 tablet by mouth Every Morning.   • lamoTRIgine (LaMICtal) 25 MG tablet Take 3 tablets by mouth Every Evening.   • levothyroxine (SYNTHROID, LEVOTHROID) 50 MCG tablet Take 1 tablet by mouth Every Morning.   • LORazepam (ATIVAN) 0.5 MG tablet Take 1 tablet by mouth Every 12 (Twelve) Hours.   • megestrol  (MEGACE) 40 MG/ML suspension Take 10 mL by mouth Daily.   • NAMZARIC 28-10 MG capsule sustained-release 24 hr Take 28 mg by mouth Every Night.   • potassium chloride (K-DUR,KLOR-CON) 10 MEQ CR tablet Take 1 tablet by mouth Daily.   • sertraline (ZOLOFT) 25 MG tablet Take 3 tablets by mouth Every Night.   • topiramate (TOPAMAX) 25 MG tablet Take 1 tablet by mouth 2 (Two) Times a Day.   • acetaminophen (TYLENOL) 500 MG tablet Take 1 tablet by mouth Every 6 (Six) Hours As Needed for Mild Pain or Fever.   • bisacodyl (DULCOLAX) 10 MG suppository Insert 1 suppository into the rectum Daily As Needed for Constipation.   • Linzess 145 MCG capsule capsule Take 1 capsule by mouth Daily As Needed (constipation).   • senna 8.6 MG tablet Take 1 tablet by mouth Daily As Needed for Constipation.   • vitamin D (ERGOCALCIFEROL) 1.25 MG (62377 UT) capsule capsule Take 1 capsule by mouth 1 (One) Time Per Week.   • [DISCONTINUED] mirtazapine (REMERON) 7.5 MG tablet    • [DISCONTINUED] senna (SENOKOT) 8.6 MG tablet Take 1 tablet by mouth 2 (Two) Times a Day.   • [DISCONTINUED] sertraline (ZOLOFT) 100 MG tablet        PMHx:   Past Medical History:   Diagnosis Date   • Anxiety    • Cluster headache    • Dementia (HCC)    • Depression    • Diabetes mellitus (HCC)    • Disease of thyroid gland    • Hx of sepsis    • Hyperlipidemia    • Hyperparathyroidism (HCC)    • Hypertension    • Muscle weakness    • OA (osteoarthritis)    • Obesity    • Senile dementia (HCC)    • UTI (urinary tract infection)    • Vitamin D deficiency    • Vitamin D deficiency disease          Past Surgical History:  EGD with PEG tube    Family History: Unable to obtain due to patient dementia    Social History:   Nursing home patient  Review of Systems  Unable to obtain due to baseline dementia             Objective     Physical Exam:      Vital Signs  /60 (BP Location: Right arm, Patient Position: Lying)   Pulse 66   Temp 98.5 °F (36.9 °C) (Axillary)   Resp  "18   Ht 172.7 cm (67.99\")   Wt 99.8 kg (220 lb 0.3 oz)   LMP  (LMP Unknown)   SpO2 98%   BMI 33.46 kg/m²   No intake or output data in the 24 hours ending 03/21/23 1442      Physical Exam:      03/21/23  0021 03/21/23  1432   Weight: 99.8 kg (220 lb) 99.8 kg (220 lb 0.3 oz)    Body mass index is 33.46 kg/m².  Constitution: No acute distress. obese  Head: Normocephalic, atraumatic.   Eyes: Aligned without strabismus. Conjunctiva noninjected   Ears, Nose, Mouth:  No lesions appreciated   Respiratory: Symmetric chest expansion. No respiratory distress.   Gastrointestinal:  soft, ND, NT. Prior gastrostomy defect partially closed   Neurologic: No gross deficits.  Alert and awake but not communicative  Psychiatric: Normal mood                    Assessment and Plan:    82 y.o. demented female with displaced G tube    Discussed with legal guardian.  To endoscopy for EGD with PEG placement  Recommend abdominal binder    Nima Jarvis MD  [facility] Surgery  03/21/23  14:42 EDT      "

## 2023-03-22 VITALS
HEIGHT: 68 IN | OXYGEN SATURATION: 96 % | SYSTOLIC BLOOD PRESSURE: 128 MMHG | BODY MASS INDEX: 33.35 KG/M2 | WEIGHT: 220.02 LBS | RESPIRATION RATE: 18 BRPM | DIASTOLIC BLOOD PRESSURE: 60 MMHG | TEMPERATURE: 98 F | HEART RATE: 66 BPM

## 2023-03-22 LAB
ALBUMIN SERPL-MCNC: 3.4 G/DL (ref 3.5–5.2)
ALBUMIN/GLOB SERPL: 0.9 G/DL
ALP SERPL-CCNC: 100 U/L (ref 39–117)
ALT SERPL W P-5'-P-CCNC: 17 U/L (ref 1–33)
ANION GAP SERPL CALCULATED.3IONS-SCNC: 11.3 MMOL/L (ref 5–15)
AST SERPL-CCNC: 21 U/L (ref 1–32)
BACTERIA UR QL AUTO: ABNORMAL /HPF
BASOPHILS # BLD AUTO: 0.03 10*3/MM3 (ref 0–0.2)
BASOPHILS NFR BLD AUTO: 0.3 % (ref 0–1.5)
BILIRUB SERPL-MCNC: 0.8 MG/DL (ref 0–1.2)
BILIRUB UR QL STRIP: NEGATIVE
BUN SERPL-MCNC: 20 MG/DL (ref 8–23)
BUN/CREAT SERPL: 20 (ref 7–25)
CALCIUM SPEC-SCNC: 10.1 MG/DL (ref 8.6–10.5)
CHLORIDE SERPL-SCNC: 107 MMOL/L (ref 98–107)
CLARITY UR: ABNORMAL
CO2 SERPL-SCNC: 22.7 MMOL/L (ref 22–29)
COLOR UR: YELLOW
CREAT SERPL-MCNC: 1 MG/DL (ref 0.57–1)
DEPRECATED RDW RBC AUTO: 56.1 FL (ref 37–54)
EGFRCR SERPLBLD CKD-EPI 2021: 56.4 ML/MIN/1.73
EOSINOPHIL # BLD AUTO: 0.15 10*3/MM3 (ref 0–0.4)
EOSINOPHIL NFR BLD AUTO: 1.6 % (ref 0.3–6.2)
ERYTHROCYTE [DISTWIDTH] IN BLOOD BY AUTOMATED COUNT: 16.8 % (ref 12.3–15.4)
GLOBULIN UR ELPH-MCNC: 3.8 GM/DL
GLUCOSE SERPL-MCNC: 150 MG/DL (ref 65–99)
GLUCOSE UR STRIP-MCNC: NEGATIVE MG/DL
HCT VFR BLD AUTO: 42.7 % (ref 34–46.6)
HGB BLD-MCNC: 13.5 G/DL (ref 12–15.9)
HGB UR QL STRIP.AUTO: NEGATIVE
HYALINE CASTS UR QL AUTO: ABNORMAL /LPF
IMM GRANULOCYTES # BLD AUTO: 0.03 10*3/MM3 (ref 0–0.05)
IMM GRANULOCYTES NFR BLD AUTO: 0.3 % (ref 0–0.5)
KETONES UR QL STRIP: NEGATIVE
LEUKOCYTE ESTERASE UR QL STRIP.AUTO: ABNORMAL
LYMPHOCYTES # BLD AUTO: 1.95 10*3/MM3 (ref 0.7–3.1)
LYMPHOCYTES NFR BLD AUTO: 20.2 % (ref 19.6–45.3)
MAGNESIUM SERPL-MCNC: 1.9 MG/DL (ref 1.6–2.4)
MCH RBC QN AUTO: 29.2 PG (ref 26.6–33)
MCHC RBC AUTO-ENTMCNC: 31.6 G/DL (ref 31.5–35.7)
MCV RBC AUTO: 92.2 FL (ref 79–97)
MONOCYTES # BLD AUTO: 0.47 10*3/MM3 (ref 0.1–0.9)
MONOCYTES NFR BLD AUTO: 4.9 % (ref 5–12)
NEUTROPHILS NFR BLD AUTO: 7.01 10*3/MM3 (ref 1.7–7)
NEUTROPHILS NFR BLD AUTO: 72.7 % (ref 42.7–76)
NITRITE UR QL STRIP: NEGATIVE
NRBC BLD AUTO-RTO: 0 /100 WBC (ref 0–0.2)
PH UR STRIP.AUTO: 8.5 [PH] (ref 5–8)
PHOSPHATE SERPL-MCNC: 3.2 MG/DL (ref 2.5–4.5)
PLATELET # BLD AUTO: 203 10*3/MM3 (ref 140–450)
PMV BLD AUTO: 10.5 FL (ref 6–12)
POTASSIUM SERPL-SCNC: 4.1 MMOL/L (ref 3.5–5.2)
PROT SERPL-MCNC: 7.2 G/DL (ref 6–8.5)
PROT UR QL STRIP: NEGATIVE
RBC # BLD AUTO: 4.63 10*6/MM3 (ref 3.77–5.28)
RBC # UR STRIP: ABNORMAL /HPF
REF LAB TEST METHOD: ABNORMAL
SODIUM SERPL-SCNC: 141 MMOL/L (ref 136–145)
SP GR UR STRIP: 1.01 (ref 1–1.03)
SQUAMOUS #/AREA URNS HPF: ABNORMAL /HPF
UROBILINOGEN UR QL STRIP: ABNORMAL
WBC # UR STRIP: ABNORMAL /HPF
WBC NRBC COR # BLD: 9.64 10*3/MM3 (ref 3.4–10.8)

## 2023-03-22 PROCEDURE — 83735 ASSAY OF MAGNESIUM: CPT | Performed by: INTERNAL MEDICINE

## 2023-03-22 PROCEDURE — G0378 HOSPITAL OBSERVATION PER HR: HCPCS

## 2023-03-22 PROCEDURE — A9270 NON-COVERED ITEM OR SERVICE: HCPCS | Performed by: INTERNAL MEDICINE

## 2023-03-22 PROCEDURE — 84100 ASSAY OF PHOSPHORUS: CPT | Performed by: INTERNAL MEDICINE

## 2023-03-22 PROCEDURE — 99214 OFFICE O/P EST MOD 30 MIN: CPT | Performed by: INTERNAL MEDICINE

## 2023-03-22 PROCEDURE — 63710000001 LEVOTHYROXINE 50 MCG TABLET: Performed by: INTERNAL MEDICINE

## 2023-03-22 PROCEDURE — 80053 COMPREHEN METABOLIC PANEL: CPT | Performed by: INTERNAL MEDICINE

## 2023-03-22 PROCEDURE — 63710000001 LORAZEPAM 0.5 MG TABLET: Performed by: INTERNAL MEDICINE

## 2023-03-22 PROCEDURE — 85025 COMPLETE CBC W/AUTO DIFF WBC: CPT | Performed by: INTERNAL MEDICINE

## 2023-03-22 PROCEDURE — 63710000001 LAMOTRIGINE 100 MG TABLET: Performed by: INTERNAL MEDICINE

## 2023-03-22 PROCEDURE — 63710000001 TOPIRAMATE PER 25 MG: Performed by: INTERNAL MEDICINE

## 2023-03-22 RX ADMIN — LAMOTRIGINE 100 MG: 100 TABLET ORAL at 06:04

## 2023-03-22 RX ADMIN — LEVOTHYROXINE SODIUM 50 MCG: 50 TABLET ORAL at 06:04

## 2023-03-22 RX ADMIN — LORAZEPAM 0.5 MG: 0.5 TABLET ORAL at 10:06

## 2023-03-22 RX ADMIN — TOPIRAMATE 25 MG: 25 TABLET, FILM COATED ORAL at 10:07

## 2023-03-22 NOTE — CASE MANAGEMENT/SOCIAL WORK
Discharge Planning Assessment  [facility]     Patient Name: Luz Beasley  MRN: [id number]  Today's Date: 3/22/2023    Admit Date: 3/21/2023          Discharge Plan     Row Name 03/22/23 0915       Plan    Final Discharge Disposition Code 03 - skilled nursing facility (SNF)    Final Note Pt is being discharged to [facility] on this date. SS faxed discharge information to [phone] and [facility]. SS notified [facility] per Miroslava. SS contacted pt's guardianSophia [phone]. PCS form filled out. RN to call report once discharge information is received. EMS transportation to be arranged. No other needs identified at this time.                CALOS McnealW

## 2023-03-22 NOTE — CASE MANAGEMENT/SOCIAL WORK
Discharge Planning Assessment  [facility]     Patient Name: Luz Beasley  MRN: [id number]  Today's Date: 3/22/2023    Admit Date: 3/21/2023    Plan: SS received a consult for from [facility]: having surgery to have the PEG replaced. SS contacted [facility] to confirm pt is a resident at [facility]. SS to follow and assist with discharge back to intermediate.     Discharge Plan     Row Name 03/22/23 0900       Plan    Plan SS received a consult for from [facility]: having surgery to have the PEG replaced. SS contacted [facility] per [facility] to confirm pt is a resident at [facility]. SS to follow and assist with discharge back to nursing home.              Continued Care and Services - Admitted Since 3/21/2023     Destination     Service Provider Request Status Selected Services Address Phone Fax Patient Preferred    [facility] Pending - Request Sent N/A [address] [address] [phone] --              Expected Discharge Date and Time     Expected Discharge Date Expected Discharge Time    Mar 22, 2023          Demographic Summary     Row Name 03/22/23 0900       General Information    Admission Type observation    Referral Source physician    Reason for Consult discharge planning  SS received a consult for from [facility]: having surgery to have the PEG replaced.                      SHANI Mcneal

## 2023-03-22 NOTE — PLAN OF CARE
Goal Outcome Evaluation:  Plan of Care Reviewed With: caregiver, patient        Progress: no change   Pt has had not acute changes during shift. Will continue to monitor and follow current plan of care.

## 2023-03-22 NOTE — DISCHARGE SUMMARY
[facility] HOSPITALISTS DISCHARGE SUMMARY    Patient Identification:  Name:  Luz Beasley  Age:  82 y.o.  Sex:  female  :  1940  MRN:  [id number]  Visit Number:  [id number]    Date of Admission: 3/21/2023  Date of Discharge: 3/22/2023, Back to [facility]    PCP: Maricarmen De La Vega MD    DISCHARGE DIAGNOSES  -Gastrostomy malfunction/PEG tube unable to be placed in the ED due to unknown time of dislodgement, present on admission  -History of type II diabetes mellitus  -History of dementia  -History of essential hypertension  -Obesity by BMI, Body mass index is 33.46 kg/m², which affects all aspects of care  -History of hypothyroidism  -History of Hyperparathyroidsm  -History of hyperlipidemia  -History of chronic kidney disease stage IIIa, admission Cr 1.04, baseline around 1.1  -History of cluster headaches  -History of anxiety    CONSULTS   Dr. Jarvis with general surgery  NP Kobi with palliative care    PROCEDURES PERFORMED  3/21/2023:  Upper endoscopy with PEG tube placement    HOSPITAL COURSE  Patient is a 82 y.o. female presented to [facility] on 3/21/2023 after the PEG tube was found beside the patient; the exact time of dislodgement was not known.  In the ED, a new PEG tube could not be treaded through the prior PEG tube site; thus, the patient was placed in observation for further evaluation and treatement.  Please see the admitting history and physical for further details.  General surgery was contacted and the PEG tube was replaced in the OR via endoscopy.  Since this was not the first time that the patient had a PEG tube, Dr. Jarvis stated that the PEG tube could be used immediately.  The patient's previous diet was started and she will need to follow up with speech therapy in the nursing home.  Please note that the nursing home told bedside nurse Brock that the patient does take meds by mouth and she receives the tube feeds at night time  continuously.  Thus, since the Topamax cannot be crushed, the patient was given this orally.    On the day of discharge, the patient appeared to be at her preadmission level.      VITAL SIGNS:  Temp:  [97.1 °F (36.2 °C)-99 °F (37.2 °C)] 98 °F (36.7 °C)  Heart Rate:  [59-88] 66  Resp:  [16-18] 18  BP: (117-181)/(56-96) 128/60  SpO2:  [95 %-99 %] 96 %  on  Flow (L/min):  [2] 2;   Device (Oxygen Therapy): nasal cannula    Body mass index is 33.46 kg/m².  Wt Readings from Last 3 Encounters:   03/21/23 99.8 kg (220 lb 0.3 oz)   01/11/23 99.8 kg (220 lb)   01/10/23 99.8 kg (220 lb)       PHYSICAL EXAM:  Physical Exam  Vitals and nursing note reviewed.   Constitutional:       General: She is sleeping. She is not in acute distress.     Appearance: She is well-developed. She is obese. She is not ill-appearing, toxic-appearing or diaphoretic.   HENT:      Head: Normocephalic and atraumatic.      Right Ear: External ear normal.      Left Ear: External ear normal.      Nose: Nose normal.   Eyes:      General: No scleral icterus.        Right eye: No discharge.         Left eye: No discharge.      Pupils: Pupils are equal, round, and reactive to light.   Cardiovascular:      Rate and Rhythm: Normal rate and regular rhythm.      Pulses: Normal pulses.      Heart sounds: No murmur heard.  Pulmonary:      Effort: Pulmonary effort is normal. No respiratory distress.      Breath sounds: No wheezing or rales.   Abdominal:      General: Bowel sounds are normal. There is no distension.      Comments: Abdominal binder over the new PEG tube.   Musculoskeletal:         General: No swelling or deformity.   Skin:     Capillary Refill: Capillary refill takes less than 2 seconds.      Coloration: Skin is not jaundiced or pale.   Neurological:      Mental Status: She is easily aroused. Mental status is at baseline. She is disoriented.      Cranial Nerves: No cranial nerve deficit.   Psychiatric:         Attention and Perception: Attention  normal.         Mood and Affect: Affect is flat.         Speech: Speech normal.         Behavior: Behavior normal. Behavior is cooperative.         Cognition and Memory: Cognition is impaired.        DISCHARGE DISPOSITION   Stable       Discharge Medications      Continue These Medications      Instructions Start Date   acetaminophen 500 MG tablet  Commonly known as: TYLENOL   500 mg, Oral, Every 6 Hours PRN      aspirin 81 MG chewable tablet   81 mg, Oral, Daily      atorvastatin 80 MG tablet  Commonly known as: LIPITOR   80 mg, Oral, Nightly      bisacodyl 10 MG suppository  Commonly known as: DULCOLAX   10 mg, Rectal, Daily PRN      Januvia 100 MG tablet  Generic drug: SITagliptin   100 mg, Oral, Daily      lamoTRIgine 100 MG tablet  Commonly known as: LaMICtal   100 mg, Oral, Every Morning      lamoTRIgine 25 MG tablet  Commonly known as: LaMICtal   75 mg, Oral, Every Evening      levothyroxine 50 MCG tablet  Commonly known as: SYNTHROID, LEVOTHROID   50 mcg, Oral, Every Early Morning      Linzess 145 MCG capsule capsule  Generic drug: linaclotide   145 mcg, Oral, Daily PRN      LORazepam 0.5 MG tablet  Commonly known as: ATIVAN   0.5 mg, Oral, Every 12 Hours      megestrol 40 MG/ML suspension  Commonly known as: MEGACE   400 mg, Oral, Daily      Namzaric 28-10 MG capsule sustained-release 24 hr  Generic drug: Memantine HCl-Donepezil HCl   1 tablet, Oral, Nightly      potassium chloride 10 MEQ CR tablet  Commonly known as: K-DUR,KLOR-CON   10 mEq, Oral, Daily      senna 8.6 MG tablet  Commonly known as: SENOKOT   1 tablet, Oral, Daily PRN      sertraline 25 MG tablet  Commonly known as: ZOLOFT   75 mg, Oral, Nightly      topiramate 25 MG tablet  Commonly known as: TOPAMAX   25 mg, Oral, 2 Times Daily      vitamin D 1.25 MG (28838 UT) capsule capsule  Commonly known as: ERGOCALCIFEROL   50,000 Units, Oral, Weekly             Diet Instructions     Advance Diet As Tolerated -Target Diet: NPO, tube feeds only       Target Diet: NPO, tube feeds only    Jevity 1.5 60 mL/hour x 12 hours with 40 mL/hour x 12 hours, with start time at 5:30pm        Activity Instructions     Activity as Tolerated          Additional Instructions for the Follow-ups that You Need to Schedule     Discharge Follow-up with PCP   As directed     Currently Documented PCP:    Maricarmen De La Vega MD    PCP Phone Number:    [phone]     Follow Up Details: 1-3 days             TEST  RESULTS PENDING AT DISCHARGE  Pending Labs     Order Current Status    Urine Culture - Urine, Urine, Catheter In process            CODE STATUS  Code Status and Medical Interventions:   Ordered at: 03/21/23 1645     Medical Intervention Limits:    NO intubation (DNI)     Level Of Support Discussed With:    Health Care Surrogate     Code Status (Patient has no pulse and is not breathing):    No CPR (Do Not Attempt to Resuscitate)     Medical Interventions (Patient has pulse or is breathing):    Limited Support     Comments:    maría Cortes     Release to patient:    Routine Release       The ASCVD Risk score (Kadeem DK, et al., 2019) failed to calculate for the following reasons:    The 2019 ASCVD risk score is only valid for ages 40 to 79       Senia Brothers MD  [facility] Hospitalist  03/22/23  08:05 EDT    Please note that this discharge summary required more than 30 minutes to complete.

## 2023-03-22 NOTE — NURSING NOTE
I called [facility] to obtain diet orders for [place] feedings waiting for fax.

## 2023-03-22 NOTE — ANESTHESIA POSTPROCEDURE EVALUATION
Patient: Luz Beasley    Procedure Summary     Date: 03/21/23 Room / Location: [facility] OR 08 /  [facility] OR    Anesthesia Start: 1637 Anesthesia Stop: 1657    Procedure: ESOPHAGOGASTRODUODENOSCOPY WITH PERCUTANEOUS ENDOSCOPIC GASTROSTOMY TUBE INSERTION (Esophagus) Diagnosis:       Gastrostomy tube dysfunction (HCC)      (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])    Surgeons: Nima Jarvis MD Provider: Lavell Her MD    Anesthesia Type: general ASA Status: 3          Anesthesia Type: general    Vitals  Vitals Value Taken Time   /73 03/21/23 1718   Temp 98.6 °F (37 °C) 03/21/23 1718   Pulse 63 03/21/23 1718   Resp 18 03/21/23 1718   SpO2 97 % 03/21/23 1718           Post Anesthesia Care and Evaluation    Patient location during evaluation: PHASE II  Patient participation: complete - patient participated  Level of consciousness: awake and alert  Pain score: 0  Pain management: adequate    Airway patency: patent  Anesthetic complications: No anesthetic complications    Cardiovascular status: acceptable  Respiratory status: acceptable  Hydration status: acceptable

## 2023-03-22 NOTE — NURSING NOTE
Jevity 1.5 @ 60 ml/hr x12 hours Hang around 5:30pm. On feeding 6pm to 7pm.  H20 Flush @ 40 cc/hr x12 hrs.    Orders from [facility].

## 2023-03-22 NOTE — DISCHARGE PLACEMENT REQUEST
"Marie Beasley (82 y.o. Female)     Date of Birth   1940    Social Security Number       Address   [address] [place] [address]    Home Phone   [phone]    MRN   [id number]       Lutheran   None    Marital Status                               Admission Date   3/21/23    Admission Type   Emergency    Admitting Provider   Senia Brothers MD    Attending Provider   Senia Brothers MD    Department, Room/Bed   [address], 3335/1P       Discharge Date       Discharge Disposition   Skilled Nursing Facility (DC - External)    Discharge Destination                               Attending Provider: Senia rBothers MD    Allergies: No Known Allergies    Isolation: None   Infection: None   Code Status: No CPR    Ht: 172.7 cm (67.99\")   Wt: 99.8 kg (220 lb 0.3 oz)    Admission Cmt: None   Principal Problem: 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]                 Active Insurance as of 3/21/2023     Primary Coverage     Payor Plan Insurance Group Employer/Plan Group    MEDICARE MEDICARE A & B      Payor Plan Address Payor Plan Phone Number Payor Plan Fax Number Effective Dates    [address] [phone]  5/1/2005 - None Entered    Former[facility] [id number]       Subscriber Name Subscriber Birth Date Member ID       MARIE BEASLEY 1940 [id number]           Secondary Coverage     Payor Plan Insurance Group Employer/Plan Group    [place]      Payor Plan Address Payor Plan Phone Number Payor Plan Fax Number Effective Dates    [address] [phone]  10/1/2017 - None Entered    [place] [address]       Subscriber Name Subscriber Birth Date Member ID       MARIE BEASLEY 1940 [id number]                 Emergency Contacts      (Rel.) Home Phone Work Phone Mobile Phone    Sophia Craft (Legal Guardian) [phone] -- [phone]    Erika Beasley (Daughter) [phone] -- --            Insurance Information                " MEDICARE/MEDICARE A & B Phone: [phone]    Subscriber: Luz Beasley Subscriber#: [id number]    Group#: -- Precert#: --        [place] Phone: [phone]    Subscriber: Luz Beasley Subscriber#: [id number]    Group#: -- Precert#: --             History & Physical      Arleth Thakkar PA-C at 23 1318     Attestation signed by Senia Brothers MD at 23    I have reviewed this documentation and agree.  I have also discussed the assessment and plan with SU Franklin.  I have also independently seen the patient.  The patient had just returned from the OR getting her endoscopically placed PEG tube.  Patient has dementia and as result she was unable to give me any history.  Please note that no family members were at bedside.  Patient has a history of pulling out her G-tube; there was a dressing over top of the tube and additional blankets in an attempt to prevent the patient from removing the tube.  Dr. Jarvis wrote in the operative note that the tube can be used immediately.  Thus, I will restart the patient's home medications via G-tube.  Unfortunately, we are unable to send the patient back to the nursing home tonight as it is too late in the day for them to accept a transfer.  Therefore, we will monitor the patient overnight and if her G-tube is functioning properly then we will send her back to the nursing home.  We recommend that the patient's abdomen always have a binder on top to prevent her from pulling out the G-tube.  I have asked palliative care to see the patient and talk to the family about further plans down the road should the patient pull out the PEG tube again.                       [facility]  HISTORY & PHYSICAL    Patient Identification:  Name:  Luz Beasley  Age:  82 y.o.  Sex:  female  :  1940  MRN:  [id number]   Visit Number:  [id number]  Admit Date: 3/21/2023   Primary Care Physician:  Ceferino  Maricarmen ARTEAGA MD     Subjective     Chief complaint:   Chief Complaint   Patient presents with   • G Tube Placement      History of presenting illness:   Patient is a 82 y.o. female with past medical history significant for hypertension, obesity, dementia, CKD, hyperparathyroidism, hypothyroidism, hyperlipidemia, anxiety, cluster headaches, and inadequate nutrition s/p gastrostomy tube that presented to the [facility] emergency department for evaluation of G-tube placement.  Patient had a G-tube placed in January 2023 by Dr. Lr.  Nursing home reports that they found the tube out this morning, and are unsure how long it has been out.  On my exam patient resting in ED bed, no family at bedside, patient appears comfortable.Per nursing home patient has not had fevers or other infectious symptoms, no vomiting, normal bowel movements.  Unable to obtain full ROS history due to patient's dementia.  History largely obtained from ED note/nursing notes. On exam, patient had diffuse guarding of abdomen with palpation, and moderate LLQ pain with palpation.     Upon arrival to the ED, vitals were temperature 97.6, HR 93, RR 18, /97, SPO2 100% on room air.  Labs significant for glucose 157, Cr 1.04, BUN 29, GFR 53.8.  These appear to be near patient's baseline.    EKG: Sinus rhythm, LAD.  Confirmed by cardiology, Dr. Mondragon.     Patient has been admitted to the MedSurg unit.  ---------------------------------------------------------------------------------------------------------------------   Review of Systems   Unable to perform ROS: Dementia      ---------------------------------------------------------------------------------------------------------------------   Past Medical History:   Diagnosis Date   • Anxiety    • Cluster headache    • Dementia (HCC)    • Depression    • Diabetes mellitus (HCC)    • Disease of thyroid gland    • Hx of sepsis    • Hyperlipidemia    • Hyperparathyroidism (HCC)    •  Hypertension    • Muscle weakness    • OA (osteoarthritis)    • Obesity    • Senile dementia (HCC)    • UTI (urinary tract infection)    • Vitamin D deficiency    • Vitamin D deficiency disease      Past Surgical History:   Procedure Laterality Date   • CARDIAC CATHETERIZATION  05/05/2006   • CARDIOVASCULAR STRESS TEST  05/05/2006   • COLONOSCOPY  03/21/2008   • ENDOSCOPY W/ PEG TUBE PLACEMENT N/A 1/5/2023    Procedure: ESOPHAGOGASTRODUODENOSCOPY WITH PERCUTANEOUS ENDOSCOPIC GASTROSTOMY TUBE INSERTION;  Surgeon: Austyn Lr MD;  Location: [facility];  Service: Gastroenterology;  Laterality: N/A;   • EXTRACORPOREAL SHOCK WAVE LITHOTRIPSY (ESWL) Right 1/28/2022    Procedure: EXTRACORPOREAL SHOCKWAVE LITHOTRIPSY;  Surgeon: Yusef Willson MD;  Location: [facility];  Service: Urology;  Laterality: Right;   • HYSTERECTOMY     • THYROID SURGERY       Family History   Family history unknown: Yes     Social History     Socioeconomic History   • Marital status:    Tobacco Use   • Smoking status: Never   • Smokeless tobacco: Never   Vaping Use   • Vaping Use: Never used   Substance and Sexual Activity   • Alcohol use: No   • Drug use: No   • Sexual activity: Defer     ---------------------------------------------------------------------------------------------------------------------   Allergies:  Patient has no known allergies.  ---------------------------------------------------------------------------------------------------------------------   Medications below are reported home medications pulling from within the system; at this time, these medications have not been reconciled unless otherwise specified and are in the verification process for further verifcation as current home medications.    Prior to Admission Medications     Prescriptions Last Dose Informant Patient Reported? Taking?    acetaminophen (TYLENOL) 500 MG tablet 3/11/2023 Nursing Home Yes No    Take 1 tablet by mouth Every 6 (Six) Hours  As Needed for Mild Pain or Fever.    aspirin 81 MG chewable tablet 3/20/2023 Nursing Home Yes Yes    Chew 1 tablet Daily.    atorvastatin (LIPITOR) 80 MG tablet 3/20/2023 Nursing Home Yes Yes    Take 1 tablet by mouth Every Night.    bisacodyl (DULCOLAX) 10 MG suppository Unknown Nursing Home Yes No    Insert 1 suppository into the rectum Daily As Needed for Constipation.    Januvia 100 MG tablet 3/20/2023 Nursing Home Yes Yes    Take 1 tablet by mouth Daily.    lamoTRIgine (LaMICtal) 100 MG tablet 3/20/2023 Nursing Home Yes Yes    Take 1 tablet by mouth Every Morning.    levothyroxine (SYNTHROID, LEVOTHROID) 50 MCG tablet 3/21/2023 Nursing Home Yes Yes    Take 1 tablet by mouth Every Morning.    Linzess 145 MCG capsule capsule 3/9/2023 Nursing Home Yes No    Take 1 capsule by mouth Daily As Needed (constipation).    LORazepam (ATIVAN) 0.5 MG tablet 3/20/2023 Nursing Home Yes Yes    Take 1 tablet by mouth Every 12 (Twelve) Hours.    megestrol (MEGACE) 40 MG/ML suspension 3/20/2023 Nursing Home Yes Yes    Take 10 mL by mouth Daily.    NAMZARIC 28-10 MG capsule sustained-release 24 hr 3/20/2023 Nursing Home Yes Yes    Take 28 mg by mouth Every Night.    sertraline (ZOLOFT) 25 MG tablet 3/20/2023 Nursing Home Yes Yes    Take 3 tablets by mouth Every Night.    topiramate (TOPAMAX) 25 MG tablet 3/20/2023 Nursing Home Yes Yes    Take 1 tablet by mouth 2 (Two) Times a Day.    vitamin D (ERGOCALCIFEROL) 1.25 MG (93780 UT) capsule capsule 3/15/2023 Nursing Home Yes No    Take 1 capsule by mouth 1 (One) Time Per Week.    lamoTRIgine (LaMICtal) 25 MG tablet 3/20/2023 Nursing Home Yes Yes    Take 3 tablets by mouth Every Evening.    potassium chloride (K-DUR,KLOR-CON) 10 MEQ CR tablet 3/20/2023 Nursing Home Yes Yes    Take 1 tablet by mouth Daily.    senna 8.6 MG tablet 3/9/2023 Nursing Home Yes No    Take 1 tablet by mouth Daily As Needed for Constipation.         ---------------------------------------------------------------------------------------------------------------------    Objective     Hospital Scheduled Meds:         Current listed hospital scheduled medications may not yet reflect those currently placed in orders that are signed and held, awaiting patient's arrival to floor/unit.    ---------------------------------------------------------------------------------------------------------------------   Vital Signs:  Temp:  [97.6 °F (36.4 °C)-98.5 °F (36.9 °C)] 98.5 °F (36.9 °C)  Heart Rate:  [59-93] 66  Resp:  [18] 18  BP: (125-139)/(60-97) 125/60  Mean Arterial Pressure (Non-Invasive) for the past 24 hrs (Last 3 readings):   Noninvasive MAP (mmHg)   03/21/23 0855 117     SpO2 Percentage    03/21/23 1256 03/21/23 1318 03/21/23 1325   SpO2: 96% 95% 98%     SpO2:  [95 %-100 %] 98 %  on   ;   Device (Oxygen Therapy): room air    Body mass index is 33.46 kg/m².  Wt Readings from Last 3 Encounters:   03/21/23 99.8 kg (220 lb 0.3 oz)   01/11/23 99.8 kg (220 lb)   01/10/23 99.8 kg (220 lb)     ---------------------------------------------------------------------------------------------------------------------   Physical Exam:  Physical Exam  Constitutional:       General: She is not in acute distress.     Appearance: Normal appearance.   HENT:      Head: Normocephalic and atraumatic.      Right Ear: External ear normal.      Left Ear: External ear normal.      Nose: No nasal deformity.      Mouth/Throat:      Lips: Pink.      Mouth: Mucous membranes are moist.   Eyes:      Conjunctiva/sclera: Conjunctivae normal.      Pupils: Pupils are equal, round, and reactive to light.   Cardiovascular:      Rate and Rhythm: Normal rate and regular rhythm.      Pulses:           Dorsalis pedis pulses are 2+ on the right side and 2+ on the left side.      Heart sounds: Normal heart sounds.   Pulmonary:      Effort: Pulmonary effort is normal.      Breath sounds: Normal breath  sounds. No wheezing, rhonchi or rales.   Abdominal:      General: Abdomen is protuberant. Bowel sounds are normal.      Palpations: Abdomen is soft.      Tenderness: There is abdominal tenderness (moderate, LLQ). There is guarding (difuse ). There is no rebound.          Comments: Scabbing noted in area where g-tube was previously placed.    Genitourinary:     Comments: No burnette catheter in place   Musculoskeletal:      Cervical back: Neck supple. Normal range of motion.      Right lower leg: No edema.      Left lower leg: No edema.   Skin:     General: Skin is warm and dry.   Neurological:      Mental Status: She is alert. She is disoriented.      Comments: Patient answers questions appropriately.   Oriented to self, but not place, time, or situation.    Psychiatric:         Mood and Affect: Mood normal.         Behavior: Behavior normal.       ---------------------------------------------------------------------------------------------------------------------  EKG:  EKG: Sinus rhythm, LAD.  Confirmed by cardiology, Dr. Mondragon.         Telemetry:    Patient not currently on Telemetry at this time.     I have personally reviewed the EKG/Telemetry strip  ---------------------------------------------------------------------------------------------------------------------             Results from last 7 days   Lab Units 03/21/23  0159   WBC 10*3/mm3 8.05   HEMOGLOBIN g/dL 13.3   HEMATOCRIT % 42.1   MCV fL 91.5   MCHC g/dL 31.6   PLATELETS 10*3/mm3 209   INR  0.96     Results from last 7 days   Lab Units 03/21/23  0159   SODIUM mmol/L 139   POTASSIUM mmol/L 4.2   CHLORIDE mmol/L 105   CO2 mmol/L 24.5   BUN mg/dL 29*   CREATININE mg/dL 1.04*   CALCIUM mg/dL 9.8   GLUCOSE mg/dL 157*   ALBUMIN g/dL 3.6   BILIRUBIN mg/dL 0.5   ALK PHOS U/L 108   AST (SGOT) U/L 21   ALT (SGPT) U/L 17   Estimated Creatinine Clearance: 51.6 mL/min (A) (by C-G formula based on SCr of 1.04 mg/dL (H)).  No results found for: AMMONIA    No  results found for: HGBA1C, POCGLU  Lab Results   Component Value Date    HGBA1C 7.00 (H) 03/21/2018     Lab Results   Component Value Date    TSH 1.221 03/21/2018    FREET4 1.16 06/12/2017       No results found for: BLOODCX  No results found for: URINECX  No results found for: WOUNDCX  No results found for: STOOLCX  No results found for: RESPCX  No results found for: MRSACX  Pain Management Panel    There is no flowsheet data to display.       I have personally reviewed the above laboratory results.   ---------------------------------------------------------------------------------------------------------------------  Imaging Results (Last 7 Days)     Procedure Component Value Units Date/Time    XR Chest 1 View [[id number]] Collected: 03/21/23 1428     Updated: 03/21/23 1431    Narrative:      EXAM:    XR Chest, 1 View     EXAM DATE:    3/21/2023 1:34 PM     CLINICAL HISTORY:    screening; K94.23-Gastrostomy malfunction     TECHNIQUE:    Frontal view of the chest.     COMPARISON:    03/23/2020     FINDINGS:    LUNGS:  Bronchial wall thickening suggestive of chronic bronchitis  noted.  Increased rounded opacity of the right inferior hilum that  probably represents vascular, however CT scan of the chest is  recommended for further evaluation.  Coarsened interstitial markings  noted throughout the lungs.    PLEURAL SPACE:  Unremarkable.  No pneumothorax.    HEART:  Unremarkable.  No cardiomegaly.    MEDIASTINUM:  Unremarkable.    BONES/JOINTS:  Unremarkable.       Impression:      1.  Bronchial wall thickening suggestive of chronic bronchitis noted.  2.  Increased rounded opacity of the right inferior hilum that probably  represents vascular, however CT scan of the chest is recommended for  further evaluation.     This report was finalized on 3/21/2023 2:29 PM by Dr. Juan Benavidez MD.           I have personally reviewed the above radiology results.     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  · Dr. Jarvis, general surgeon, was consulted and plans to do EGD with PEG placement and recommends abdominal binder.   · Pre-op orders put in.   Type II diabetes mellitus, mild hyperglycemia  Start sliding scale insulin for now, titrate insulin therapy as necessary.   Hold any oral hypoglycemics to prevent hypoglycemia. Will review home diabetes medications once available per pharmacy.   Hypoglycemia protocol in place if necessary.   Accuchecks QAC and QHS.      Chronic Conditions:   · Dementia  · Continue home meds pending pharmacy med rec  Essential hypertension  BP appears well controlled   Plan to resume home antihypertensive regimen once reconciled per pharmacy with appropriate holding parameters to prevent hypotension and/or bradycardia once G-tube is in place  Closely monitor BP per hospital protocol, titrate medications as necessary  Obesity by BMI, Body mass index is 33.46 kg/m².  Affecting all aspects of care  · Hypothyroidism  · Unable to restart home medication due to g-tube being displaced  · Hyperparathyroidsm  · Hyperlipidemia  · Unable to restart home statin due to g-tube being displaced  Chronic Kidney Disease Stage IIIa  Patient presented with Cr 1.04, baseline around 1.1  Trend Cr and urine output, avoid nephrotoxins, NSAIDs, dehydration and contrast as able.   · Anxiety  · Unable to restart home medication due to g-tube being displaced  · Cluster headaches  · Supportive care  · F/E/N: No IVF at this time. Monitor electrolytes. NPO.    ---------------------------------------------------  DVT Prophylaxis: SCDs    The patient is considered to be a  high risk patient due to: advanced age, comorbidities.     OBSERVATION status, however if further evaluation or treatment plans warrant, status may change.  Based upon current information, I predict patient's care encounter to be less than or equal to 2 midnights.      Code Status: Full code    Disposition/Discharge planning: pending clinical course, discharge back to nursing home.     I have discussed the patient's assessment and plan with Dr. Brothers.       Arleth Thakkar PA-C  Hospitalist Service -- [facility]       03/21/23  15:36 EDT    Attending Physician: Senia Brothers MD        Electronically signed by Senia Brothers MD at 03/21/23 1943         Current Facility-Administered Medications   Medication Dose Route Frequency Provider Last Rate Last Admin   • acetaminophen (TYLENOL) tablet 500 mg  500 mg Oral Q6H PRN Senia Brothers MD   500 mg at 03/21/23 2326   • aspirin chewable tablet 81 mg  81 mg Oral Daily Senia Brothers MD   81 mg at 03/21/23 2216   • atorvastatin (LIPITOR) tablet 80 mg  80 mg Oral Nightly Senia Brothers MD   80 mg at 03/21/23 2216   • bisacodyl (DULCOLAX) suppository 10 mg  10 mg Rectal Daily PRN Nima Jarvis MD       • lamoTRIgine (LaMICtal) tablet 100 mg  100 mg Oral QAM Senia Brothers MD   100 mg at 03/22/23 0604   • lamoTRIgine (LaMICtal) tablet 75 mg  75 mg Oral Q PM Senia Brothers MD   75 mg at 03/21/23 2216   • levothyroxine (SYNTHROID, LEVOTHROID) tablet 50 mcg  50 mcg Oral Q AM Senia Brothers MD   50 mcg at 03/22/23 0604   • linagliptin (TRADJENTA) tablet 5 mg  5 mg Oral Daily Senia Brothers MD   5 mg at 03/21/23 2216   • LORazepam (ATIVAN) tablet 0.5 mg  0.5 mg Oral Q12H Senia Brothers MD   0.5 mg at 03/21/23 2222   • megestrol (MEGACE) 40 MG/ML suspension 400 mg  400 mg Oral Daily Senia Brothers MD   400 mg at 03/21/23 2222   • sertraline (ZOLOFT) tablet 75 mg  75 mg Oral Nightly Senia Brothers MD   75 mg at  03/21/23 2223   • topiramate (TOPAMAX) tablet 25 mg  25 mg Oral BID Senia Bosch MD   25 mg at 03/21/23 2223     Physician Progress Notes (most recent note)    No notes of this type exist for this encounter.         Physical Therapy Notes (most recent note)    No notes exist for this encounter.         Occupational Therapy Notes (most recent note)    No notes exist for this encounter.         Speech Language Pathology Notes (most recent note)    No notes exist for this encounter.         ADL Documentation (most recent)    Flowsheet Row Most Recent Value   Transferring 4 - completely dependent   Toileting 4 - completely dependent   Bathing 4 - completely dependent   Dressing 4 - completely dependent   Eating other (see comments)  [NPO]   Communication 2 - difficulty understanding (not related to language barrier)   Swallowing other (see comments)  [PT NPO]   Equipment Currently Used at Home other (see comments)          Discharge Summary    No notes of this type exist for this encounter.         Discharge Order (From admission, onward)     Start     Ordered    03/22/23 0804  Discharge patient  Once        Expected Discharge Date: 03/22/23    Discharge Disposition: Skilled Nursing Facility (DC - External)    Physician of Record for Attribution - Please select from Treatment Team: SENIA BOSCH [[id number]]    Review needed by CMO to determine Physician of Record: No       Question Answer Comment   Physician of Record for Attribution - Please select from Treatment Team SENIA BOSCH    Review needed by CMO to determine Physician of Record No        03/22/23 0805

## 2023-03-22 NOTE — CASE MANAGEMENT/SOCIAL WORK
Case Management/Social Work    Patient Name:  Luz Beasley  YOB: 1940  MRN: [id number]  Admit Date:  3/21/2023      SS was notified by Erasmo GRECO who states [facility] is unavailable at this time. SS contacted [facility] [phone] who states they will dispatch and send a truck to transport pt to [facility].       Electronically signed by:  SHANI Mcneal  03/22/23 12:59 EDT

## 2023-03-24 LAB
BACTERIA SPEC AEROBE CULT: ABNORMAL
BACTERIA SPEC AEROBE CULT: ABNORMAL

## 2023-06-14 ENCOUNTER — LAB REQUISITION (OUTPATIENT)
Dept: LAB | Facility: HOSPITAL | Age: 83
End: 2023-06-14
Payer: MEDICARE

## 2023-06-14 DIAGNOSIS — E21.3 HYPERPARATHYROIDISM, UNSPECIFIED: ICD-10-CM

## 2023-06-14 DIAGNOSIS — E03.9 HYPOTHYROIDISM, UNSPECIFIED: ICD-10-CM

## 2023-06-14 DIAGNOSIS — R53.83 OTHER FATIGUE: ICD-10-CM

## 2023-06-14 LAB
ALBUMIN SERPL-MCNC: 3.4 G/DL (ref 3.5–5.2)
ALBUMIN/GLOB SERPL: 1.3 G/DL
ALP SERPL-CCNC: 101 U/L (ref 39–117)
ALT SERPL W P-5'-P-CCNC: 15 U/L (ref 1–33)
ANION GAP SERPL CALCULATED.3IONS-SCNC: 10.5 MMOL/L (ref 5–15)
AST SERPL-CCNC: 16 U/L (ref 1–32)
BASOPHILS # BLD AUTO: 0.03 10*3/MM3 (ref 0–0.2)
BASOPHILS NFR BLD AUTO: 0.4 % (ref 0–1.5)
BILIRUB SERPL-MCNC: 0.5 MG/DL (ref 0–1.2)
BUN SERPL-MCNC: 24 MG/DL (ref 8–23)
BUN/CREAT SERPL: 18.9 (ref 7–25)
CALCIUM SPEC-SCNC: 9.5 MG/DL (ref 8.6–10.5)
CHLORIDE SERPL-SCNC: 110 MMOL/L (ref 98–107)
CO2 SERPL-SCNC: 23.5 MMOL/L (ref 22–29)
CREAT SERPL-MCNC: 1.27 MG/DL (ref 0.57–1)
DEPRECATED RDW RBC AUTO: 53.3 FL (ref 37–54)
EGFRCR SERPLBLD CKD-EPI 2021: 42 ML/MIN/1.73
EOSINOPHIL # BLD AUTO: 0.27 10*3/MM3 (ref 0–0.4)
EOSINOPHIL NFR BLD AUTO: 3.5 % (ref 0.3–6.2)
ERYTHROCYTE [DISTWIDTH] IN BLOOD BY AUTOMATED COUNT: 15.3 % (ref 12.3–15.4)
GLOBULIN UR ELPH-MCNC: 2.7 GM/DL
GLUCOSE SERPL-MCNC: 195 MG/DL (ref 65–99)
HCT VFR BLD AUTO: 37.1 % (ref 34–46.6)
HGB BLD-MCNC: 11.7 G/DL (ref 12–15.9)
IMM GRANULOCYTES # BLD AUTO: 0.02 10*3/MM3 (ref 0–0.05)
IMM GRANULOCYTES NFR BLD AUTO: 0.3 % (ref 0–0.5)
LYMPHOCYTES # BLD AUTO: 2.29 10*3/MM3 (ref 0.7–3.1)
LYMPHOCYTES NFR BLD AUTO: 29.5 % (ref 19.6–45.3)
MCH RBC QN AUTO: 29.9 PG (ref 26.6–33)
MCHC RBC AUTO-ENTMCNC: 31.5 G/DL (ref 31.5–35.7)
MCV RBC AUTO: 94.9 FL (ref 79–97)
MONOCYTES # BLD AUTO: 0.45 10*3/MM3 (ref 0.1–0.9)
MONOCYTES NFR BLD AUTO: 5.8 % (ref 5–12)
NEUTROPHILS NFR BLD AUTO: 4.71 10*3/MM3 (ref 1.7–7)
NEUTROPHILS NFR BLD AUTO: 60.5 % (ref 42.7–76)
NRBC BLD AUTO-RTO: 0 /100 WBC (ref 0–0.2)
PLATELET # BLD AUTO: 190 10*3/MM3 (ref 140–450)
PMV BLD AUTO: 11 FL (ref 6–12)
POTASSIUM SERPL-SCNC: 4 MMOL/L (ref 3.5–5.2)
PROT SERPL-MCNC: 6.1 G/DL (ref 6–8.5)
RBC # BLD AUTO: 3.91 10*6/MM3 (ref 3.77–5.28)
SODIUM SERPL-SCNC: 144 MMOL/L (ref 136–145)
TSH SERPL DL<=0.05 MIU/L-ACNC: 1.92 UIU/ML (ref 0.27–4.2)
WBC NRBC COR # BLD: 7.77 10*3/MM3 (ref 3.4–10.8)

## 2023-06-14 PROCEDURE — 82306 VITAMIN D 25 HYDROXY: CPT | Performed by: NURSE PRACTITIONER

## 2023-06-14 PROCEDURE — 85025 COMPLETE CBC W/AUTO DIFF WBC: CPT | Performed by: NURSE PRACTITIONER

## 2023-06-14 PROCEDURE — 84443 ASSAY THYROID STIM HORMONE: CPT | Performed by: NURSE PRACTITIONER

## 2023-06-14 PROCEDURE — 80053 COMPREHEN METABOLIC PANEL: CPT | Performed by: NURSE PRACTITIONER

## 2023-06-15 LAB — 25(OH)D3 SERPL-MCNC: 36.5 NG/ML (ref 30–100)

## 2023-07-16 ENCOUNTER — HOSPITAL ENCOUNTER (EMERGENCY)
Facility: HOSPITAL | Age: 83
Discharge: NURSING, REHAB OR LONG-TERM CARE | End: 2023-07-16
Attending: STUDENT IN AN ORGANIZED HEALTH CARE EDUCATION/TRAINING PROGRAM | Admitting: STUDENT IN AN ORGANIZED HEALTH CARE EDUCATION/TRAINING PROGRAM
Payer: MEDICARE

## 2023-07-16 ENCOUNTER — LAB REQUISITION (OUTPATIENT)
Dept: LAB | Facility: HOSPITAL | Age: 83
End: 2023-07-16
Payer: MEDICARE

## 2023-07-16 ENCOUNTER — APPOINTMENT (OUTPATIENT)
Dept: GENERAL RADIOLOGY | Facility: HOSPITAL | Age: 83
End: 2023-07-16
Payer: MEDICARE

## 2023-07-16 VITALS
WEIGHT: 189 LBS | OXYGEN SATURATION: 95 % | SYSTOLIC BLOOD PRESSURE: 116 MMHG | DIASTOLIC BLOOD PRESSURE: 54 MMHG | TEMPERATURE: 98 F | HEART RATE: 68 BPM | HEIGHT: 66 IN | RESPIRATION RATE: 16 BRPM | BODY MASS INDEX: 30.37 KG/M2

## 2023-07-16 DIAGNOSIS — R05.9 COUGH, UNSPECIFIED: ICD-10-CM

## 2023-07-16 DIAGNOSIS — R05.9 COUGH IN ADULT: Primary | ICD-10-CM

## 2023-07-16 LAB
ALBUMIN SERPL-MCNC: 3.2 G/DL (ref 3.5–5.2)
ALBUMIN/GLOB SERPL: 0.8 G/DL
ALP SERPL-CCNC: 100 U/L (ref 39–117)
ALT SERPL W P-5'-P-CCNC: 9 U/L (ref 1–33)
ANION GAP SERPL CALCULATED.3IONS-SCNC: 12 MMOL/L (ref 5–15)
AST SERPL-CCNC: 13 U/L (ref 1–32)
BASOPHILS # BLD AUTO: 0.03 10*3/MM3 (ref 0–0.2)
BASOPHILS # BLD AUTO: 0.04 10*3/MM3 (ref 0–0.2)
BASOPHILS NFR BLD AUTO: 0.3 % (ref 0–1.5)
BASOPHILS NFR BLD AUTO: 0.4 % (ref 0–1.5)
BILIRUB SERPL-MCNC: 0.6 MG/DL (ref 0–1.2)
BUN SERPL-MCNC: 21 MG/DL (ref 8–23)
BUN/CREAT SERPL: 21.2 (ref 7–25)
CALCIUM SPEC-SCNC: 10 MG/DL (ref 8.6–10.5)
CHLORIDE SERPL-SCNC: 106 MMOL/L (ref 98–107)
CO2 SERPL-SCNC: 23 MMOL/L (ref 22–29)
CREAT SERPL-MCNC: 0.99 MG/DL (ref 0.57–1)
CRP SERPL-MCNC: 10.49 MG/DL (ref 0–0.5)
DEPRECATED RDW RBC AUTO: 49.9 FL (ref 37–54)
DEPRECATED RDW RBC AUTO: 50.9 FL (ref 37–54)
EGFRCR SERPLBLD CKD-EPI 2021: 56.7 ML/MIN/1.73
EOSINOPHIL # BLD AUTO: 0.17 10*3/MM3 (ref 0–0.4)
EOSINOPHIL # BLD AUTO: 0.19 10*3/MM3 (ref 0–0.4)
EOSINOPHIL NFR BLD AUTO: 1.7 % (ref 0.3–6.2)
EOSINOPHIL NFR BLD AUTO: 1.8 % (ref 0.3–6.2)
ERYTHROCYTE [DISTWIDTH] IN BLOOD BY AUTOMATED COUNT: 14 % (ref 12.3–15.4)
ERYTHROCYTE [DISTWIDTH] IN BLOOD BY AUTOMATED COUNT: 14.1 % (ref 12.3–15.4)
ERYTHROCYTE [SEDIMENTATION RATE] IN BLOOD: 97 MM/HR (ref 0–30)
GLOBULIN UR ELPH-MCNC: 3.8 GM/DL
GLUCOSE SERPL-MCNC: 217 MG/DL (ref 65–99)
HCT VFR BLD AUTO: 37.2 % (ref 34–46.6)
HCT VFR BLD AUTO: 38.5 % (ref 34–46.6)
HGB BLD-MCNC: 11.5 G/DL (ref 12–15.9)
HGB BLD-MCNC: 11.9 G/DL (ref 12–15.9)
HOLD SPECIMEN: NORMAL
HOLD SPECIMEN: NORMAL
IMM GRANULOCYTES # BLD AUTO: 0.03 10*3/MM3 (ref 0–0.05)
IMM GRANULOCYTES # BLD AUTO: 0.04 10*3/MM3 (ref 0–0.05)
IMM GRANULOCYTES NFR BLD AUTO: 0.3 % (ref 0–0.5)
IMM GRANULOCYTES NFR BLD AUTO: 0.4 % (ref 0–0.5)
LYMPHOCYTES # BLD AUTO: 2.33 10*3/MM3 (ref 0.7–3.1)
LYMPHOCYTES # BLD AUTO: 2.6 10*3/MM3 (ref 0.7–3.1)
LYMPHOCYTES NFR BLD AUTO: 21.8 % (ref 19.6–45.3)
LYMPHOCYTES NFR BLD AUTO: 26.4 % (ref 19.6–45.3)
MCH RBC QN AUTO: 29.7 PG (ref 26.6–33)
MCH RBC QN AUTO: 30.1 PG (ref 26.6–33)
MCHC RBC AUTO-ENTMCNC: 30.9 G/DL (ref 31.5–35.7)
MCHC RBC AUTO-ENTMCNC: 30.9 G/DL (ref 31.5–35.7)
MCV RBC AUTO: 96 FL (ref 79–97)
MCV RBC AUTO: 97.4 FL (ref 79–97)
MONOCYTES # BLD AUTO: 0.66 10*3/MM3 (ref 0.1–0.9)
MONOCYTES # BLD AUTO: 0.79 10*3/MM3 (ref 0.1–0.9)
MONOCYTES NFR BLD AUTO: 6.7 % (ref 5–12)
MONOCYTES NFR BLD AUTO: 7.4 % (ref 5–12)
NEUTROPHILS NFR BLD AUTO: 6.33 10*3/MM3 (ref 1.7–7)
NEUTROPHILS NFR BLD AUTO: 64.5 % (ref 42.7–76)
NEUTROPHILS NFR BLD AUTO: 68.3 % (ref 42.7–76)
NEUTROPHILS NFR BLD AUTO: 7.3 10*3/MM3 (ref 1.7–7)
NRBC BLD AUTO-RTO: 0 /100 WBC (ref 0–0.2)
NRBC BLD AUTO-RTO: 0 /100 WBC (ref 0–0.2)
PLATELET # BLD AUTO: 217 10*3/MM3 (ref 140–450)
PLATELET # BLD AUTO: 222 10*3/MM3 (ref 140–450)
PMV BLD AUTO: 10.2 FL (ref 6–12)
PMV BLD AUTO: 10.8 FL (ref 6–12)
POTASSIUM SERPL-SCNC: 4.3 MMOL/L (ref 3.5–5.2)
PROT SERPL-MCNC: 7 G/DL (ref 6–8.5)
RBC # BLD AUTO: 3.82 10*6/MM3 (ref 3.77–5.28)
RBC # BLD AUTO: 4.01 10*6/MM3 (ref 3.77–5.28)
SODIUM SERPL-SCNC: 141 MMOL/L (ref 136–145)
WBC NRBC COR # BLD: 10.68 10*3/MM3 (ref 3.4–10.8)
WBC NRBC COR # BLD: 9.83 10*3/MM3 (ref 3.4–10.8)
WHOLE BLOOD HOLD COAG: NORMAL
WHOLE BLOOD HOLD SPECIMEN: NORMAL

## 2023-07-16 PROCEDURE — 85025 COMPLETE CBC W/AUTO DIFF WBC: CPT | Performed by: INTERNAL MEDICINE

## 2023-07-16 PROCEDURE — 99283 EMERGENCY DEPT VISIT LOW MDM: CPT

## 2023-07-16 PROCEDURE — 71045 X-RAY EXAM CHEST 1 VIEW: CPT | Performed by: RADIOLOGY

## 2023-07-16 PROCEDURE — 80053 COMPREHEN METABOLIC PANEL: CPT | Performed by: STUDENT IN AN ORGANIZED HEALTH CARE EDUCATION/TRAINING PROGRAM

## 2023-07-16 PROCEDURE — 86140 C-REACTIVE PROTEIN: CPT | Performed by: INTERNAL MEDICINE

## 2023-07-16 PROCEDURE — 85652 RBC SED RATE AUTOMATED: CPT | Performed by: INTERNAL MEDICINE

## 2023-07-16 PROCEDURE — 71045 X-RAY EXAM CHEST 1 VIEW: CPT

## 2023-07-16 PROCEDURE — 36415 COLL VENOUS BLD VENIPUNCTURE: CPT

## 2023-07-16 PROCEDURE — 85025 COMPLETE CBC W/AUTO DIFF WBC: CPT | Performed by: STUDENT IN AN ORGANIZED HEALTH CARE EDUCATION/TRAINING PROGRAM

## 2023-07-16 NOTE — ED PROVIDER NOTES
Subjective   History of Present Illness  Patient is an 83-year-old female with significantly progressed dementia that presents from [facility] with reported coughing episodes from nursing staff. Nursing staff reported that they had a chest x-ray performed which supposedly was interpreted as negative however they sent her to the ER to be evaluated.  She cannot contribute to history of present illness    Review of Systems    Past Medical History:   Diagnosis Date    Anxiety     Cluster headache     Dementia     Depression     Diabetes mellitus     Disease of thyroid gland     Hx of sepsis     Hyperlipidemia     Hyperparathyroidism     Hypertension     Muscle weakness     OA (osteoarthritis)     Obesity     Senile dementia     UTI (urinary tract infection)     Vitamin D deficiency     Vitamin D deficiency disease        No Known Allergies    Past Surgical History:   Procedure Laterality Date    CARDIAC CATHETERIZATION  05/05/2006    CARDIOVASCULAR STRESS TEST  05/05/2006    COLONOSCOPY  03/21/2008    ENDOSCOPY W/ PEG TUBE PLACEMENT N/A 1/5/2023    Procedure: ESOPHAGOGASTRODUODENOSCOPY WITH PERCUTANEOUS ENDOSCOPIC GASTROSTOMY TUBE INSERTION;  Surgeon: Austyn Lr MD;  Location: [facility];  Service: Gastroenterology;  Laterality: N/A;    ENDOSCOPY W/ PEG TUBE PLACEMENT N/A 3/21/2023    Procedure: ESOPHAGOGASTRODUODENOSCOPY WITH PERCUTANEOUS ENDOSCOPIC GASTROSTOMY TUBE INSERTION;  Surgeon: Nima Jarvis MD;  Location: [facility];  Service: General;  Laterality: N/A;    ENDOSCOPY W/ PEG TUBE PLACEMENT N/A 7/5/2023    Procedure: ESOPHAGOGASTRODUODENOSCOPY WITH PERCUTANEOUS ENDOSCOPIC GASTROSTOMY TUBE INSERTION WITH ANESTHESIA;  Surgeon: Nima Jarvis MD;  Location: [facility];  Service: Gastroenterology;  Laterality: N/A;    EXTRACORPOREAL SHOCK WAVE LITHOTRIPSY (ESWL) Right 1/28/2022    Procedure: EXTRACORPOREAL SHOCKWAVE LITHOTRIPSY;  Surgeon: Yusef Willson MD;  Location: [facility];   Service: Urology;  Laterality: Right;    HYSTERECTOMY      THYROID SURGERY         Family History   Family history unknown: Yes       Social History     Socioeconomic History    Marital status:    Tobacco Use    Smoking status: Never    Smokeless tobacco: Never   Vaping Use    Vaping Use: Never used   Substance and Sexual Activity    Alcohol use: No    Drug use: No    Sexual activity: Defer           Objective   Physical Exam  Vitals and nursing note reviewed.   Constitutional:       General: She is not in acute distress.     Appearance: She is well-developed. She is obese. She is not diaphoretic.   HENT:      Head: Normocephalic and atraumatic.      Right Ear: External ear normal.      Left Ear: External ear normal.      Nose: Nose normal.   Eyes:      Conjunctiva/sclera: Conjunctivae normal.      Pupils: Pupils are equal, round, and reactive to light.   Neck:      Vascular: No JVD.      Trachea: No tracheal deviation.   Cardiovascular:      Rate and Rhythm: Normal rate and regular rhythm.      Heart sounds: Normal heart sounds. No murmur heard.  Pulmonary:      Effort: Pulmonary effort is normal. No respiratory distress.      Breath sounds: Normal breath sounds. No wheezing.   Abdominal:      General: Bowel sounds are normal.      Palpations: Abdomen is soft.      Tenderness: There is no abdominal tenderness.   Musculoskeletal:         General: No deformity. Normal range of motion.      Cervical back: Normal range of motion and neck supple.   Skin:     General: Skin is warm and dry.      Coloration: Skin is not pale.      Findings: No erythema or rash.   Neurological:      Mental Status: She is alert and oriented to person, place, and time.      Cranial Nerves: No cranial nerve deficit.   Psychiatric:      Comments: Advanced chronic dementia does not contribute to history of present illness       Procedures       Results for orders placed or performed during the hospital encounter of 07/16/23    Comprehensive Metabolic Panel    Specimen: Hand, Right; Blood   Result Value Ref Range    Glucose 217 (H) 65 - 99 mg/dL    BUN 21 8 - 23 mg/dL    Creatinine 0.99 0.57 - 1.00 mg/dL    Sodium 141 136 - 145 mmol/L    Potassium 4.3 3.5 - 5.2 mmol/L    Chloride 106 98 - 107 mmol/L    CO2 23.0 22.0 - 29.0 mmol/L    Calcium 10.0 8.6 - 10.5 mg/dL    Total Protein 7.0 6.0 - 8.5 g/dL    Albumin 3.2 (L) 3.5 - 5.2 g/dL    ALT (SGPT) 9 1 - 33 U/L    AST (SGOT) 13 1 - 32 U/L    Alkaline Phosphatase 100 39 - 117 U/L    Total Bilirubin 0.6 0.0 - 1.2 mg/dL    Globulin 3.8 gm/dL    A/G Ratio 0.8 g/dL    BUN/Creatinine Ratio 21.2 7.0 - 25.0    Anion Gap 12.0 5.0 - 15.0 mmol/L    eGFR 56.7 (L) >60.0 mL/min/1.73   CBC Auto Differential    Specimen: Hand, Right; Blood   Result Value Ref Range    WBC 10.68 3.40 - 10.80 10*3/mm3    RBC 4.01 3.77 - 5.28 10*6/mm3    Hemoglobin 11.9 (L) 12.0 - 15.9 g/dL    Hematocrit 38.5 34.0 - 46.6 %    MCV 96.0 79.0 - 97.0 fL    MCH 29.7 26.6 - 33.0 pg    MCHC 30.9 (L) 31.5 - 35.7 g/dL    RDW 14.0 12.3 - 15.4 %    RDW-SD 49.9 37.0 - 54.0 fl    MPV 10.2 6.0 - 12.0 fL    Platelets 222 140 - 450 10*3/mm3    Neutrophil % 68.3 42.7 - 76.0 %    Lymphocyte % 21.8 19.6 - 45.3 %    Monocyte % 7.4 5.0 - 12.0 %    Eosinophil % 1.8 0.3 - 6.2 %    Basophil % 0.4 0.0 - 1.5 %    Immature Grans % 0.3 0.0 - 0.5 %    Neutrophils, Absolute 7.30 (H) 1.70 - 7.00 10*3/mm3    Lymphocytes, Absolute 2.33 0.70 - 3.10 10*3/mm3    Monocytes, Absolute 0.79 0.10 - 0.90 10*3/mm3    Eosinophils, Absolute 0.19 0.00 - 0.40 10*3/mm3    Basophils, Absolute 0.04 0.00 - 0.20 10*3/mm3    Immature Grans, Absolute 0.03 0.00 - 0.05 10*3/mm3    nRBC 0.0 0.0 - 0.2 /100 WBC   Green Top (Gel)   Result Value Ref Range    Extra Tube Hold for add-ons.    Lavender Top   Result Value Ref Range    Extra Tube hold for add-on    Gold Top - SST   Result Value Ref Range    Extra Tube Hold for add-ons.    Light Blue Top   Result Value Ref Range    Extra Tube  Hold for add-ons.            ED Course  ED Course as of 07/16/23 1152   Sun Jul 16, 2023   0930 Review of chest x-ray Radia [LK]   1150 Chest x-ray was negative for acute focal consolidation.  Patient does have some fullness in the right perihilar region which unchanged from previous imaging compared to May 2023.  Patient is afebrile with a normal white count.  Patient is medically stable to be discharged back to the nursing home [LK]      ED Course User Index  [LK] Teresa Ramirez DO                                           Medical Decision Making  Problems Addressed:  Cough in adult: complicated acute illness or injury    Amount and/or Complexity of Data Reviewed  Labs: ordered.  Radiology: ordered.        Final diagnoses:   Cough in adult       ED Disposition  ED Disposition       ED Disposition   Discharge    Condition   Stable    Comment   --               Maricarmen De La Vega MD  [address] [place] [address] [id number] [phone]               Medication List      No changes were made to your prescriptions during this visit.            Tersea Ramirez DO  07/16/23 1152

## 2023-09-21 NOTE — ED NOTES
Pts guardian, Sophia Craft, on phone at this time to give consent to treat and admit if necessary.  [phone]  
Detail Level: Detailed

## 2023-10-02 ENCOUNTER — LAB REQUISITION (OUTPATIENT)
Dept: LAB | Facility: HOSPITAL | Age: 83
End: 2023-10-02
Payer: MEDICARE

## 2023-10-02 DIAGNOSIS — R41.82 ALTERED MENTAL STATUS, UNSPECIFIED: ICD-10-CM

## 2023-10-02 DIAGNOSIS — N39.0 URINARY TRACT INFECTION, SITE NOT SPECIFIED: ICD-10-CM

## 2023-10-02 DIAGNOSIS — Z87.440 PERSONAL HISTORY OF URINARY (TRACT) INFECTIONS: ICD-10-CM

## 2023-10-02 LAB
ALBUMIN SERPL-MCNC: 3.7 G/DL (ref 3.5–5.2)
ALBUMIN/GLOB SERPL: 1.2 G/DL
ALP SERPL-CCNC: 128 U/L (ref 39–117)
ALT SERPL W P-5'-P-CCNC: 15 U/L (ref 1–33)
ANION GAP SERPL CALCULATED.3IONS-SCNC: 11.5 MMOL/L (ref 5–15)
AST SERPL-CCNC: 21 U/L (ref 1–32)
BACTERIA UR QL AUTO: ABNORMAL /HPF
BASOPHILS # BLD AUTO: 0.03 10*3/MM3 (ref 0–0.2)
BASOPHILS NFR BLD AUTO: 0.4 % (ref 0–1.5)
BILIRUB SERPL-MCNC: 0.5 MG/DL (ref 0–1.2)
BILIRUB UR QL STRIP: NEGATIVE
BUN SERPL-MCNC: 21 MG/DL (ref 8–23)
BUN/CREAT SERPL: 18.8 (ref 7–25)
CALCIUM SPEC-SCNC: 9.8 MG/DL (ref 8.6–10.5)
CHLORIDE SERPL-SCNC: 105 MMOL/L (ref 98–107)
CLARITY UR: ABNORMAL
CO2 SERPL-SCNC: 26.5 MMOL/L (ref 22–29)
COLOR UR: YELLOW
CREAT SERPL-MCNC: 1.12 MG/DL (ref 0.57–1)
DEPRECATED RDW RBC AUTO: 49 FL (ref 37–54)
EGFRCR SERPLBLD CKD-EPI 2021: 48.9 ML/MIN/1.73
EOSINOPHIL # BLD AUTO: 0.14 10*3/MM3 (ref 0–0.4)
EOSINOPHIL NFR BLD AUTO: 2 % (ref 0.3–6.2)
ERYTHROCYTE [DISTWIDTH] IN BLOOD BY AUTOMATED COUNT: 14 % (ref 12.3–15.4)
GLOBULIN UR ELPH-MCNC: 3.1 GM/DL
GLUCOSE SERPL-MCNC: 195 MG/DL (ref 65–99)
GLUCOSE UR STRIP-MCNC: NEGATIVE MG/DL
HCT VFR BLD AUTO: 39 % (ref 34–46.6)
HGB BLD-MCNC: 12.3 G/DL (ref 12–15.9)
HGB UR QL STRIP.AUTO: ABNORMAL
HYALINE CASTS UR QL AUTO: ABNORMAL /LPF
IMM GRANULOCYTES # BLD AUTO: 0.01 10*3/MM3 (ref 0–0.05)
IMM GRANULOCYTES NFR BLD AUTO: 0.1 % (ref 0–0.5)
KETONES UR QL STRIP: NEGATIVE
LEUKOCYTE ESTERASE UR QL STRIP.AUTO: ABNORMAL
LYMPHOCYTES # BLD AUTO: 1.52 10*3/MM3 (ref 0.7–3.1)
LYMPHOCYTES NFR BLD AUTO: 21.7 % (ref 19.6–45.3)
MCH RBC QN AUTO: 29.9 PG (ref 26.6–33)
MCHC RBC AUTO-ENTMCNC: 31.5 G/DL (ref 31.5–35.7)
MCV RBC AUTO: 94.7 FL (ref 79–97)
MONOCYTES # BLD AUTO: 0.4 10*3/MM3 (ref 0.1–0.9)
MONOCYTES NFR BLD AUTO: 5.7 % (ref 5–12)
NEUTROPHILS NFR BLD AUTO: 4.89 10*3/MM3 (ref 1.7–7)
NEUTROPHILS NFR BLD AUTO: 70.1 % (ref 42.7–76)
NITRITE UR QL STRIP: NEGATIVE
NRBC BLD AUTO-RTO: 0 /100 WBC (ref 0–0.2)
PH UR STRIP.AUTO: 7 [PH] (ref 5–8)
PLATELET # BLD AUTO: 168 10*3/MM3 (ref 140–450)
PMV BLD AUTO: 10.7 FL (ref 6–12)
POTASSIUM SERPL-SCNC: 4.1 MMOL/L (ref 3.5–5.2)
PROT SERPL-MCNC: 6.8 G/DL (ref 6–8.5)
PROT UR QL STRIP: ABNORMAL
RBC # BLD AUTO: 4.12 10*6/MM3 (ref 3.77–5.28)
RBC # UR STRIP: ABNORMAL /HPF
REF LAB TEST METHOD: ABNORMAL
SODIUM SERPL-SCNC: 143 MMOL/L (ref 136–145)
SP GR UR STRIP: 1.01 (ref 1–1.03)
SQUAMOUS #/AREA URNS HPF: ABNORMAL /HPF
UROBILINOGEN UR QL STRIP: ABNORMAL
WBC # UR STRIP: ABNORMAL /HPF
WBC NRBC COR # BLD: 6.99 10*3/MM3 (ref 3.4–10.8)

## 2023-10-02 PROCEDURE — 87186 SC STD MICRODIL/AGAR DIL: CPT | Performed by: INTERNAL MEDICINE

## 2023-10-02 PROCEDURE — 81001 URINALYSIS AUTO W/SCOPE: CPT | Performed by: INTERNAL MEDICINE

## 2023-10-02 PROCEDURE — 85025 COMPLETE CBC W/AUTO DIFF WBC: CPT | Performed by: INTERNAL MEDICINE

## 2023-10-02 PROCEDURE — 87077 CULTURE AEROBIC IDENTIFY: CPT | Performed by: INTERNAL MEDICINE

## 2023-10-02 PROCEDURE — 87086 URINE CULTURE/COLONY COUNT: CPT | Performed by: INTERNAL MEDICINE

## 2023-10-02 PROCEDURE — 80053 COMPREHEN METABOLIC PANEL: CPT | Performed by: INTERNAL MEDICINE

## 2023-10-05 LAB
BACTERIA SPEC AEROBE CULT: ABNORMAL
BACTERIA SPEC AEROBE CULT: ABNORMAL

## 2023-10-27 ENCOUNTER — LAB REQUISITION (OUTPATIENT)
Dept: LAB | Facility: HOSPITAL | Age: 83
End: 2023-10-27
Payer: MEDICARE

## 2023-10-27 DIAGNOSIS — R82.991 HYPOCITRATURIA: ICD-10-CM

## 2023-10-27 DIAGNOSIS — R45.1 RESTLESSNESS AND AGITATION: ICD-10-CM

## 2023-10-27 LAB
BACTERIA UR QL AUTO: ABNORMAL /HPF
BILIRUB UR QL STRIP: NEGATIVE
CLARITY UR: ABNORMAL
COLOR UR: YELLOW
GLUCOSE UR STRIP-MCNC: NEGATIVE MG/DL
HGB UR QL STRIP.AUTO: ABNORMAL
HYALINE CASTS UR QL AUTO: ABNORMAL /LPF
KETONES UR QL STRIP: ABNORMAL
LEUKOCYTE ESTERASE UR QL STRIP.AUTO: ABNORMAL
MUCOUS THREADS URNS QL MICRO: ABNORMAL /HPF
NITRITE UR QL STRIP: NEGATIVE
PH UR STRIP.AUTO: 6.5 [PH] (ref 5–8)
PROT UR QL STRIP: ABNORMAL
RBC # UR STRIP: ABNORMAL /HPF
REF LAB TEST METHOD: ABNORMAL
SP GR UR STRIP: 1.02 (ref 1–1.03)
SQUAMOUS #/AREA URNS HPF: ABNORMAL /HPF
UROBILINOGEN UR QL STRIP: ABNORMAL
WBC # UR STRIP: ABNORMAL /HPF

## 2023-10-27 PROCEDURE — 87077 CULTURE AEROBIC IDENTIFY: CPT | Performed by: INTERNAL MEDICINE

## 2023-10-27 PROCEDURE — 87186 SC STD MICRODIL/AGAR DIL: CPT | Performed by: INTERNAL MEDICINE

## 2023-10-27 PROCEDURE — 81001 URINALYSIS AUTO W/SCOPE: CPT | Performed by: INTERNAL MEDICINE

## 2023-10-27 PROCEDURE — 87086 URINE CULTURE/COLONY COUNT: CPT | Performed by: INTERNAL MEDICINE

## 2023-10-29 LAB — BACTERIA SPEC AEROBE CULT: ABNORMAL

## 2024-01-01 ENCOUNTER — HOSPITAL ENCOUNTER (INPATIENT)
Facility: HOSPITAL | Age: 84
LOS: 6 days | DRG: 951 | End: 2024-04-26
Attending: INTERNAL MEDICINE | Admitting: INTERNAL MEDICINE
Payer: COMMERCIAL

## 2024-01-01 VITALS — OXYGEN SATURATION: 97 % | RESPIRATION RATE: 22 BRPM | HEART RATE: 81 BPM | TEMPERATURE: 96.8 F

## 2024-01-01 PROCEDURE — 25010000002 HYDROMORPHONE 1 MG/ML SOLUTION: Performed by: REGISTERED NURSE

## 2024-01-01 PROCEDURE — 25010000002 MIDAZOLAM PER 1 MG: Performed by: NURSE PRACTITIONER

## 2024-01-01 PROCEDURE — 25010000002 FUROSEMIDE PER 20 MG: Performed by: NURSE PRACTITIONER

## 2024-01-01 PROCEDURE — 25010000002 FUROSEMIDE PER 20 MG: Performed by: REGISTERED NURSE

## 2024-01-01 PROCEDURE — 25010000002 GLYCOPYRROLATE 0.2 MG/ML SOLUTION: Performed by: NURSE PRACTITIONER

## 2024-01-01 PROCEDURE — 25010000002 GLYCOPYRROLATE 0.2 MG/ML SOLUTION: Performed by: REGISTERED NURSE

## 2024-01-01 PROCEDURE — 25010000002 MIDAZOLAM PER 1 MG: Performed by: REGISTERED NURSE

## 2024-01-01 PROCEDURE — 25010000002 HYDROMORPHONE PER 4 MG: Performed by: REGISTERED NURSE

## 2024-01-01 RX ORDER — MAGNESIUM CARB/ALUMINUM HYDROX 105-160MG
30 TABLET,CHEWABLE ORAL 4 TIMES DAILY PRN
Status: DISCONTINUED | OUTPATIENT
Start: 2024-01-01 | End: 2024-04-26 | Stop reason: HOSPADM

## 2024-01-01 RX ORDER — FUROSEMIDE 10 MG/ML
20 INJECTION INTRAMUSCULAR; INTRAVENOUS EVERY 8 HOURS
Status: DISCONTINUED | OUTPATIENT
Start: 2024-01-01 | End: 2024-04-26 | Stop reason: HOSPADM

## 2024-01-01 RX ORDER — GLYCOPYRROLATE 0.2 MG/ML
0.4 INJECTION INTRAMUSCULAR; INTRAVENOUS EVERY 4 HOURS PRN
Status: DISCONTINUED | OUTPATIENT
Start: 2024-01-01 | End: 2024-04-26 | Stop reason: HOSPADM

## 2024-01-01 RX ORDER — FUROSEMIDE 10 MG/ML
20 INJECTION INTRAMUSCULAR; INTRAVENOUS EVERY 8 HOURS PRN
Status: DISCONTINUED | OUTPATIENT
Start: 2024-01-01 | End: 2024-04-26 | Stop reason: HOSPADM

## 2024-01-01 RX ORDER — BISACODYL 10 MG
10 SUPPOSITORY, RECTAL RECTAL DAILY PRN
Status: DISCONTINUED | OUTPATIENT
Start: 2024-01-01 | End: 2024-04-26 | Stop reason: HOSPADM

## 2024-01-01 RX ORDER — LIDOCAINE HYDROCHLORIDE 20 MG/ML
5 SOLUTION OROPHARYNGEAL
Status: DISCONTINUED | OUTPATIENT
Start: 2024-01-01 | End: 2024-04-26 | Stop reason: HOSPADM

## 2024-01-01 RX ORDER — FLUORIDE TOOTHPASTE
1 TOOTHPASTE DENTAL 4 TIMES DAILY PRN
Status: DISCONTINUED | OUTPATIENT
Start: 2024-01-01 | End: 2024-04-26 | Stop reason: HOSPADM

## 2024-01-01 RX ORDER — SCOLOPAMINE TRANSDERMAL SYSTEM 1 MG/1
1 PATCH, EXTENDED RELEASE TRANSDERMAL
Status: DISCONTINUED | OUTPATIENT
Start: 2024-01-01 | End: 2024-04-26 | Stop reason: HOSPADM

## 2024-01-01 RX ORDER — BISACODYL 10 MG
10 SUPPOSITORY, RECTAL RECTAL DAILY
Status: DISCONTINUED | OUTPATIENT
Start: 2024-01-01 | End: 2024-04-26 | Stop reason: HOSPADM

## 2024-01-01 RX ORDER — MIDAZOLAM HYDROCHLORIDE 1 MG/ML
1 INJECTION INTRAMUSCULAR; INTRAVENOUS EVERY 4 HOURS
Status: DISCONTINUED | OUTPATIENT
Start: 2024-01-01 | End: 2024-04-26 | Stop reason: HOSPADM

## 2024-01-01 RX ORDER — HYDROMORPHONE HYDROCHLORIDE 1 MG/ML
0.5 INJECTION, SOLUTION INTRAMUSCULAR; INTRAVENOUS; SUBCUTANEOUS
Status: DISCONTINUED | OUTPATIENT
Start: 2024-01-01 | End: 2024-01-01

## 2024-01-01 RX ORDER — MIDAZOLAM HYDROCHLORIDE 1 MG/ML
1 INJECTION INTRAMUSCULAR; INTRAVENOUS EVERY 6 HOURS
Status: DISCONTINUED | OUTPATIENT
Start: 2024-01-01 | End: 2024-01-01

## 2024-01-01 RX ORDER — MIDAZOLAM HYDROCHLORIDE 1 MG/ML
1 INJECTION INTRAMUSCULAR; INTRAVENOUS
Status: DISCONTINUED | OUTPATIENT
Start: 2024-01-01 | End: 2024-04-26 | Stop reason: HOSPADM

## 2024-01-01 RX ORDER — GLYCOPYRROLATE 0.2 MG/ML
0.2 INJECTION INTRAMUSCULAR; INTRAVENOUS EVERY 4 HOURS
Status: DISCONTINUED | OUTPATIENT
Start: 2024-01-01 | End: 2024-01-01

## 2024-01-01 RX ORDER — GLYCOPYRROLATE 0.2 MG/ML
0.2 INJECTION INTRAMUSCULAR; INTRAVENOUS EVERY 4 HOURS PRN
Status: DISCONTINUED | OUTPATIENT
Start: 2024-01-01 | End: 2024-01-01

## 2024-01-01 RX ORDER — LIDOCAINE HYDROCHLORIDE 20 MG/ML
5 SOLUTION OROPHARYNGEAL EVERY 8 HOURS SCHEDULED
Status: DISCONTINUED | OUTPATIENT
Start: 2024-01-01 | End: 2024-04-26 | Stop reason: HOSPADM

## 2024-01-01 RX ORDER — MIDAZOLAM HYDROCHLORIDE 1 MG/ML
0.5 INJECTION INTRAMUSCULAR; INTRAVENOUS
Status: DISCONTINUED | OUTPATIENT
Start: 2024-01-01 | End: 2024-01-01

## 2024-01-01 RX ORDER — POLYVINYL ALCOHOL 14 MG/ML
1 SOLUTION/ DROPS OPHTHALMIC
Status: DISCONTINUED | OUTPATIENT
Start: 2024-01-01 | End: 2024-04-26 | Stop reason: HOSPADM

## 2024-01-01 RX ORDER — ACETAMINOPHEN 160 MG
TABLET,DISINTEGRATING ORAL 4 TIMES DAILY PRN
Status: DISCONTINUED | OUTPATIENT
Start: 2024-01-01 | End: 2024-04-26 | Stop reason: HOSPADM

## 2024-01-01 RX ADMIN — HYDROMORPHONE HYDROCHLORIDE 1 MG: 1 INJECTION, SOLUTION INTRAMUSCULAR; INTRAVENOUS; SUBCUTANEOUS at 06:02

## 2024-01-01 RX ADMIN — MIDAZOLAM HYDROCHLORIDE 1 MG: 1 INJECTION, SOLUTION INTRAMUSCULAR; INTRAVENOUS at 22:15

## 2024-01-01 RX ADMIN — MINERAL OIL 30 ML: 1000 SOLUTION ORAL at 12:54

## 2024-01-01 RX ADMIN — LIDOCAINE HYDROCHLORIDE 5 ML: 20 SOLUTION ORAL at 21:12

## 2024-01-01 RX ADMIN — HYDROMORPHONE HYDROCHLORIDE 1 MG: 1 INJECTION, SOLUTION INTRAMUSCULAR; INTRAVENOUS; SUBCUTANEOUS at 16:40

## 2024-01-01 RX ADMIN — HYDROMORPHONE HYDROCHLORIDE 0.5 MG: 1 INJECTION, SOLUTION INTRAMUSCULAR; INTRAVENOUS; SUBCUTANEOUS at 20:32

## 2024-01-01 RX ADMIN — LIDOCAINE HYDROCHLORIDE 5 ML: 20 SOLUTION ORAL at 06:01

## 2024-01-01 RX ADMIN — FUROSEMIDE 20 MG: 10 INJECTION, SOLUTION INTRAMUSCULAR; INTRAVENOUS at 06:11

## 2024-01-01 RX ADMIN — HYDROMORPHONE HYDROCHLORIDE 1 MG: 1 INJECTION, SOLUTION INTRAMUSCULAR; INTRAVENOUS; SUBCUTANEOUS at 15:36

## 2024-01-01 RX ADMIN — MIDAZOLAM HYDROCHLORIDE 1 MG: 1 INJECTION, SOLUTION INTRAMUSCULAR; INTRAVENOUS at 02:10

## 2024-01-01 RX ADMIN — GLYCOPYRROLATE 0.2 MG: 0.2 INJECTION INTRAMUSCULAR; INTRAVENOUS at 06:11

## 2024-01-01 RX ADMIN — LIDOCAINE HYDROCHLORIDE 5 ML: 20 SOLUTION ORAL at 06:02

## 2024-01-01 RX ADMIN — MIDAZOLAM HYDROCHLORIDE 0.5 MG: 1 INJECTION, SOLUTION INTRAMUSCULAR; INTRAVENOUS at 09:29

## 2024-01-01 RX ADMIN — HYDROMORPHONE HYDROCHLORIDE 0.5 MG: 1 INJECTION, SOLUTION INTRAMUSCULAR; INTRAVENOUS; SUBCUTANEOUS at 09:30

## 2024-01-01 RX ADMIN — Medication 1 APPLICATION: at 21:59

## 2024-01-01 RX ADMIN — BISACODYL 10 MG: 10 SUPPOSITORY RECTAL at 17:49

## 2024-01-01 RX ADMIN — MIDAZOLAM HYDROCHLORIDE 1 MG: 1 INJECTION, SOLUTION INTRAMUSCULAR; INTRAVENOUS at 06:15

## 2024-01-01 RX ADMIN — HYDROMORPHONE HYDROCHLORIDE 1 MG: 1 INJECTION, SOLUTION INTRAMUSCULAR; INTRAVENOUS; SUBCUTANEOUS at 05:12

## 2024-01-01 RX ADMIN — FUROSEMIDE 20 MG: 10 INJECTION, SOLUTION INTRAMUSCULAR; INTRAVENOUS at 12:53

## 2024-01-01 RX ADMIN — LIDOCAINE HYDROCHLORIDE 5 ML: 20 SOLUTION ORAL at 08:56

## 2024-01-01 RX ADMIN — LIDOCAINE HYDROCHLORIDE 5 ML: 20 SOLUTION ORAL at 15:39

## 2024-01-01 RX ADMIN — HYDROMORPHONE HYDROCHLORIDE 1 MG: 1 INJECTION, SOLUTION INTRAMUSCULAR; INTRAVENOUS; SUBCUTANEOUS at 10:44

## 2024-01-01 RX ADMIN — HYDROMORPHONE HYDROCHLORIDE 1 MG: 1 INJECTION, SOLUTION INTRAMUSCULAR; INTRAVENOUS; SUBCUTANEOUS at 00:00

## 2024-01-01 RX ADMIN — HYDROMORPHONE HYDROCHLORIDE 1 MG: 1 INJECTION, SOLUTION INTRAMUSCULAR; INTRAVENOUS; SUBCUTANEOUS at 08:52

## 2024-01-01 RX ADMIN — HYDROMORPHONE HYDROCHLORIDE 1 MG: 1 INJECTION, SOLUTION INTRAMUSCULAR; INTRAVENOUS; SUBCUTANEOUS at 16:36

## 2024-01-01 RX ADMIN — Medication 1 APPLICATION: at 22:15

## 2024-01-01 RX ADMIN — Medication 1 APPLICATION: at 23:16

## 2024-01-01 RX ADMIN — HYDROMORPHONE HYDROCHLORIDE 1 MG: 1 INJECTION, SOLUTION INTRAMUSCULAR; INTRAVENOUS; SUBCUTANEOUS at 01:20

## 2024-01-01 RX ADMIN — MIDAZOLAM HYDROCHLORIDE 1 MG: 1 INJECTION, SOLUTION INTRAMUSCULAR; INTRAVENOUS at 22:49

## 2024-01-01 RX ADMIN — MIDAZOLAM HYDROCHLORIDE 1 MG: 1 INJECTION, SOLUTION INTRAMUSCULAR; INTRAVENOUS at 17:43

## 2024-01-01 RX ADMIN — GLYCOPYRROLATE 0.2 MG: 0.2 INJECTION INTRAMUSCULAR; INTRAVENOUS at 15:21

## 2024-01-01 RX ADMIN — HYDROMORPHONE HYDROCHLORIDE 1 MG: 1 INJECTION, SOLUTION INTRAMUSCULAR; INTRAVENOUS; SUBCUTANEOUS at 15:21

## 2024-01-01 RX ADMIN — GLYCOPYRROLATE 0.2 MG: 0.2 INJECTION INTRAMUSCULAR; INTRAVENOUS at 08:52

## 2024-01-01 RX ADMIN — GLYCOPYRROLATE 0.4 MG: 0.2 INJECTION INTRAMUSCULAR; INTRAVENOUS at 21:12

## 2024-01-01 RX ADMIN — MIDAZOLAM HYDROCHLORIDE 1 MG: 1 INJECTION, SOLUTION INTRAMUSCULAR; INTRAVENOUS at 12:02

## 2024-01-01 RX ADMIN — HYDROMORPHONE HYDROCHLORIDE 1 MG: 1 INJECTION, SOLUTION INTRAMUSCULAR; INTRAVENOUS; SUBCUTANEOUS at 22:53

## 2024-01-01 RX ADMIN — HYDROMORPHONE HYDROCHLORIDE 1 MG: 1 INJECTION, SOLUTION INTRAMUSCULAR; INTRAVENOUS; SUBCUTANEOUS at 02:10

## 2024-01-01 RX ADMIN — MIDAZOLAM HYDROCHLORIDE 1 MG: 1 INJECTION, SOLUTION INTRAMUSCULAR; INTRAVENOUS at 15:30

## 2024-01-01 RX ADMIN — HYDROMORPHONE HYDROCHLORIDE 1 MG: 1 INJECTION, SOLUTION INTRAMUSCULAR; INTRAVENOUS; SUBCUTANEOUS at 20:30

## 2024-01-01 RX ADMIN — MIDAZOLAM HYDROCHLORIDE 1 MG: 1 INJECTION, SOLUTION INTRAMUSCULAR; INTRAVENOUS at 02:02

## 2024-01-01 RX ADMIN — FUROSEMIDE 20 MG: 10 INJECTION, SOLUTION INTRAMUSCULAR; INTRAVENOUS at 05:12

## 2024-01-01 RX ADMIN — LIDOCAINE HYDROCHLORIDE 5 ML: 20 SOLUTION ORAL at 16:36

## 2024-01-01 RX ADMIN — MIDAZOLAM HYDROCHLORIDE 1 MG: 1 INJECTION, SOLUTION INTRAMUSCULAR; INTRAVENOUS at 22:02

## 2024-01-01 RX ADMIN — HYDROMORPHONE HYDROCHLORIDE 1 MG: 1 INJECTION, SOLUTION INTRAMUSCULAR; INTRAVENOUS; SUBCUTANEOUS at 12:53

## 2024-01-01 RX ADMIN — MIDAZOLAM HYDROCHLORIDE 1 MG: 1 INJECTION, SOLUTION INTRAMUSCULAR; INTRAVENOUS at 15:36

## 2024-01-01 RX ADMIN — FUROSEMIDE 20 MG: 10 INJECTION, SOLUTION INTRAMUSCULAR; INTRAVENOUS at 13:44

## 2024-01-01 RX ADMIN — MINERAL OIL 30 ML: 1000 SOLUTION ORAL at 21:12

## 2024-01-01 RX ADMIN — HYDROMORPHONE HYDROCHLORIDE 1 MG: 1 INJECTION, SOLUTION INTRAMUSCULAR; INTRAVENOUS; SUBCUTANEOUS at 17:27

## 2024-01-01 RX ADMIN — FUROSEMIDE 20 MG: 10 INJECTION, SOLUTION INTRAMUSCULAR; INTRAVENOUS at 15:36

## 2024-01-01 RX ADMIN — MIDAZOLAM HYDROCHLORIDE 1 MG: 1 INJECTION, SOLUTION INTRAMUSCULAR; INTRAVENOUS at 18:07

## 2024-01-01 RX ADMIN — MIDAZOLAM HYDROCHLORIDE 1 MG: 1 INJECTION, SOLUTION INTRAMUSCULAR; INTRAVENOUS at 02:50

## 2024-01-01 RX ADMIN — MIDAZOLAM HYDROCHLORIDE 1 MG: 1 INJECTION, SOLUTION INTRAMUSCULAR; INTRAVENOUS at 02:20

## 2024-01-01 RX ADMIN — MIDAZOLAM HYDROCHLORIDE 1 MG: 1 INJECTION, SOLUTION INTRAMUSCULAR; INTRAVENOUS at 06:01

## 2024-01-01 RX ADMIN — HYDROMORPHONE HYDROCHLORIDE 1 MG: 1 INJECTION, SOLUTION INTRAMUSCULAR; INTRAVENOUS; SUBCUTANEOUS at 09:25

## 2024-01-01 RX ADMIN — MIDAZOLAM HYDROCHLORIDE 1 MG: 1 INJECTION, SOLUTION INTRAMUSCULAR; INTRAVENOUS at 10:41

## 2024-01-01 RX ADMIN — FUROSEMIDE 20 MG: 10 INJECTION, SOLUTION INTRAMUSCULAR; INTRAVENOUS at 22:15

## 2024-01-01 RX ADMIN — HYDROMORPHONE HYDROCHLORIDE 1 MG: 1 INJECTION, SOLUTION INTRAMUSCULAR; INTRAVENOUS; SUBCUTANEOUS at 22:03

## 2024-01-01 RX ADMIN — GLYCOPYRROLATE 0.2 MG: 0.2 INJECTION INTRAMUSCULAR; INTRAVENOUS at 12:54

## 2024-01-01 RX ADMIN — Medication: at 23:16

## 2024-01-01 RX ADMIN — MIDAZOLAM HYDROCHLORIDE 1 MG: 1 INJECTION, SOLUTION INTRAMUSCULAR; INTRAVENOUS at 08:52

## 2024-01-01 RX ADMIN — LIDOCAINE HYDROCHLORIDE 5 ML: 20 SOLUTION ORAL at 22:16

## 2024-01-01 RX ADMIN — MIDAZOLAM HYDROCHLORIDE 1 MG: 1 INJECTION, SOLUTION INTRAMUSCULAR; INTRAVENOUS at 14:15

## 2024-01-01 RX ADMIN — SCOPOLAMINE 1 PATCH: 1.5 PATCH, EXTENDED RELEASE TRANSDERMAL at 13:35

## 2024-01-01 RX ADMIN — HYDROMORPHONE HYDROCHLORIDE 1 MG: 1 INJECTION, SOLUTION INTRAMUSCULAR; INTRAVENOUS; SUBCUTANEOUS at 19:49

## 2024-01-01 RX ADMIN — HYDROMORPHONE HYDROCHLORIDE 1 MG: 1 INJECTION, SOLUTION INTRAMUSCULAR; INTRAVENOUS; SUBCUTANEOUS at 21:12

## 2024-01-01 RX ADMIN — HYDROMORPHONE HYDROCHLORIDE 1 MG: 1 INJECTION, SOLUTION INTRAMUSCULAR; INTRAVENOUS; SUBCUTANEOUS at 13:24

## 2024-01-01 RX ADMIN — HYDROMORPHONE HYDROCHLORIDE 1 MG: 1 INJECTION, SOLUTION INTRAMUSCULAR; INTRAVENOUS; SUBCUTANEOUS at 13:44

## 2024-01-01 RX ADMIN — HYDROMORPHONE HYDROCHLORIDE 1 MG: 1 INJECTION, SOLUTION INTRAMUSCULAR; INTRAVENOUS; SUBCUTANEOUS at 17:49

## 2024-01-01 RX ADMIN — FUROSEMIDE 20 MG: 10 INJECTION, SOLUTION INTRAMUSCULAR; INTRAVENOUS at 21:57

## 2024-01-01 RX ADMIN — FUROSEMIDE 20 MG: 10 INJECTION, SOLUTION INTRAMUSCULAR; INTRAVENOUS at 14:15

## 2024-01-01 RX ADMIN — FUROSEMIDE 20 MG: 10 INJECTION, SOLUTION INTRAMUSCULAR; INTRAVENOUS at 21:12

## 2024-01-01 RX ADMIN — FUROSEMIDE 20 MG: 10 INJECTION, SOLUTION INTRAMUSCULAR; INTRAVENOUS at 06:01

## 2024-01-01 RX ADMIN — MIDAZOLAM HYDROCHLORIDE 1 MG: 1 INJECTION, SOLUTION INTRAMUSCULAR; INTRAVENOUS at 19:44

## 2024-01-01 RX ADMIN — Medication: at 12:54

## 2024-01-01 RX ADMIN — HYDROMORPHONE HYDROCHLORIDE 1 MG: 1 INJECTION, SOLUTION INTRAMUSCULAR; INTRAVENOUS; SUBCUTANEOUS at 22:15

## 2024-01-01 RX ADMIN — LIDOCAINE HYDROCHLORIDE 5 ML: 20 SOLUTION ORAL at 20:23

## 2024-01-01 RX ADMIN — LIDOCAINE HYDROCHLORIDE 5 ML: 20 SOLUTION ORAL at 22:02

## 2024-01-01 RX ADMIN — HYDROMORPHONE HYDROCHLORIDE 1 MG: 1 INJECTION, SOLUTION INTRAMUSCULAR; INTRAVENOUS; SUBCUTANEOUS at 12:02

## 2024-01-01 RX ADMIN — HYDROMORPHONE HYDROCHLORIDE 1 MG: 1 INJECTION, SOLUTION INTRAMUSCULAR; INTRAVENOUS; SUBCUTANEOUS at 17:43

## 2024-01-01 RX ADMIN — HYDROMORPHONE HYDROCHLORIDE 1 MG: 1 INJECTION, SOLUTION INTRAMUSCULAR; INTRAVENOUS; SUBCUTANEOUS at 12:30

## 2024-01-01 RX ADMIN — HYDROMORPHONE HYDROCHLORIDE 1 MG: 1 INJECTION, SOLUTION INTRAMUSCULAR; INTRAVENOUS; SUBCUTANEOUS at 21:56

## 2024-01-01 RX ADMIN — MIDAZOLAM HYDROCHLORIDE 1 MG: 1 INJECTION, SOLUTION INTRAMUSCULAR; INTRAVENOUS at 19:38

## 2024-01-01 RX ADMIN — MIDAZOLAM HYDROCHLORIDE 1 MG: 1 INJECTION, SOLUTION INTRAMUSCULAR; INTRAVENOUS at 06:10

## 2024-01-01 RX ADMIN — HYDROMORPHONE HYDROCHLORIDE 1 MG: 1 INJECTION, SOLUTION INTRAMUSCULAR; INTRAVENOUS; SUBCUTANEOUS at 06:01

## 2024-01-01 RX ADMIN — MIDAZOLAM HYDROCHLORIDE 1 MG: 1 INJECTION, SOLUTION INTRAMUSCULAR; INTRAVENOUS at 10:29

## 2024-01-01 RX ADMIN — MIDAZOLAM HYDROCHLORIDE 1 MG: 1 INJECTION, SOLUTION INTRAMUSCULAR; INTRAVENOUS at 20:22

## 2024-01-01 RX ADMIN — LIDOCAINE HYDROCHLORIDE 5 ML: 20 SOLUTION ORAL at 21:59

## 2024-01-01 RX ADMIN — MIDAZOLAM HYDROCHLORIDE 1 MG: 1 INJECTION, SOLUTION INTRAMUSCULAR; INTRAVENOUS at 00:00

## 2024-01-01 RX ADMIN — LIDOCAINE HYDROCHLORIDE 5 ML: 20 SOLUTION ORAL at 05:14

## 2024-01-01 RX ADMIN — MINERAL OIL 30 ML: 1000 SOLUTION ORAL at 22:15

## 2024-01-01 RX ADMIN — FUROSEMIDE 20 MG: 10 INJECTION, SOLUTION INTRAMUSCULAR; INTRAVENOUS at 22:02

## 2024-01-01 RX ADMIN — Medication 1 APPLICATION: at 12:54

## 2024-01-01 RX ADMIN — HYDROMORPHONE HYDROCHLORIDE 1 MG: 1 INJECTION, SOLUTION INTRAMUSCULAR; INTRAVENOUS; SUBCUTANEOUS at 02:03

## 2024-01-01 RX ADMIN — LIDOCAINE HYDROCHLORIDE 5 ML: 20 SOLUTION ORAL at 13:44

## 2024-01-01 RX ADMIN — GLYCOPYRROLATE 0.2 MG: 0.2 INJECTION INTRAMUSCULAR; INTRAVENOUS at 02:03

## 2024-01-01 RX ADMIN — HYDROMORPHONE HYDROCHLORIDE 1 MG: 1 INJECTION, SOLUTION INTRAMUSCULAR; INTRAVENOUS; SUBCUTANEOUS at 02:20

## 2024-01-01 RX ADMIN — HYDROMORPHONE HYDROCHLORIDE 1 MG: 1 INJECTION, SOLUTION INTRAMUSCULAR; INTRAVENOUS; SUBCUTANEOUS at 06:11

## 2024-01-01 RX ADMIN — MIDAZOLAM HYDROCHLORIDE 1 MG: 1 INJECTION, SOLUTION INTRAMUSCULAR; INTRAVENOUS at 15:59

## 2024-01-01 RX ADMIN — GLYCOPYRROLATE 0.2 MG: 0.2 INJECTION INTRAMUSCULAR; INTRAVENOUS at 18:07

## 2024-01-01 RX ADMIN — MIDAZOLAM HYDROCHLORIDE 1 MG: 1 INJECTION, SOLUTION INTRAMUSCULAR; INTRAVENOUS at 15:18

## 2024-01-01 RX ADMIN — MIDAZOLAM HYDROCHLORIDE 1 MG: 1 INJECTION, SOLUTION INTRAMUSCULAR; INTRAVENOUS at 18:26

## 2024-01-01 RX ADMIN — HYDROMORPHONE HYDROCHLORIDE 1 MG: 1 INJECTION, SOLUTION INTRAMUSCULAR; INTRAVENOUS; SUBCUTANEOUS at 04:34

## 2024-01-01 RX ADMIN — MIDAZOLAM HYDROCHLORIDE 1 MG: 1 INJECTION, SOLUTION INTRAMUSCULAR; INTRAVENOUS at 12:28

## 2024-01-01 RX ADMIN — MIDAZOLAM HYDROCHLORIDE 1 MG: 1 INJECTION, SOLUTION INTRAMUSCULAR; INTRAVENOUS at 22:53

## 2024-01-01 RX ADMIN — HYDROMORPHONE HYDROCHLORIDE 1 MG: 1 INJECTION, SOLUTION INTRAMUSCULAR; INTRAVENOUS; SUBCUTANEOUS at 13:15

## 2024-01-01 RX ADMIN — MINERAL OIL 30 ML: 1000 SOLUTION ORAL at 23:16

## 2024-01-01 RX ADMIN — HYDROMORPHONE HYDROCHLORIDE 1 MG: 1 INJECTION, SOLUTION INTRAMUSCULAR; INTRAVENOUS; SUBCUTANEOUS at 08:35

## 2024-01-19 ENCOUNTER — HOSPITAL ENCOUNTER (EMERGENCY)
Facility: HOSPITAL | Age: 84
Discharge: HOME | End: 2024-01-19
Attending: EMERGENCY MEDICINE
Payer: MEDICARE

## 2024-01-19 VITALS
DIASTOLIC BLOOD PRESSURE: 62 MMHG | TEMPERATURE: 97.5 F | WEIGHT: 188.93 LBS | HEIGHT: 66 IN | RESPIRATION RATE: 18 BRPM | HEART RATE: 71 BPM | OXYGEN SATURATION: 96 % | BODY MASS INDEX: 30.36 KG/M2 | SYSTOLIC BLOOD PRESSURE: 165 MMHG

## 2024-01-19 DIAGNOSIS — T85.528A DISLODGED GASTROSTOMY TUBE: Primary | ICD-10-CM

## 2024-01-19 LAB — GLUCOSE BLDC GLUCOMTR-MCNC: 123 MG/DL (ref 70–130)

## 2024-01-19 PROCEDURE — 99283 EMERGENCY DEPT VISIT LOW MDM: CPT

## 2024-01-19 PROCEDURE — 82948 REAGENT STRIP/BLOOD GLUCOSE: CPT

## 2024-01-20 NOTE — ED PROVIDER NOTES
Subjective   History of Present Illness  Sent from nursing home for displaced G tube    Abdominal Pain  Pain location:  Epigastric  Pain quality: aching    Pain severity:  Mild  Context comment:  G tube displaced  Associated symptoms: fatigue        Review of Systems   Constitutional:  Positive for activity change and fatigue.   HENT: Negative.     Eyes: Negative.    Respiratory: Negative.     Cardiovascular: Negative.    Gastrointestinal:  Positive for abdominal pain.        G tube displaced   Endocrine: Negative.    Genitourinary: Negative.    Allergic/Immunologic: Negative.    Neurological: Negative.    Hematological: Negative.        Past Medical History:   Diagnosis Date    Anxiety     Cluster headache     Dementia     Depression     Diabetes mellitus     Disease of thyroid gland     Hx of sepsis     Hyperlipidemia     Hyperparathyroidism     Hypertension     Muscle weakness     OA (osteoarthritis)     Obesity     Senile dementia     UTI (urinary tract infection)     Vitamin D deficiency     Vitamin D deficiency disease        No Known Allergies    Past Surgical History:   Procedure Laterality Date    CARDIAC CATHETERIZATION  05/05/2006    CARDIOVASCULAR STRESS TEST  05/05/2006    COLONOSCOPY  03/21/2008    ENDOSCOPY W/ PEG TUBE PLACEMENT N/A 1/5/2023    Procedure: ESOPHAGOGASTRODUODENOSCOPY WITH PERCUTANEOUS ENDOSCOPIC GASTROSTOMY TUBE INSERTION;  Surgeon: Austyn Lr MD;  Location: [facility];  Service: Gastroenterology;  Laterality: N/A;    ENDOSCOPY W/ PEG TUBE PLACEMENT N/A 3/21/2023    Procedure: ESOPHAGOGASTRODUODENOSCOPY WITH PERCUTANEOUS ENDOSCOPIC GASTROSTOMY TUBE INSERTION;  Surgeon: Nima Jarvis MD;  Location: [facility];  Service: General;  Laterality: N/A;    ENDOSCOPY W/ PEG TUBE PLACEMENT N/A 7/5/2023    Procedure: ESOPHAGOGASTRODUODENOSCOPY WITH PERCUTANEOUS ENDOSCOPIC GASTROSTOMY TUBE INSERTION WITH ANESTHESIA;  Surgeon: Nima Jarvis MD;  Location: [facility];  Service:  Gastroenterology;  Laterality: N/A;    EXTRACORPOREAL SHOCK WAVE LITHOTRIPSY (ESWL) Right 1/28/2022    Procedure: EXTRACORPOREAL SHOCKWAVE LITHOTRIPSY;  Surgeon: Yusef Willson MD;  Location: [facility];  Service: Urology;  Laterality: Right;    HYSTERECTOMY      THYROID SURGERY         Family History   Family history unknown: Yes       Social History     Socioeconomic History    Marital status:    Tobacco Use    Smoking status: Never    Smokeless tobacco: Never   Vaping Use    Vaping Use: Never used   Substance and Sexual Activity    Alcohol use: No    Drug use: No    Sexual activity: Defer           Objective   Physical Exam  Vitals and nursing note reviewed.   Constitutional:       Appearance: Normal appearance.   HENT:      Head: Normocephalic.      Nose: Nose normal.      Mouth/Throat:      Mouth: Mucous membranes are moist.   Cardiovascular:      Rate and Rhythm: Normal rate and regular rhythm.   Abdominal:      Comments: G tube out, unable to pass new tube   Musculoskeletal:      Cervical back: Normal range of motion.   Neurological:      Mental Status: She is alert.         Procedures           ED Course                                             Medical Decision Making      Final diagnoses:   Dislodged gastrostomy tube       ED Disposition  ED Disposition       ED Disposition   Discharge    Condition   Stable    Comment   --               Maricarmen De La Vega MD  [address] [place] [id number]  [phone]    Schedule an appointment as soon as possible for a visit   If symptoms worsen         Medication List      No changes were made to your prescriptions during this visit.            Juan Castle MD  01/19/24 7821

## 2024-01-20 NOTE — ED NOTES
Speaking with patient's POA, Sophia Thakkar, and providing update on patient's condition. Also spoke with nurse at [facility] to advise her of discharge plans and patient's return to facility.

## 2024-01-24 DIAGNOSIS — K94.23 GASTROSTOMY TUBE DYSFUNCTION: Primary | ICD-10-CM

## 2024-01-24 RX ORDER — SODIUM CHLORIDE 0.9 % (FLUSH) 0.9 %
10 SYRINGE (ML) INJECTION EVERY 12 HOURS SCHEDULED
Status: CANCELLED | OUTPATIENT
Start: 2024-01-26

## 2024-01-24 RX ORDER — SODIUM CHLORIDE 9 MG/ML
40 INJECTION, SOLUTION INTRAVENOUS AS NEEDED
Status: CANCELLED | OUTPATIENT
Start: 2024-01-26

## 2024-01-24 RX ORDER — SODIUM CHLORIDE 0.9 % (FLUSH) 0.9 %
10 SYRINGE (ML) INJECTION AS NEEDED
Status: CANCELLED | OUTPATIENT
Start: 2024-01-26

## 2024-01-25 ENCOUNTER — DOCUMENTATION (OUTPATIENT)
Dept: SURGERY | Facility: CLINIC | Age: 84
End: 2024-01-25
Payer: MEDICARE

## 2024-01-25 NOTE — PROGRESS NOTES
VERBAL CONSENT GIVEN OVER PHONE,  CARLI, DAUGHTER RICARDO KAPLAN GAVE VERBAL CONSENT IN PRESENCE OF GENERAL SURGERY STAFF MEMBERS  FLORA PEREA MA AND DANNIELLE JI MA.    SX:  01/26/24  ARRIVAL TIME OF 10:30 AM.

## 2024-01-26 ENCOUNTER — HOSPITAL ENCOUNTER (OUTPATIENT)
Facility: HOSPITAL | Age: 84
Setting detail: HOSPITAL OUTPATIENT SURGERY
Discharge: HOME | End: 2024-01-26
Attending: SURGERY | Admitting: SURGERY
Payer: MEDICARE

## 2024-01-26 ENCOUNTER — ANESTHESIA EVENT (OUTPATIENT)
Dept: PERIOP | Facility: HOSPITAL | Age: 84
End: 2024-01-26
Payer: MEDICARE

## 2024-01-26 ENCOUNTER — ANESTHESIA (OUTPATIENT)
Dept: PERIOP | Facility: HOSPITAL | Age: 84
End: 2024-01-26
Payer: MEDICARE

## 2024-01-26 VITALS
RESPIRATION RATE: 18 BRPM | BODY MASS INDEX: 30.22 KG/M2 | HEART RATE: 65 BPM | TEMPERATURE: 97 F | WEIGHT: 188 LBS | HEIGHT: 66 IN | SYSTOLIC BLOOD PRESSURE: 117 MMHG | OXYGEN SATURATION: 94 % | DIASTOLIC BLOOD PRESSURE: 49 MMHG

## 2024-01-26 DIAGNOSIS — K94.23 GASTROSTOMY TUBE DYSFUNCTION: ICD-10-CM

## 2024-01-26 PROCEDURE — 25010000002 CEFAZOLIN PER 500 MG: Performed by: NURSE ANESTHETIST, CERTIFIED REGISTERED

## 2024-01-26 PROCEDURE — 25810000003 LACTATED RINGERS PER 1000 ML: Performed by: ANESTHESIOLOGY

## 2024-01-26 PROCEDURE — 25010000002 PROPOFOL 200 MG/20ML EMULSION: Performed by: NURSE ANESTHETIST, CERTIFIED REGISTERED

## 2024-01-26 RX ORDER — OXYCODONE HYDROCHLORIDE AND ACETAMINOPHEN 5; 325 MG/1; MG/1
1 TABLET ORAL ONCE AS NEEDED
Status: DISCONTINUED | OUTPATIENT
Start: 2024-01-26 | End: 2024-01-26 | Stop reason: HOSPADM

## 2024-01-26 RX ORDER — SODIUM CHLORIDE 9 MG/ML
40 INJECTION, SOLUTION INTRAVENOUS AS NEEDED
Status: DISCONTINUED | OUTPATIENT
Start: 2024-01-26 | End: 2024-01-26 | Stop reason: HOSPADM

## 2024-01-26 RX ORDER — SODIUM CHLORIDE 0.9 % (FLUSH) 0.9 %
10 SYRINGE (ML) INJECTION EVERY 12 HOURS SCHEDULED
Status: DISCONTINUED | OUTPATIENT
Start: 2024-01-26 | End: 2024-01-26 | Stop reason: HOSPADM

## 2024-01-26 RX ORDER — SODIUM CHLORIDE 0.9 % (FLUSH) 0.9 %
10 SYRINGE (ML) INJECTION AS NEEDED
Status: DISCONTINUED | OUTPATIENT
Start: 2024-01-26 | End: 2024-01-26 | Stop reason: HOSPADM

## 2024-01-26 RX ORDER — IPRATROPIUM BROMIDE AND ALBUTEROL SULFATE 2.5; .5 MG/3ML; MG/3ML
3 SOLUTION RESPIRATORY (INHALATION) ONCE AS NEEDED
Status: DISCONTINUED | OUTPATIENT
Start: 2024-01-26 | End: 2024-01-26 | Stop reason: HOSPADM

## 2024-01-26 RX ORDER — SODIUM CHLORIDE, SODIUM LACTATE, POTASSIUM CHLORIDE, CALCIUM CHLORIDE 600; 310; 30; 20 MG/100ML; MG/100ML; MG/100ML; MG/100ML
125 INJECTION, SOLUTION INTRAVENOUS ONCE
Status: COMPLETED | OUTPATIENT
Start: 2024-01-26 | End: 2024-01-26

## 2024-01-26 RX ORDER — CEFAZOLIN SODIUM 1 G/3ML
INJECTION, POWDER, FOR SOLUTION INTRAMUSCULAR; INTRAVENOUS AS NEEDED
Status: DISCONTINUED | OUTPATIENT
Start: 2024-01-26 | End: 2024-01-26 | Stop reason: SURG

## 2024-01-26 RX ORDER — LIDOCAINE HYDROCHLORIDE 20 MG/ML
INJECTION, SOLUTION EPIDURAL; INFILTRATION; INTRACAUDAL; PERINEURAL AS NEEDED
Status: DISCONTINUED | OUTPATIENT
Start: 2024-01-26 | End: 2024-01-26 | Stop reason: SURG

## 2024-01-26 RX ORDER — PROPOFOL 10 MG/ML
INJECTION, EMULSION INTRAVENOUS AS NEEDED
Status: DISCONTINUED | OUTPATIENT
Start: 2024-01-26 | End: 2024-01-26 | Stop reason: SURG

## 2024-01-26 RX ORDER — MIDAZOLAM HYDROCHLORIDE 1 MG/ML
0.5 INJECTION INTRAMUSCULAR; INTRAVENOUS
Status: DISCONTINUED | OUTPATIENT
Start: 2024-01-26 | End: 2024-01-26 | Stop reason: HOSPADM

## 2024-01-26 RX ORDER — FENTANYL CITRATE 50 UG/ML
50 INJECTION, SOLUTION INTRAMUSCULAR; INTRAVENOUS
Status: DISCONTINUED | OUTPATIENT
Start: 2024-01-26 | End: 2024-01-26 | Stop reason: HOSPADM

## 2024-01-26 RX ORDER — ONDANSETRON 2 MG/ML
4 INJECTION INTRAMUSCULAR; INTRAVENOUS AS NEEDED
Status: DISCONTINUED | OUTPATIENT
Start: 2024-01-26 | End: 2024-01-26 | Stop reason: HOSPADM

## 2024-01-26 RX ORDER — SODIUM CHLORIDE, SODIUM LACTATE, POTASSIUM CHLORIDE, CALCIUM CHLORIDE 600; 310; 30; 20 MG/100ML; MG/100ML; MG/100ML; MG/100ML
100 INJECTION, SOLUTION INTRAVENOUS ONCE AS NEEDED
Status: DISCONTINUED | OUTPATIENT
Start: 2024-01-26 | End: 2024-01-26 | Stop reason: HOSPADM

## 2024-01-26 RX ADMIN — CEFAZOLIN 2 G: 1 INJECTION, POWDER, FOR SOLUTION INTRAMUSCULAR; INTRAVENOUS; PARENTERAL at 12:52

## 2024-01-26 RX ADMIN — SODIUM CHLORIDE, POTASSIUM CHLORIDE, SODIUM LACTATE AND CALCIUM CHLORIDE: 600; 310; 30; 20 INJECTION, SOLUTION INTRAVENOUS at 12:36

## 2024-01-26 RX ADMIN — LIDOCAINE HYDROCHLORIDE 100 MG: 20 INJECTION, SOLUTION EPIDURAL; INFILTRATION; INTRACAUDAL; PERINEURAL at 12:52

## 2024-01-26 RX ADMIN — PROPOFOL 50 MG: 10 INJECTION, EMULSION INTRAVENOUS at 12:42

## 2024-01-26 RX ADMIN — PROPOFOL 50 MG: 10 INJECTION, EMULSION INTRAVENOUS at 12:45

## 2024-01-26 RX ADMIN — PROPOFOL 100 MG: 10 INJECTION, EMULSION INTRAVENOUS at 12:39

## 2024-01-26 RX ADMIN — PROPOFOL 60 MG: 10 INJECTION, EMULSION INTRAVENOUS at 12:52

## 2024-01-26 NOTE — ANESTHESIA PREPROCEDURE EVALUATION
Anesthesia Evaluation     Patient summary reviewed and Nursing notes reviewed   no history of anesthetic complications:   NPO Solid Status: > 8 hours  NPO Liquid Status: > 8 hours           Airway   Mallampati: II  TM distance: >3 FB  Neck ROM: full  No difficulty expected  Dental    (+) edentulous    Pulmonary - negative pulmonary ROS and normal exam   Cardiovascular - normal exam    ECG reviewed  Rhythm: regular  Rate: normal    (+) hypertension, hyperlipidemia      Neuro/Psych  (+) headaches, psychiatric history, dementia  GI/Hepatic/Renal/Endo    (+) obesity, morbid obesity, renal disease-, diabetes mellitus, thyroid problem hypothyroidism    Musculoskeletal     Abdominal  - normal exam    Abdomen: soft.   Substance History - negative use     OB/GYN negative ob/gyn ROS         Other   arthritis,     ROS/Med Hx Other: Hyperparathroidism    Normal sinus rhythm with sinus arrhythmia  Low voltage QRS  Septal infarct , age undetermined  Abnormal ECG  When compared with ECG of 23-MAR-2020 15:32,  QRS axis shifted right  Septal infarct is now present  QT has shortened  Confirmed by Brooke Plascencia ([id number]) on 7/5/2023 6:12:47 PM             Anesthesia Evaluation     Patient summary reviewed and Nursing notes reviewed   no history of anesthetic complications:  NPO Solid Status: > 8 hours  NPO Liquid Status: > 8 hours           Airway   Mallampati: II  TM distance: >3 FB  Neck ROM: full  No difficulty expected  Dental    (+) edentulous    Pulmonary - negative pulmonary ROS and normal exam   Cardiovascular - normal exam    Rhythm: regular  Rate: normal    (+) hypertension, hyperlipidemia,       Neuro/Psych  (+) headaches, psychiatric history, dementia,    GI/Hepatic/Renal/Endo    (+) morbid obesity,  renal disease, diabetes mellitus, thyroid problem hypothyroidism    Musculoskeletal     Abdominal  - normal exam   Substance History - negative use     OB/GYN negative ob/gyn ROS         Other   arthritis,      ROS/Med Hx Other:  Hyperparathroidism                    Anesthesia Plan    ASA 3     general   total IV anesthesia  intravenous induction     Anesthetic plan, risks, benefits, and alternatives have been provided, discussed and informed consent has been obtained with: legal guardian.  Pre-procedure education provided  Plan discussed with CRNA.        CODE STATUS:              Anesthesia Plan    ASA 3     general   total IV anesthesia  intravenous induction     Anesthetic plan, risks, benefits, and alternatives have been provided, discussed and informed consent has been obtained with: legal guardian.  Pre-procedure education provided  Plan discussed with CRNA.      CODE STATUS:

## 2024-01-26 NOTE — ANESTHESIA POSTPROCEDURE EVALUATION
Patient: Luz Beasley    Procedure Summary       Date: 01/26/24 Room / Location: [facility] OR  /  [facility] OR    Anesthesia Start: 1236 Anesthesia Stop: 1302    Procedure: ESOPHAGOGASTRODUODENOSCOPY WITH PERCUTANEOUS ENDOSCOPIC GASTROSTOMY TUBE INSERTION WITH ANESTHESIA (Esophagus) Diagnosis:       Gastrostomy tube dysfunction      (Gastrostomy tube dysfunction [K94.23])    Surgeons: Nima Jarvis MD Provider: Isma Ye MD    Anesthesia Type: general ASA Status: 3            Anesthesia Type: general    Vitals  Vitals Value Taken Time   /49 01/26/24 1340   Temp 97 °F (36.1 °C) 01/26/24 1303   Pulse 55 01/26/24 1345   Resp 18 01/26/24 1325   SpO2 95 % 01/26/24 1345   Vitals shown include unfiled device data.        Post Anesthesia Care and Evaluation    Patient location during evaluation: PACU  Patient participation: complete - patient participated  Level of consciousness: awake  Pain score: 0  Pain management: satisfactory to patient    Airway patency: patent  Anesthetic complications: No anesthetic complications  PONV Status: none  Cardiovascular status: hemodynamically stable  Respiratory status: nasal cannula  Hydration status: acceptable

## 2024-01-26 NOTE — H&P
Patient Name:  Luz Beasley  YOB: 1940  [id number]           General Surgery History and Physical    Date of Service: 01/26/24    Chief Complaint-displaced PEG tube    Subjective   Unable to obtain HPI from patient due to baseline dementia   Luz Beasley is a 83 y.o. female nursing home resident who has been getting supplemental tube feeds via PEG tube.  Dr. Lr placed a PEG tube in January 2023 and she has since required 2 replacements in March and July for displaced PEG tube by me.  The patient presented to the emergency department on 1/19 with a displaced PEG tube and was unable to be replaced at bedside.        Allergy: No Known Allergies    Medications:    No current facility-administered medications for this encounter.    No current facility-administered medications on file prior to encounter.     Current Outpatient Medications on File Prior to Encounter   Medication Sig    acetaminophen (TYLENOL) 500 MG tablet Take 1 tablet by mouth Every 6 (Six) Hours As Needed for Mild Pain or Fever.    aspirin 81 MG chewable tablet Chew 1 tablet Daily.    atorvastatin (LIPITOR) 80 MG tablet Take 1 tablet by mouth Every Night.    bisacodyl (DULCOLAX) 10 MG suppository Insert 1 suppository into the rectum Daily As Needed for Constipation.    Januvia 100 MG tablet Take 1 tablet by mouth Daily.    lamoTRIgine (LaMICtal) 100 MG tablet Take 1 tablet by mouth Every Morning.    lamoTRIgine (LaMICtal) 25 MG tablet Take 3 tablets by mouth Every Evening.    levothyroxine (SYNTHROID, LEVOTHROID) 50 MCG tablet Take 1 tablet by mouth Every Morning.    Linzess 145 MCG capsule capsule Take 1 capsule by mouth Daily As Needed (constipation).    LORazepam (ATIVAN) 0.5 MG tablet Take 1 tablet by mouth Every 12 (Twelve) Hours.    megestrol (MEGACE) 40 MG/ML suspension Take 10 mL by mouth Daily.    NAMZARIC 28-10 MG capsule sustained-release 24 hr Take 28 mg by mouth Every Night.    potassium chloride (K-DUR,KLOR-CON) 10 MEQ CR  tablet Take 1 tablet by mouth Daily.    senna (SENOKOT) 8.6 MG tablet Take 1 tablet by mouth Daily As Needed for Constipation.    sertraline (ZOLOFT) 25 MG tablet Take 3 tablets by mouth Every Night.    topiramate (TOPAMAX) 25 MG tablet Take 1 tablet by mouth 2 (Two) Times a Day.    vitamin D (ERGOCALCIFEROL) 1.25 MG (85977 UT) capsule capsule Take 1 capsule by mouth 1 (One) Time Per Week.       PMHx:   Past Medical History:   Diagnosis Date    Anxiety     Cluster headache     Dementia     Depression     Diabetes mellitus     Disease of thyroid gland     Hx of sepsis     Hyperlipidemia     Hyperparathyroidism     Hypertension     Muscle weakness     OA (osteoarthritis)     Obesity     Senile dementia     UTI (urinary tract infection)     Vitamin D deficiency     Vitamin D deficiency disease          Past Surgical History:  Past Surgical History:   Procedure Laterality Date    CARDIAC CATHETERIZATION  05/05/2006    CARDIOVASCULAR STRESS TEST  05/05/2006    COLONOSCOPY  03/21/2008    ENDOSCOPY W/ PEG TUBE PLACEMENT N/A 1/5/2023    Procedure: ESOPHAGOGASTRODUODENOSCOPY WITH PERCUTANEOUS ENDOSCOPIC GASTROSTOMY TUBE INSERTION;  Surgeon: Austyn Lr MD;  Location: [facility];  Service: Gastroenterology;  Laterality: N/A;    ENDOSCOPY W/ PEG TUBE PLACEMENT N/A 3/21/2023    Procedure: ESOPHAGOGASTRODUODENOSCOPY WITH PERCUTANEOUS ENDOSCOPIC GASTROSTOMY TUBE INSERTION;  Surgeon: Nima Jarvis MD;  Location: [facility];  Service: General;  Laterality: N/A;    ENDOSCOPY W/ PEG TUBE PLACEMENT N/A 7/5/2023    Procedure: ESOPHAGOGASTRODUODENOSCOPY WITH PERCUTANEOUS ENDOSCOPIC GASTROSTOMY TUBE INSERTION WITH ANESTHESIA;  Surgeon: Nima Jarvis MD;  Location: [facility];  Service: Gastroenterology;  Laterality: N/A;    EXTRACORPOREAL SHOCK WAVE LITHOTRIPSY (ESWL) Right 1/28/2022    Procedure: EXTRACORPOREAL SHOCKWAVE LITHOTRIPSY;  Surgeon: Yusef Willson MD;  Location: [facility];  Service: Urology;  Laterality:  Right;    HYSTERECTOMY      THYROID SURGERY           Family History: Unable to obtain    Social History: Nursing home resident     Review of Systems  Unable to obtain       Objective     Physical Exam:      Vital Signs  LMP  (LMP Unknown)   No intake or output data in the 24 hours ending 01/26/24 1223  There is no height or weight on file to calculate BMI.    Physical Exam:      Constitution: LMP  (LMP Unknown)  . No acute distress.  Obese  Head: Normocephalic, atraumatic.   Eyes: Aligned without strabismus. Conjunctivae noninjected   Ears, Nose, Mouth: No lesions appreciated   Respiratory: Symmetric chest expansion. No respiratory distress.   Gastrointestinal:  soft, nondistended, nontender  Skin:  No cyanosis, clubbing or edema bilaterally   Lymphatics: No abnormal cervical or supraclavicular lymphadenopathy appreciated   Neurologic: No gross deficits. Alert and oriented x3   Psychiatric: normal mood          Assessment & Plan     Assessment and Plan:  83 y.o. debilitated female nursing home resident, partially dependent on PEG tube feeds now with displaced PEG tube    To endoscopy for EGD with PEG tube    Nima Jarvis MD  [facility]  01/26/24  12:23 EST

## 2024-02-02 ENCOUNTER — TELEPHONE (OUTPATIENT)
Dept: SURGERY | Facility: CLINIC | Age: 84
End: 2024-02-02
Payer: MEDICARE

## 2024-02-02 NOTE — TELEPHONE ENCOUNTER
SPOKE WITH AGNIESZKA, , SHE WILL GET MESSAGE TO NURSE FOR [facility] TO INFORM HER OF RECOMMENDATION PER DR. RIVERO.    AT THIS TIME, DR. RIVERO DOES NOT FEEL THAT PLACING ANOTHER G TUBE FOR PATIENT WOULD BE IN BEST INTEREST OF PATIENT AS PATIENT CONTINUES TO BE ABLE TO PULL THE G TUBE OUT SOON AFTER IT BEING PLACED. AS THIS CAN BECOME AN EMERGENT STATE SHOULD PATIENT PULL G TUBE AND MAKING HOLE IN STOMACH. ALONG WITH THE SCAR TISSUE OF THE MANY G TUBES PLACES. PER DR. RIVERO THIS LAST PLACEMENT ON 01/26/24 WAS THE 4TH G TUBE IN LESS THAN A YEAR.  AT THIS TIME, DR. RIVERO RECOMMENDS THAT THE PATIENT BE PLACED ON BOOST NUTRITIONAL SUPPLEMENTS DAILY AND TO RECEIVE VERBAL QUE WHEN MEAL IS PLACED IN FRONT OF HER TO QUE HER TO EAT, TO EVEN POSSIBLE HAND FEEDING PATIENT BY STAFF MEMBER TO ALLOW PATIENT TO GET THE PROPER NUTRITIONAL INTAKE.    PER AGNIESZKA, SHE WILL BE SURE TO RELAY RECOMMENDATIONS TO NURSING AS NURSE WAS NOT AVAILABLE FOR CALL DUE TO ATTENDING TO PATIENT.    IF ANY QUESTIONS INSTRUCTED TO CALL OUR OFFICE [phone].

## 2024-04-19 ENCOUNTER — HOSPITAL ENCOUNTER (EMERGENCY)
Facility: HOSPITAL | Age: 84
Discharge: OTHER INSTITUTION | DRG: 871 | End: 2024-04-20
Attending: STUDENT IN AN ORGANIZED HEALTH CARE EDUCATION/TRAINING PROGRAM
Payer: MEDICARE

## 2024-04-19 ENCOUNTER — APPOINTMENT (OUTPATIENT)
Dept: CT IMAGING | Facility: HOSPITAL | Age: 84
DRG: 871 | End: 2024-04-19
Payer: MEDICARE

## 2024-04-19 ENCOUNTER — APPOINTMENT (OUTPATIENT)
Dept: GENERAL RADIOLOGY | Facility: HOSPITAL | Age: 84
DRG: 871 | End: 2024-04-19
Payer: MEDICARE

## 2024-04-19 DIAGNOSIS — A41.9 SEVERE SEPSIS: Primary | ICD-10-CM

## 2024-04-19 DIAGNOSIS — R65.20 SEVERE SEPSIS: Primary | ICD-10-CM

## 2024-04-19 DIAGNOSIS — N39.0 ACUTE UTI: ICD-10-CM

## 2024-04-19 LAB
A-A DO2: 32.7 MMHG (ref 0–300)
ALBUMIN SERPL-MCNC: 2.5 G/DL (ref 3.5–5.2)
ALBUMIN/GLOB SERPL: 0.5 G/DL
ALP SERPL-CCNC: 544 U/L (ref 39–117)
ALT SERPL W P-5'-P-CCNC: 36 U/L (ref 1–33)
ANION GAP SERPL CALCULATED.3IONS-SCNC: 24.5 MMOL/L (ref 5–15)
ANISOCYTOSIS BLD QL: ABNORMAL
ARTERIAL PATENCY WRIST A: POSITIVE
AST SERPL-CCNC: 71 U/L (ref 1–32)
ATMOSPHERIC PRESS: 729 MMHG
BACTERIA UR QL AUTO: ABNORMAL /HPF
BASE EXCESS BLDA CALC-SCNC: -10 MMOL/L (ref 0–2)
BDY SITE: ABNORMAL
BILIRUB SERPL-MCNC: 1.6 MG/DL (ref 0–1.2)
BILIRUB UR QL STRIP: ABNORMAL
BUN SERPL-MCNC: 183 MG/DL (ref 8–23)
BUN/CREAT SERPL: 23.3 (ref 7–25)
CALCIUM SPEC-SCNC: 9.4 MG/DL (ref 8.6–10.5)
CHLORIDE SERPL-SCNC: 114 MMOL/L (ref 98–107)
CLARITY UR: ABNORMAL
CO2 BLDA-SCNC: 15.9 MMOL/L (ref 22–33)
CO2 SERPL-SCNC: 17.5 MMOL/L (ref 22–29)
COHGB MFR BLD: 1.2 % (ref 0–5)
COLOR UR: ABNORMAL
CREAT SERPL-MCNC: 7.87 MG/DL (ref 0.57–1)
CRP SERPL-MCNC: 46.1 MG/DL (ref 0–0.5)
D-LACTATE SERPL-SCNC: 2.2 MMOL/L (ref 0.5–2)
D-LACTATE SERPL-SCNC: 2.4 MMOL/L (ref 0.5–2)
DEPRECATED RDW RBC AUTO: 52.6 FL (ref 37–54)
EGFRCR SERPLBLD CKD-EPI 2021: 4.7 ML/MIN/1.73
ERYTHROCYTE [DISTWIDTH] IN BLOOD BY AUTOMATED COUNT: 16 % (ref 12.3–15.4)
ERYTHROCYTE [SEDIMENTATION RATE] IN BLOOD: 96 MM/HR (ref 0–30)
FLUAV RNA RESP QL NAA+PROBE: NOT DETECTED
FLUBV RNA ISLT QL NAA+PROBE: NOT DETECTED
GEN 5 2HR TROPONIN T REFLEX: 174 NG/L
GLOBULIN UR ELPH-MCNC: 4.6 GM/DL
GLUCOSE SERPL-MCNC: 322 MG/DL (ref 65–99)
GLUCOSE UR STRIP-MCNC: NEGATIVE MG/DL
HCO3 BLDA-SCNC: 15 MMOL/L (ref 20–26)
HCT VFR BLD AUTO: 41.7 % (ref 34–46.6)
HCT VFR BLD CALC: 38.2 % (ref 38–51)
HGB BLD-MCNC: 13.5 G/DL (ref 12–15.9)
HGB BLDA-MCNC: 12.5 G/DL (ref 13.5–17.5)
HGB UR QL STRIP.AUTO: ABNORMAL
HOLD SPECIMEN: NORMAL
HOLD SPECIMEN: NORMAL
HYALINE CASTS UR QL AUTO: ABNORMAL /LPF
INHALED O2 CONCENTRATION: 21 %
KETONES UR QL STRIP: ABNORMAL
LEUKOCYTE ESTERASE UR QL STRIP.AUTO: ABNORMAL
LYMPHOCYTES # BLD MANUAL: 2.19 10*3/MM3 (ref 0.7–3.1)
LYMPHOCYTES NFR BLD MANUAL: 1 % (ref 5–12)
Lab: ABNORMAL
MAGNESIUM SERPL-MCNC: 3.4 MG/DL (ref 1.6–2.4)
MCH RBC QN AUTO: 29.1 PG (ref 26.6–33)
MCHC RBC AUTO-ENTMCNC: 32.4 G/DL (ref 31.5–35.7)
MCV RBC AUTO: 89.9 FL (ref 79–97)
METHGB BLD QL: <-0.1 % (ref 0–3)
MODALITY: ABNORMAL
MONOCYTES # BLD: 0.22 10*3/MM3 (ref 0.1–0.9)
NEUTROPHILS # BLD AUTO: 19.47 10*3/MM3 (ref 1.7–7)
NEUTROPHILS NFR BLD MANUAL: 80 % (ref 42.7–76)
NEUTS BAND NFR BLD MANUAL: 9 % (ref 0–5)
NEUTS VAC BLD QL SMEAR: ABNORMAL
NITRITE UR QL STRIP: POSITIVE
NRBC SPEC MANUAL: 1 /100 WBC (ref 0–0.2)
NT-PROBNP SERPL-MCNC: ABNORMAL PG/ML (ref 0–1800)
OXYHGB MFR BLDV: 93.9 % (ref 94–99)
PCO2 BLDA: 30 MM HG (ref 35–45)
PCO2 TEMP ADJ BLD: ABNORMAL MM[HG]
PH BLDA: 7.31 PH UNITS (ref 7.35–7.45)
PH UR STRIP.AUTO: 6.5 [PH] (ref 5–8)
PH, TEMP CORRECTED: ABNORMAL
PHOSPHATE SERPL-MCNC: 4.7 MG/DL (ref 2.5–4.5)
PLATELET # BLD AUTO: 39 10*3/MM3 (ref 140–450)
PMV BLD AUTO: ABNORMAL FL
PO2 BLDA: 76.7 MM HG (ref 83–108)
PO2 TEMP ADJ BLD: ABNORMAL MM[HG]
POTASSIUM SERPL-SCNC: 4.5 MMOL/L (ref 3.5–5.2)
PROCALCITONIN SERPL-MCNC: >100 NG/ML (ref 0–0.25)
PROT SERPL-MCNC: 7.1 G/DL (ref 6–8.5)
PROT UR QL STRIP: ABNORMAL
RBC # BLD AUTO: 4.64 10*6/MM3 (ref 3.77–5.28)
RBC # UR STRIP: ABNORMAL /HPF
REF LAB TEST METHOD: ABNORMAL
SAO2 % BLDCOA: 94.2 % (ref 94–99)
SARS-COV-2 RNA RESP QL NAA+PROBE: NOT DETECTED
SCAN SLIDE: NORMAL
SMALL PLATELETS BLD QL SMEAR: ABNORMAL
SODIUM SERPL-SCNC: 156 MMOL/L (ref 136–145)
SP GR UR STRIP: 1.02 (ref 1–1.03)
SQUAMOUS #/AREA URNS HPF: ABNORMAL /HPF
T4 FREE SERPL-MCNC: 0.48 NG/DL (ref 0.93–1.7)
TOXIC GRANULATION: ABNORMAL
TROPONIN T DELTA: -21 NG/L
TROPONIN T SERPL HS-MCNC: 195 NG/L
TSH SERPL DL<=0.05 MIU/L-ACNC: 2.91 UIU/ML (ref 0.27–4.2)
UROBILINOGEN UR QL STRIP: ABNORMAL
VARIANT LYMPHS NFR BLD MANUAL: 10 % (ref 19.6–45.3)
VENTILATOR MODE: ABNORMAL
WBC # UR STRIP: ABNORMAL /HPF
WBC NRBC COR # BLD AUTO: 21.88 10*3/MM3 (ref 3.4–10.8)
WHOLE BLOOD HOLD COAG: NORMAL
WHOLE BLOOD HOLD SPECIMEN: NORMAL

## 2024-04-19 PROCEDURE — 96365 THER/PROPH/DIAG IV INF INIT: CPT

## 2024-04-19 PROCEDURE — 82805 BLOOD GASES W/O2 SATURATION: CPT

## 2024-04-19 PROCEDURE — 25010000002 CEFTRIAXONE PER 250 MG: Performed by: PHYSICIAN ASSISTANT

## 2024-04-19 PROCEDURE — 87186 SC STD MICRODIL/AGAR DIL: CPT | Performed by: PHYSICIAN ASSISTANT

## 2024-04-19 PROCEDURE — 84145 PROCALCITONIN (PCT): CPT | Performed by: EMERGENCY MEDICINE

## 2024-04-19 PROCEDURE — 71250 CT THORAX DX C-: CPT | Performed by: RADIOLOGY

## 2024-04-19 PROCEDURE — 84100 ASSAY OF PHOSPHORUS: CPT | Performed by: EMERGENCY MEDICINE

## 2024-04-19 PROCEDURE — 36415 COLL VENOUS BLD VENIPUNCTURE: CPT

## 2024-04-19 PROCEDURE — 96375 TX/PRO/DX INJ NEW DRUG ADDON: CPT

## 2024-04-19 PROCEDURE — 87636 SARSCOV2 & INF A&B AMP PRB: CPT | Performed by: PHYSICIAN ASSISTANT

## 2024-04-19 PROCEDURE — 71045 X-RAY EXAM CHEST 1 VIEW: CPT | Performed by: RADIOLOGY

## 2024-04-19 PROCEDURE — 87102 FUNGUS ISOLATION CULTURE: CPT | Performed by: STUDENT IN AN ORGANIZED HEALTH CARE EDUCATION/TRAINING PROGRAM

## 2024-04-19 PROCEDURE — 85025 COMPLETE CBC W/AUTO DIFF WBC: CPT | Performed by: PHYSICIAN ASSISTANT

## 2024-04-19 PROCEDURE — 74176 CT ABD & PELVIS W/O CONTRAST: CPT | Performed by: RADIOLOGY

## 2024-04-19 PROCEDURE — 86140 C-REACTIVE PROTEIN: CPT | Performed by: PHYSICIAN ASSISTANT

## 2024-04-19 PROCEDURE — 87154 CUL TYP ID BLD PTHGN 6+ TRGT: CPT | Performed by: PHYSICIAN ASSISTANT

## 2024-04-19 PROCEDURE — 84443 ASSAY THYROID STIM HORMONE: CPT | Performed by: EMERGENCY MEDICINE

## 2024-04-19 PROCEDURE — 81001 URINALYSIS AUTO W/SCOPE: CPT | Performed by: PHYSICIAN ASSISTANT

## 2024-04-19 PROCEDURE — 83735 ASSAY OF MAGNESIUM: CPT | Performed by: EMERGENCY MEDICINE

## 2024-04-19 PROCEDURE — 74176 CT ABD & PELVIS W/O CONTRAST: CPT

## 2024-04-19 PROCEDURE — 82375 ASSAY CARBOXYHB QUANT: CPT

## 2024-04-19 PROCEDURE — 83880 ASSAY OF NATRIURETIC PEPTIDE: CPT | Performed by: PHYSICIAN ASSISTANT

## 2024-04-19 PROCEDURE — 25010000002 LINEZOLID 600 MG/300ML SOLUTION: Performed by: EMERGENCY MEDICINE

## 2024-04-19 PROCEDURE — 70450 CT HEAD/BRAIN W/O DYE: CPT

## 2024-04-19 PROCEDURE — 87147 CULTURE TYPE IMMUNOLOGIC: CPT | Performed by: PHYSICIAN ASSISTANT

## 2024-04-19 PROCEDURE — 83050 HGB METHEMOGLOBIN QUAN: CPT

## 2024-04-19 PROCEDURE — 85652 RBC SED RATE AUTOMATED: CPT | Performed by: EMERGENCY MEDICINE

## 2024-04-19 PROCEDURE — 36600 WITHDRAWAL OF ARTERIAL BLOOD: CPT

## 2024-04-19 PROCEDURE — P9612 CATHETERIZE FOR URINE SPEC: HCPCS

## 2024-04-19 PROCEDURE — 83605 ASSAY OF LACTIC ACID: CPT | Performed by: PHYSICIAN ASSISTANT

## 2024-04-19 PROCEDURE — 93010 ELECTROCARDIOGRAM REPORT: CPT | Performed by: INTERNAL MEDICINE

## 2024-04-19 PROCEDURE — 84484 ASSAY OF TROPONIN QUANT: CPT | Performed by: PHYSICIAN ASSISTANT

## 2024-04-19 PROCEDURE — 71045 X-RAY EXAM CHEST 1 VIEW: CPT

## 2024-04-19 PROCEDURE — 80053 COMPREHEN METABOLIC PANEL: CPT | Performed by: PHYSICIAN ASSISTANT

## 2024-04-19 PROCEDURE — 96367 TX/PROPH/DG ADDL SEQ IV INF: CPT

## 2024-04-19 PROCEDURE — 85007 BL SMEAR W/DIFF WBC COUNT: CPT | Performed by: PHYSICIAN ASSISTANT

## 2024-04-19 PROCEDURE — 87077 CULTURE AEROBIC IDENTIFY: CPT | Performed by: PHYSICIAN ASSISTANT

## 2024-04-19 PROCEDURE — 93005 ELECTROCARDIOGRAM TRACING: CPT | Performed by: EMERGENCY MEDICINE

## 2024-04-19 PROCEDURE — 84439 ASSAY OF FREE THYROXINE: CPT | Performed by: EMERGENCY MEDICINE

## 2024-04-19 PROCEDURE — 99285 EMERGENCY DEPT VISIT HI MDM: CPT

## 2024-04-19 PROCEDURE — 87086 URINE CULTURE/COLONY COUNT: CPT | Performed by: PHYSICIAN ASSISTANT

## 2024-04-19 PROCEDURE — 25810000003 SEPSIS FLUID NS 0.9 % SOLUTION: Performed by: PHYSICIAN ASSISTANT

## 2024-04-19 PROCEDURE — 71250 CT THORAX DX C-: CPT

## 2024-04-19 PROCEDURE — 70450 CT HEAD/BRAIN W/O DYE: CPT | Performed by: RADIOLOGY

## 2024-04-19 PROCEDURE — 93005 ELECTROCARDIOGRAM TRACING: CPT | Performed by: PHYSICIAN ASSISTANT

## 2024-04-19 PROCEDURE — 87040 BLOOD CULTURE FOR BACTERIA: CPT | Performed by: PHYSICIAN ASSISTANT

## 2024-04-19 RX ORDER — DILTIAZEM HYDROCHLORIDE 5 MG/ML
10 INJECTION INTRAVENOUS ONCE
Status: COMPLETED | OUTPATIENT
Start: 2024-04-19 | End: 2024-04-19

## 2024-04-19 RX ORDER — SODIUM CHLORIDE 0.9 % (FLUSH) 0.9 %
10 SYRINGE (ML) INJECTION AS NEEDED
Status: DISCONTINUED | OUTPATIENT
Start: 2024-04-19 | End: 2024-04-20 | Stop reason: HOSPADM

## 2024-04-19 RX ORDER — LINEZOLID 2 MG/ML
600 INJECTION, SOLUTION INTRAVENOUS ONCE
Status: COMPLETED | OUTPATIENT
Start: 2024-04-19 | End: 2024-04-20

## 2024-04-19 RX ADMIN — LINEZOLID 600 MG: 600 INJECTION, SOLUTION INTRAVENOUS at 22:34

## 2024-04-19 RX ADMIN — SODIUM CHLORIDE 2000 MG: 9 INJECTION, SOLUTION INTRAVENOUS at 20:03

## 2024-04-19 RX ADMIN — DILTIAZEM HYDROCHLORIDE 10 MG: 5 INJECTION INTRAVENOUS at 21:35

## 2024-04-19 RX ADMIN — SODIUM CHLORIDE 2091 ML: 9 INJECTION, SOLUTION INTRAVENOUS at 20:14

## 2024-04-20 ENCOUNTER — APPOINTMENT (OUTPATIENT)
Dept: GENERAL RADIOLOGY | Facility: HOSPITAL | Age: 84
DRG: 871 | End: 2024-04-20
Payer: MEDICARE

## 2024-04-20 ENCOUNTER — HOSPITAL ENCOUNTER (INPATIENT)
Facility: HOSPITAL | Age: 84
LOS: 1 days | DRG: 871 | End: 2024-04-20
Attending: INTERNAL MEDICINE | Admitting: INTERNAL MEDICINE
Payer: MEDICARE

## 2024-04-20 VITALS
BODY MASS INDEX: 26.57 KG/M2 | WEIGHT: 165.34 LBS | OXYGEN SATURATION: 93 % | HEART RATE: 113 BPM | TEMPERATURE: 98.1 F | DIASTOLIC BLOOD PRESSURE: 49 MMHG | SYSTOLIC BLOOD PRESSURE: 99 MMHG | RESPIRATION RATE: 24 BRPM | HEIGHT: 66 IN

## 2024-04-20 VITALS
BODY MASS INDEX: 30.22 KG/M2 | HEART RATE: 82 BPM | SYSTOLIC BLOOD PRESSURE: 104 MMHG | RESPIRATION RATE: 40 BRPM | WEIGHT: 188.05 LBS | HEIGHT: 66 IN | DIASTOLIC BLOOD PRESSURE: 88 MMHG | OXYGEN SATURATION: 94 % | TEMPERATURE: 97.5 F

## 2024-04-20 PROBLEM — A41.9 SEPSIS: Status: ACTIVE | Noted: 2024-04-20

## 2024-04-20 PROBLEM — R65.21 SEPTIC SHOCK: Status: ACTIVE | Noted: 2017-10-18

## 2024-04-20 PROBLEM — N11.1 OBSTRUCTIVE PYELONEPHRITIS: Status: ACTIVE | Noted: 2024-04-20

## 2024-04-20 PROBLEM — N12 PYELONEPHRITIS: Status: ACTIVE | Noted: 2024-01-01

## 2024-04-20 LAB
A-A DO2: 84.1 MMHG (ref 0–300)
ARTERIAL PATENCY WRIST A: ABNORMAL
ATMOSPHERIC PRESS: 729 MMHG
BACTERIA BLD CULT: ABNORMAL
BACTERIA ID TEST ISLT QL CULT: ABNORMAL
BACTERIA UR QL AUTO: ABNORMAL /HPF
BASE EXCESS BLDA CALC-SCNC: -16.8 MMOL/L (ref 0–2)
BDY SITE: ABNORMAL
BILIRUB UR QL STRIP: ABNORMAL
BLOOD CULTURE ID, PCR 3: ABNORMAL
BOTTLE TYPE: ABNORMAL
CLARITY UR: ABNORMAL
CO2 BLDA-SCNC: 9.3 MMOL/L (ref 22–33)
COHGB MFR BLD: 0.9 % (ref 0–5)
COLOR UR: ABNORMAL
D-LACTATE SERPL-SCNC: 4.5 MMOL/L (ref 0.5–2)
D-LACTATE SERPL-SCNC: 5.6 MMOL/L (ref 0.5–2)
GAS FLOW AIRWAY: 2 LPM
GLUCOSE BLDC GLUCOMTR-MCNC: 219 MG/DL (ref 70–130)
GLUCOSE UR STRIP-MCNC: ABNORMAL MG/DL
HCO3 BLDA-SCNC: 8.7 MMOL/L (ref 20–26)
HCT VFR BLD CALC: 41.4 % (ref 38–51)
HGB BLDA-MCNC: 13.5 G/DL (ref 13.5–17.5)
HGB UR QL STRIP.AUTO: ABNORMAL
HYALINE CASTS UR QL AUTO: ABNORMAL /LPF
INHALED O2 CONCENTRATION: 28 %
KETONES UR QL STRIP: ABNORMAL
LEUKOCYTE ESTERASE UR QL STRIP.AUTO: ABNORMAL
Lab: ABNORMAL
Lab: ABNORMAL
METHGB BLD QL: 0.3 % (ref 0–3)
MODALITY: ABNORMAL
MRSA DNA SPEC QL NAA+PROBE: NEGATIVE
NITRITE UR QL STRIP: POSITIVE
NOTIFIED BY: ABNORMAL
NOTIFIED WHO: ABNORMAL
OXYHGB MFR BLDV: 93.1 % (ref 94–99)
PCO2 BLDA: 20.8 MM HG (ref 35–45)
PCO2 TEMP ADJ BLD: ABNORMAL MM[HG]
PH BLDA: 7.23 PH UNITS (ref 7.35–7.45)
PH UR STRIP.AUTO: 7.5 [PH] (ref 5–8)
PH, TEMP CORRECTED: ABNORMAL
PO2 BLDA: 83.6 MM HG (ref 83–108)
PO2 TEMP ADJ BLD: ABNORMAL MM[HG]
PROT UR QL STRIP: ABNORMAL
QT INTERVAL: 360 MS
QTC INTERVAL: 428 MS
RBC # UR STRIP: ABNORMAL /HPF
REF LAB TEST METHOD: ABNORMAL
SAO2 % BLDCOA: 94.2 % (ref 94–99)
SP GR UR STRIP: 1.02 (ref 1–1.03)
SQUAMOUS #/AREA URNS HPF: ABNORMAL /HPF
UROBILINOGEN UR QL STRIP: ABNORMAL
VENTILATOR MODE: ABNORMAL
WBC # UR STRIP: ABNORMAL /HPF

## 2024-04-20 PROCEDURE — 25010000002 ALBUMIN HUMAN 25% PER 50 ML: Performed by: NURSE PRACTITIONER

## 2024-04-20 PROCEDURE — 83050 HGB METHEMOGLOBIN QUAN: CPT

## 2024-04-20 PROCEDURE — 71045 X-RAY EXAM CHEST 1 VIEW: CPT

## 2024-04-20 PROCEDURE — 87641 MR-STAPH DNA AMP PROBE: CPT | Performed by: NURSE PRACTITIONER

## 2024-04-20 PROCEDURE — 71045 X-RAY EXAM CHEST 1 VIEW: CPT | Performed by: RADIOLOGY

## 2024-04-20 PROCEDURE — 96368 THER/DIAG CONCURRENT INF: CPT

## 2024-04-20 PROCEDURE — 63710000001 INSULIN REGULAR HUMAN PER 5 UNITS: Performed by: NURSE PRACTITIONER

## 2024-04-20 PROCEDURE — 25010000002 MIDAZOLAM PER 1 MG: Performed by: EMERGENCY MEDICINE

## 2024-04-20 PROCEDURE — 93010 ELECTROCARDIOGRAM REPORT: CPT | Performed by: INTERNAL MEDICINE

## 2024-04-20 PROCEDURE — 25010000002 GLYCOPYRROLATE 0.2 MG/ML SOLUTION: Performed by: INTERNAL MEDICINE

## 2024-04-20 PROCEDURE — 87449 NOS EACH ORGANISM AG IA: CPT | Performed by: NURSE PRACTITIONER

## 2024-04-20 PROCEDURE — 25010000002 VASOPRESSIN 0.2 UNIT/ML SOLUTION

## 2024-04-20 PROCEDURE — 81001 URINALYSIS AUTO W/SCOPE: CPT | Performed by: NURSE PRACTITIONER

## 2024-04-20 PROCEDURE — 96367 TX/PROPH/DG ADDL SEQ IV INF: CPT

## 2024-04-20 PROCEDURE — 83605 ASSAY OF LACTIC ACID: CPT | Performed by: PHYSICIAN ASSISTANT

## 2024-04-20 PROCEDURE — 25810000003 SODIUM CHLORIDE 0.9 % SOLUTION: Performed by: EMERGENCY MEDICINE

## 2024-04-20 PROCEDURE — 36600 WITHDRAWAL OF ARTERIAL BLOOD: CPT

## 2024-04-20 PROCEDURE — 99223 1ST HOSP IP/OBS HIGH 75: CPT | Performed by: UROLOGY

## 2024-04-20 PROCEDURE — 25010000002 MIDAZOLAM PER 1 MG: Performed by: INTERNAL MEDICINE

## 2024-04-20 PROCEDURE — 82948 REAGENT STRIP/BLOOD GLUCOSE: CPT

## 2024-04-20 PROCEDURE — 25010000002 VASOPRESSIN 0.2 UNIT/ML SOLUTION: Performed by: NURSE PRACTITIONER

## 2024-04-20 PROCEDURE — 82805 BLOOD GASES W/O2 SATURATION: CPT

## 2024-04-20 PROCEDURE — 87102 FUNGUS ISOLATION CULTURE: CPT

## 2024-04-20 PROCEDURE — 25010000002 VASOPRESSIN 0.2 UNIT/ML SOLUTION: Performed by: EMERGENCY MEDICINE

## 2024-04-20 PROCEDURE — 87086 URINE CULTURE/COLONY COUNT: CPT | Performed by: NURSE PRACTITIONER

## 2024-04-20 PROCEDURE — 99238 HOSP IP/OBS DSCHRG MGMT 30/<: CPT

## 2024-04-20 PROCEDURE — 87899 AGENT NOS ASSAY W/OPTIC: CPT | Performed by: NURSE PRACTITIONER

## 2024-04-20 PROCEDURE — 87186 SC STD MICRODIL/AGAR DIL: CPT | Performed by: NURSE PRACTITIONER

## 2024-04-20 PROCEDURE — 93005 ELECTROCARDIOGRAM TRACING: CPT | Performed by: NURSE PRACTITIONER

## 2024-04-20 PROCEDURE — 25810000003 LACTATED RINGERS PER 1000 ML: Performed by: NURSE PRACTITIONER

## 2024-04-20 PROCEDURE — 87077 CULTURE AEROBIC IDENTIFY: CPT | Performed by: NURSE PRACTITIONER

## 2024-04-20 PROCEDURE — 99291 CRITICAL CARE FIRST HOUR: CPT | Performed by: INTERNAL MEDICINE

## 2024-04-20 PROCEDURE — P9047 ALBUMIN (HUMAN), 25%, 50ML: HCPCS | Performed by: NURSE PRACTITIONER

## 2024-04-20 PROCEDURE — 25010000002 PIPERACILLIN SOD-TAZOBACTAM PER 1 G: Performed by: EMERGENCY MEDICINE

## 2024-04-20 PROCEDURE — 82375 ASSAY CARBOXYHB QUANT: CPT

## 2024-04-20 PROCEDURE — 25010000002 MORPHINE PER 10 MG: Performed by: INTERNAL MEDICINE

## 2024-04-20 PROCEDURE — 96375 TX/PRO/DX INJ NEW DRUG ADDON: CPT

## 2024-04-20 PROCEDURE — 96366 THER/PROPH/DIAG IV INF ADDON: CPT

## 2024-04-20 RX ORDER — BISACODYL 10 MG
10 SUPPOSITORY, RECTAL RECTAL DAILY PRN
Status: DISCONTINUED | OUTPATIENT
Start: 2024-04-20 | End: 2024-04-20 | Stop reason: HOSPADM

## 2024-04-20 RX ORDER — LEVOTHYROXINE SODIUM 0.05 MG/1
50 TABLET ORAL
Status: DISCONTINUED | OUTPATIENT
Start: 2024-04-20 | End: 2024-04-20

## 2024-04-20 RX ORDER — AMOXICILLIN 250 MG
2 CAPSULE ORAL 2 TIMES DAILY
Status: DISCONTINUED | OUTPATIENT
Start: 2024-04-20 | End: 2024-04-20

## 2024-04-20 RX ORDER — NICOTINE POLACRILEX 4 MG
15 LOZENGE BUCCAL
Status: DISCONTINUED | OUTPATIENT
Start: 2024-04-20 | End: 2024-04-20

## 2024-04-20 RX ORDER — SODIUM CHLORIDE 9 MG/ML
125 INJECTION, SOLUTION INTRAVENOUS CONTINUOUS
Status: DISCONTINUED | OUTPATIENT
Start: 2024-04-20 | End: 2024-04-20 | Stop reason: HOSPADM

## 2024-04-20 RX ORDER — GLYCOPYRROLATE 0.2 MG/ML
0.2 INJECTION INTRAMUSCULAR; INTRAVENOUS EVERY 4 HOURS
Status: DISCONTINUED | OUTPATIENT
Start: 2024-04-20 | End: 2024-04-20 | Stop reason: HOSPADM

## 2024-04-20 RX ORDER — NOREPINEPHRINE BITARTRATE 0.03 MG/ML
.02-.5 INJECTION, SOLUTION INTRAVENOUS
Status: DISCONTINUED | OUTPATIENT
Start: 2024-04-20 | End: 2024-04-20

## 2024-04-20 RX ORDER — MAGNESIUM CARB/ALUMINUM HYDROX 105-160MG
30 TABLET,CHEWABLE ORAL 4 TIMES DAILY PRN
Status: DISCONTINUED | OUTPATIENT
Start: 2024-04-20 | End: 2024-04-20 | Stop reason: HOSPADM

## 2024-04-20 RX ORDER — SODIUM CHLORIDE 9 MG/ML
40 INJECTION, SOLUTION INTRAVENOUS AS NEEDED
Status: DISCONTINUED | OUTPATIENT
Start: 2024-04-20 | End: 2024-04-20 | Stop reason: HOSPADM

## 2024-04-20 RX ORDER — IBUPROFEN 600 MG/1
1 TABLET ORAL
Status: DISCONTINUED | OUTPATIENT
Start: 2024-04-20 | End: 2024-04-20

## 2024-04-20 RX ORDER — POLYETHYLENE GLYCOL 3350 17 G/17G
17 POWDER, FOR SOLUTION ORAL DAILY PRN
Status: DISCONTINUED | OUTPATIENT
Start: 2024-04-20 | End: 2024-04-20

## 2024-04-20 RX ORDER — DEXTROSE MONOHYDRATE 25 G/50ML
25 INJECTION, SOLUTION INTRAVENOUS
Status: DISCONTINUED | OUTPATIENT
Start: 2024-04-20 | End: 2024-04-20

## 2024-04-20 RX ORDER — SODIUM CHLORIDE 0.9 % (FLUSH) 0.9 %
10 SYRINGE (ML) INJECTION EVERY 12 HOURS SCHEDULED
Status: DISCONTINUED | OUTPATIENT
Start: 2024-04-20 | End: 2024-04-20 | Stop reason: HOSPADM

## 2024-04-20 RX ORDER — FLUORIDE TOOTHPASTE
1 TOOTHPASTE DENTAL 4 TIMES DAILY PRN
Status: DISCONTINUED | OUTPATIENT
Start: 2024-04-20 | End: 2024-04-20 | Stop reason: HOSPADM

## 2024-04-20 RX ORDER — MIDAZOLAM HYDROCHLORIDE 1 MG/ML
0.5 INJECTION INTRAMUSCULAR; INTRAVENOUS
Status: DISCONTINUED | OUTPATIENT
Start: 2024-04-20 | End: 2024-04-20 | Stop reason: HOSPADM

## 2024-04-20 RX ORDER — MEMANTINE HYDROCHLORIDE 28 MG/1
28 CAPSULE, EXTENDED RELEASE ORAL DAILY
COMMUNITY

## 2024-04-20 RX ORDER — NOREPINEPHRINE BITARTRATE 0.03 MG/ML
.02-.3 INJECTION, SOLUTION INTRAVENOUS
Status: DISCONTINUED | OUTPATIENT
Start: 2024-04-20 | End: 2024-04-20 | Stop reason: HOSPADM

## 2024-04-20 RX ORDER — DONEPEZIL HYDROCHLORIDE 10 MG/1
10 TABLET, FILM COATED ORAL NIGHTLY
COMMUNITY

## 2024-04-20 RX ORDER — MIDAZOLAM HYDROCHLORIDE 1 MG/ML
2 INJECTION INTRAMUSCULAR; INTRAVENOUS ONCE
Status: COMPLETED | OUTPATIENT
Start: 2024-04-20 | End: 2024-04-20

## 2024-04-20 RX ORDER — SODIUM CHLORIDE, SODIUM LACTATE, POTASSIUM CHLORIDE, CALCIUM CHLORIDE 600; 310; 30; 20 MG/100ML; MG/100ML; MG/100ML; MG/100ML
75 INJECTION, SOLUTION INTRAVENOUS CONTINUOUS
Status: DISCONTINUED | OUTPATIENT
Start: 2024-04-20 | End: 2024-04-20

## 2024-04-20 RX ORDER — SODIUM CHLORIDE 0.9 % (FLUSH) 0.9 %
10 SYRINGE (ML) INJECTION AS NEEDED
Status: DISCONTINUED | OUTPATIENT
Start: 2024-04-20 | End: 2024-04-20 | Stop reason: HOSPADM

## 2024-04-20 RX ORDER — NITROGLYCERIN 0.4 MG/1
0.4 TABLET SUBLINGUAL
Status: DISCONTINUED | OUTPATIENT
Start: 2024-04-20 | End: 2024-04-20 | Stop reason: HOSPADM

## 2024-04-20 RX ORDER — MORPHINE SULFATE 2 MG/ML
1 INJECTION, SOLUTION INTRAMUSCULAR; INTRAVENOUS
Status: DISCONTINUED | OUTPATIENT
Start: 2024-04-20 | End: 2024-04-20 | Stop reason: HOSPADM

## 2024-04-20 RX ORDER — ALBUMIN (HUMAN) 12.5 G/50ML
50 SOLUTION INTRAVENOUS ONCE
Status: COMPLETED | OUTPATIENT
Start: 2024-04-20 | End: 2024-04-20

## 2024-04-20 RX ORDER — ACETAMINOPHEN 160 MG
TABLET,DISINTEGRATING ORAL 4 TIMES DAILY PRN
Status: DISCONTINUED | OUTPATIENT
Start: 2024-04-20 | End: 2024-04-20 | Stop reason: HOSPADM

## 2024-04-20 RX ORDER — VANCOMYCIN/0.9 % SOD CHLORIDE 1.5G/250ML
1500 PLASTIC BAG, INJECTION (ML) INTRAVENOUS ONCE
Status: DISCONTINUED | OUTPATIENT
Start: 2024-04-20 | End: 2024-04-20

## 2024-04-20 RX ORDER — BISACODYL 5 MG/1
5 TABLET, DELAYED RELEASE ORAL DAILY PRN
Status: DISCONTINUED | OUTPATIENT
Start: 2024-04-20 | End: 2024-04-20

## 2024-04-20 RX ADMIN — VASOPRESSIN 0.03 UNITS/MIN: 0.2 INJECTION INTRAVENOUS at 09:52

## 2024-04-20 RX ADMIN — GLYCOPYRROLATE 0.2 MG: 0.2 INJECTION INTRAMUSCULAR; INTRAVENOUS at 14:24

## 2024-04-20 RX ADMIN — MORPHINE SULFATE 1 MG: 2 INJECTION, SOLUTION INTRAMUSCULAR; INTRAVENOUS at 14:33

## 2024-04-20 RX ADMIN — INSULIN HUMAN 5 UNITS: 100 INJECTION, SOLUTION PARENTERAL at 08:36

## 2024-04-20 RX ADMIN — SODIUM CHLORIDE, POTASSIUM CHLORIDE, SODIUM LACTATE AND CALCIUM CHLORIDE 75 ML/HR: 600; 310; 30; 20 INJECTION, SOLUTION INTRAVENOUS at 08:36

## 2024-04-20 RX ADMIN — NOREPINEPHRINE BITARTRATE 0.3 MCG/KG/MIN: 0.03 INJECTION, SOLUTION INTRAVENOUS at 09:52

## 2024-04-20 RX ADMIN — ALBUMIN (HUMAN) 50 G: 0.25 INJECTION, SOLUTION INTRAVENOUS at 08:36

## 2024-04-20 RX ADMIN — NOREPINEPHRINE BITARTRATE 0.3 MCG/KG/MIN: 0.03 INJECTION, SOLUTION INTRAVENOUS at 06:03

## 2024-04-20 RX ADMIN — VASOPRESSIN 0.03 UNITS/MIN: 0.2 INJECTION INTRAVENOUS at 04:59

## 2024-04-20 RX ADMIN — VASOPRESSIN 0.03 UNITS/MIN: 0.2 INJECTION INTRAVENOUS at 07:55

## 2024-04-20 RX ADMIN — NOREPINEPHRINE BITARTRATE 0.02 MCG/KG/MIN: 0.03 INJECTION, SOLUTION INTRAVENOUS at 00:48

## 2024-04-20 RX ADMIN — MIDAZOLAM HYDROCHLORIDE 0.5 MG: 1 INJECTION, SOLUTION INTRAMUSCULAR; INTRAVENOUS at 14:57

## 2024-04-20 RX ADMIN — SODIUM CHLORIDE 125 ML/HR: 9 INJECTION, SOLUTION INTRAVENOUS at 05:00

## 2024-04-20 RX ADMIN — MIDAZOLAM HYDROCHLORIDE 2 MG: 1 INJECTION, SOLUTION INTRAMUSCULAR; INTRAVENOUS at 02:58

## 2024-04-20 RX ADMIN — NOREPINEPHRINE BITARTRATE 0.3 MCG/KG/MIN: 0.03 INJECTION, SOLUTION INTRAVENOUS at 07:54

## 2024-04-20 RX ADMIN — PIPERACILLIN SODIUM AND TAZOBACTAM SODIUM 4.5 G: 4; .5 INJECTION, POWDER, LYOPHILIZED, FOR SOLUTION INTRAVENOUS at 05:01

## 2024-04-20 NOTE — PROGRESS NOTES
"Pharmacy Consult-Vancomycin Dosing  Luz Beasley is a  83 y.o. female receiving Zosyn and Vancomycin therapy for septic shock  Consulting Provider: Intensivist    Goal Trough: 15-20    Current Antimicrobial Therapy  Zosyn (4/20 - 4/26) 7  Vanco (4/20 - 4/24) 5    Allergies  Allergies as of 04/20/2024    (No Known Allergies)     Labs  Results from last 7 days   Lab Units 04/19/24 1956   BUN mg/dL 183*   CREATININE mg/dL 7.87*     Results from last 7 days   Lab Units 04/19/24 1938   WBC 10*3/mm3 21.88*     Evaluation of Dosing  Ht - 167.6 cm (65.98\")  Wt - 75 kg (165 lb 5.5 oz)    Estimated Creatinine Clearance: 5.6 mL/min (A) (by C-G formula based on SCr of 7.87 mg/dL (H)).  Intake & Output (last 3 days)       None          Microbiology and Radiology  Microbiology Results (last 10 days)       Procedure Component Value - Date/Time    COVID-19 and FLU A/B PCR, 1 HR TAT - Swab, Nasopharynx [[id number]]  (Normal) Collected: 04/19/24 1938    Lab Status: Final result Specimen: Swab from Nasopharynx Updated: 04/19/24 2002     COVID19 Not Detected     Influenza A PCR Not Detected     Influenza B PCR Not Detected    Narrative:      Fact sheet for providers: https://www.fda.gov/media/659813/download    Fact sheet for patients: https://www.fda.gov/media/155009/download    Test performed by PCR.          Vancomycin Levels:        Assessment/Plan:  Initiate patient on empiric antibiotics, Zosyn and Vancomycin.  Dose adjust antibiotics for CrCl <10 ml/min  Zosyn 4.5 gm IV q12 hrs, will give Vancomycin load of 20mg/kg x 1 dose. Will give additional doses of Vancomycin based on levels.  Random Vancomycin level with tomorrow AM labs.    Thanks,   Shahida Pelletier PharmD BCPS    "

## 2024-04-20 NOTE — ED NOTES
Dr. Proctor on phone with granddaughter/guardian Sophia who gives verbal consent to obtain central line access. Pt current status explained to granddaughter who states that she wants the pt to receive all medications/interventions but she does not want the pt to receive CPR/intubation if deemed necessary by provider

## 2024-04-20 NOTE — ED PROVIDER NOTES
Subjective   History of Present Illness  83-year-old female patient presents to the emergency room by EMS for altered mental status.Patient is from nursing home.  Nursing home called for EMS to bring her to the emergency room because she was not acting herself.  At baseline she is confused and screams constantly.  Today she has not been screaming as she normally would.  She has a history of dementia.    History provided by:  Patient   used: No        Review of Systems   Unable to perform ROS: Mental status change       Past Medical History:   Diagnosis Date   • Anxiety    • Cluster headache    • Dementia    • Depression    • Diabetes mellitus    • Disease of thyroid gland    • Hx of sepsis    • Hyperlipidemia    • Hyperparathyroidism    • Hypertension    • Muscle weakness    • OA (osteoarthritis)    • Obesity    • Senile dementia    • UTI (urinary tract infection)    • Vitamin D deficiency    • Vitamin D deficiency disease        No Known Allergies    Past Surgical History:   Procedure Laterality Date   • CARDIAC CATHETERIZATION  05/05/2006   • CARDIOVASCULAR STRESS TEST  05/05/2006   • COLONOSCOPY  03/21/2008   • ENDOSCOPY W/ PEG TUBE PLACEMENT N/A 1/5/2023    Procedure: ESOPHAGOGASTRODUODENOSCOPY WITH PERCUTANEOUS ENDOSCOPIC GASTROSTOMY TUBE INSERTION;  Surgeon: Austyn Lr MD;  Location: [facility];  Service: Gastroenterology;  Laterality: N/A;   • ENDOSCOPY W/ PEG TUBE PLACEMENT N/A 3/21/2023    Procedure: ESOPHAGOGASTRODUODENOSCOPY WITH PERCUTANEOUS ENDOSCOPIC GASTROSTOMY TUBE INSERTION;  Surgeon: Nima Jarvis MD;  Location: [facility];  Service: General;  Laterality: N/A;   • ENDOSCOPY W/ PEG TUBE PLACEMENT N/A 7/5/2023    Procedure: ESOPHAGOGASTRODUODENOSCOPY WITH PERCUTANEOUS ENDOSCOPIC GASTROSTOMY TUBE INSERTION WITH ANESTHESIA;  Surgeon: Nima Jarvis MD;  Location: [facility];  Service: Gastroenterology;  Laterality: N/A;   • ENDOSCOPY W/ PEG TUBE PLACEMENT N/A 1/26/2024     Procedure: ESOPHAGOGASTRODUODENOSCOPY WITH PERCUTANEOUS ENDOSCOPIC GASTROSTOMY TUBE INSERTION WITH ANESTHESIA;  Surgeon: Nima Jarvis MD;  Location: [facility];  Service: Gastroenterology;  Laterality: N/A;   • EXTRACORPOREAL SHOCK WAVE LITHOTRIPSY (ESWL) Right 1/28/2022    Procedure: EXTRACORPOREAL SHOCKWAVE LITHOTRIPSY;  Surgeon: Yusef Willson MD;  Location: [facility] OR;  Service: Urology;  Laterality: Right;   • HYSTERECTOMY     • THYROID SURGERY         Family History   Family history unknown: Yes       Social History     Socioeconomic History   • Marital status:    Tobacco Use   • Smoking status: Never   • Smokeless tobacco: Never   Vaping Use   • Vaping status: Never Used   Substance and Sexual Activity   • Alcohol use: No   • Drug use: No   • Sexual activity: Defer           Objective   Physical Exam  Vitals and nursing note reviewed.   Constitutional:       Appearance: She is well-developed and normal weight.   HENT:      Head: Normocephalic and atraumatic.      Mouth/Throat:      Mouth: Mucous membranes are moist.      Pharynx: Oropharynx is clear.   Eyes:      Extraocular Movements: Extraocular movements intact.      Pupils: Pupils are equal, round, and reactive to light.   Cardiovascular:      Rate and Rhythm: Normal rate and regular rhythm.      Heart sounds: Normal heart sounds.   Pulmonary:      Effort: Pulmonary effort is normal.      Breath sounds: Normal breath sounds.   Abdominal:      General: Bowel sounds are normal.      Palpations: Abdomen is soft.   Musculoskeletal:         General: Normal range of motion.      Cervical back: Normal range of motion and neck supple.   Skin:     General: Skin is warm and dry.      Capillary Refill: Capillary refill takes less than 2 seconds.   Neurological:      Mental Status: She is alert. She is confused.   Psychiatric:         Mood and Affect: Mood normal.         Speech: Speech normal.         Behavior: Behavior normal.          Procedures           ED Course  ED Course as of 04/19/24 2223   Fri Apr 19, 2024 2002 CT Head Without Contrast [ML]   2002 XR Chest 1 View [ML]   2005 Lactate(!!): 2.2 [ML]   2110 proBNP(!): 28,943.0 [ML]   2111 WBC(!): 21.88 [ML]   2112 C-Reactive Protein(!): 46.10 [ML]   2112 HS Troponin T(!!): 195 [ML]   2112 proBNP(!): 28,943.0  Fluids stopped.  RN reports she received 400cc  [ML]   2112 Creatinine(!): 7.87 [ML]   2126 Platelets(!!): 39 [ML]   2126 WBC(!): 21.88 [ML]   2139 Discussed with Dr. Wright.  Fluids restarted.  He will take over the patient.   [ML]      ED Course User Index  [ML] Marleen Rios PA                                             Medical Decision Making  Amount and/or Complexity of Data Reviewed  Labs: ordered. Decision-making details documented in ED Course.  Radiology: ordered. Decision-making details documented in ED Course.  ECG/medicine tests: ordered.    Risk  Prescription drug management.        Final diagnoses:   None       ED Disposition  ED Disposition       ED Disposition   Intended Admit    Condition   --    Comment   --               No follow-up provider specified.       Medication List      No changes were made to your prescriptions during this visit.          Right nephrolithiasis.  2.  Partial right renal obstruction due to a 7.8 mm stone in the  proximal right ureter.  3.  Mild chronic bilateral renal cortical volume loss  4.  Small cyst at the lower pole of the left kidney.  5.  Enlarged heart size  6.  Mild atelectasis at the lung bases.  7.  Hysterectomy.  8.  Degenerative disc disease in the lumbar spine with mild scoliosis.  9.  No bowel or left renal obstruction.  10.  No features of acute appendicitis.  11.  No free fluid or free air.   12.  No abscess or hematoma.     This report was finalized on 4/19/2024 10:58 PM by Sameer Lanier MD.           Specimen Collected: 04/19/24 22:52 EDT Last Resulted: 04/19/24 22:58 EDT         [ML]   1103 CT Chest Without Contrast Diagnostic     IMPRESSION:     1.  Enlarged heart size  2.  Mild atelectasis at the lung bases.  3.  No interstitial edema or pleural effusion.  4.  No thoracic aortic aneurysm.  5.  Mild osteoarthritis at the glenohumeral joints.  6.  No mediastinal or hilar adenopathy.     This report was finalized on 4/19/2024 10:52 PM by Sameer Lanier MD.           Specimen Collected: 04/19/24 22:50 EDT Last Resulted: 04/19/24 22:52 EDT         [ML]   1103 CT Head Without Contrast  IMPRESSION:  No acute intracranial process.        This report was finalized on 4/19/2024 7:56 PM by Alex Pallas, DO.           Specimen Collected: 04/19/24 19:55 EDT Last Resulted: 04/19/24 19:56 EDT            [ML]      ED Course User Index  [ES] Derick Wright MD  [ML] Marleen Rios PA                                   Results for orders placed or performed during the hospital encounter of 04/19/24   COVID-19 and FLU A/B PCR, 1 HR TAT - Swab, Nasopharynx    Specimen: Nasopharynx; Swab   Result Value Ref Range    COVID19 Not Detected Not Detected - Ref. Range    Influenza A PCR Not Detected Not Detected    Influenza B PCR Not Detected Not Detected   Blood Culture - Blood, Arm, Right    Specimen: Arm, Right; Blood    Result Value Ref Range    Blood Culture Escherichia coli (C)     Isolated from Aerobic and Anaerobic Bottles     Blood Culture Pseudomonas aeruginosa (C)     Isolated from Aerobic and Anaerobic Bottles     Blood Culture Enterococcus faecalis (C)     Isolated from Aerobic and Anaerobic Bottles     Gram Stain Anaerobic Bottle Gram negative bacilli (C)     Gram Stain (C)      Anaerobic Bottle Gram positive cocci in pairs and chains    Gram Stain Aerobic Bottle Gram negative bacilli (C)     Gram Stain (C)      Aerobic Bottle Gram positive cocci in pairs and chains       Susceptibility    Enterococcus faecalis - JEET     Ampicillin  Susceptible ug/ml     Gentamicin High Level Synergy  Susceptible ug/ml     Vancomycin  Susceptible ug/ml    Escherichia coli - JEET*     Amoxicillin + Clavulanate  Susceptible ug/ml     Ampicillin  Intermediate ug/ml     Ampicillin + Sulbactam  Susceptible ug/ml     Cefepime  Susceptible ug/ml     Ceftazidime  Susceptible ug/ml     Ceftriaxone  Susceptible ug/ml     Gentamicin  Susceptible ug/ml     Levofloxacin  Resistant ug/ml     Piperacillin + Tazobactam  Susceptible ug/ml     Trimethoprim + Sulfamethoxazole  Susceptible ug/ml     * Cefazolin sensitivity will not be reported for Enterobacteriaceae in non-urine isolates. If cefazolin is preferred, please call the microbiology lab to request an E-test.  With the exception of urinary-sourced infections, aminoglycosides should not be used as monotherapy.    Pseudomonas aeruginosa - JEET*     Cefepime  Susceptible ug/ml     Ceftazidime  Susceptible ug/ml     Ciprofloxacin  Susceptible ug/ml     Levofloxacin  Susceptible ug/ml     Piperacillin + Tazobactam  Susceptible ug/ml     * With the exception of urinary-sourced infections, aminoglycosides should not be used as monotherapy.   Blood Culture - Blood, Arm, Left    Specimen: Arm, Left; Blood   Result Value Ref Range    Blood Culture Escherichia coli (C)     Isolated from Aerobic and Anaerobic  Bottles     Blood Culture Pseudomonas aeruginosa (C)     Isolated from Aerobic and Anaerobic Bottles     Blood Culture Enterococcus faecalis (C)     Isolated from Aerobic and Anaerobic Bottles     Blood Culture Staphylococcus, coagulase negative (C)     Isolated from Aerobic and Anaerobic Bottles     Gram Stain Anaerobic Bottle Gram negative bacilli (C)     Gram Stain (C)      Anaerobic Bottle Gram positive cocci in pairs and chains    Gram Stain Aerobic Bottle Gram negative bacilli (C)     Gram Stain (C)      Aerobic Bottle Gram positive cocci in pairs and chains   Urine Culture - Urine, Straight Cath    Specimen: Straight Cath; Urine   Result Value Ref Range    Urine Culture >100,000 CFU/mL Mixed Alejandra Isolated    CANDIDA AURIS SCREEN - Swab, Axilla Right, Axilla Left and Groin    Specimen: Axilla Right, Axilla Left and Groin; Swab   Result Value Ref Range    Candida Auris Screen Culture No Candida auris isolated at 5 days    Blood Culture ID, PCR - Blood, Arm, Right    Specimen: Arm, Right; Blood   Result Value Ref Range    BCID, PCR (A) Negative by BCID PCR. Culture to Follow.     Enterococcus faecalis. Brianna/B (vancomycin resistance gene) not detected. Identification by BCID2 PCR.    BCID, PCR 2 Escherichia coli. Identification by BCID2 PCR. (A) Negative by BCID PCR. Culture to Follow.    BCID, PCR 3 (A) Negative by BCID PCR. Culture to Follow.     Pseudomonas aeruginosa. Identification by BCID2 PCR    BOTTLE TYPE Aerobic Bottle    Urinalysis With Microscopic If Indicated (No Culture) - Straight Cath    Specimen: Straight Cath; Urine   Result Value Ref Range    Color, UA Dark Yellow (A) Yellow, Straw    Appearance, UA Turbid (A) Clear    pH, UA 6.5 5.0 - 8.0    Specific Gravity, UA 1.023 1.005 - 1.030    Glucose, UA Negative Negative    Ketones, UA Trace (A) Negative    Bilirubin, UA Moderate (2+) (A) Negative    Blood, UA Large (3+) (A) Negative    Protein, UA >=300 mg/dL (3+) (A) Negative    Leuk Esterase, UA  Large (3+) (A) Negative    Nitrite, UA Positive (A) Negative    Urobilinogen, UA 1.0 E.U./dL 0.2 - 1.0 E.U./dL   Comprehensive Metabolic Panel    Specimen: Blood   Result Value Ref Range    Glucose 322 (H) 65 - 99 mg/dL     (H) 8 - 23 mg/dL    Creatinine 7.87 (H) 0.57 - 1.00 mg/dL    Sodium 156 (H) 136 - 145 mmol/L    Potassium 4.5 3.5 - 5.2 mmol/L    Chloride 114 (H) 98 - 107 mmol/L    CO2 17.5 (L) 22.0 - 29.0 mmol/L    Calcium 9.4 8.6 - 10.5 mg/dL    Total Protein 7.1 6.0 - 8.5 g/dL    Albumin 2.5 (L) 3.5 - 5.2 g/dL    ALT (SGPT) 36 (H) 1 - 33 U/L    AST (SGOT) 71 (H) 1 - 32 U/L    Alkaline Phosphatase 544 (H) 39 - 117 U/L    Total Bilirubin 1.6 (H) 0.0 - 1.2 mg/dL    Globulin 4.6 gm/dL    A/G Ratio 0.5 g/dL    BUN/Creatinine Ratio 23.3 7.0 - 25.0    Anion Gap 24.5 (H) 5.0 - 15.0 mmol/L    eGFR 4.7 (L) >60.0 mL/min/1.73   High Sensitivity Troponin T    Specimen: Blood   Result Value Ref Range    HS Troponin T 195 (C) <14 ng/L   BNP    Specimen: Blood   Result Value Ref Range    proBNP 28,943.0 (H) 0.0 - 1,800.0 pg/mL   C-reactive Protein    Specimen: Blood   Result Value Ref Range    C-Reactive Protein 46.10 (H) 0.00 - 0.50 mg/dL   CBC Auto Differential    Specimen: Blood   Result Value Ref Range    WBC 21.88 (H) 3.40 - 10.80 10*3/mm3    RBC 4.64 3.77 - 5.28 10*6/mm3    Hemoglobin 13.5 12.0 - 15.9 g/dL    Hematocrit 41.7 34.0 - 46.6 %    MCV 89.9 79.0 - 97.0 fL    MCH 29.1 26.6 - 33.0 pg    MCHC 32.4 31.5 - 35.7 g/dL    RDW 16.0 (H) 12.3 - 15.4 %    RDW-SD 52.6 37.0 - 54.0 fl    MPV      Platelets 39 (C) 140 - 450 10*3/mm3   Lactic Acid, Plasma    Specimen: Blood   Result Value Ref Range    Lactate 2.2 (C) 0.5 - 2.0 mmol/L   Scan Slide    Specimen: Blood   Result Value Ref Range    Scan Slide     Urinalysis, Microscopic Only - Straight Cath    Specimen: Straight Cath; Urine   Result Value Ref Range    RBC, UA 3-5 (A) None Seen, 0-2 /HPF    WBC, UA 0-2 None Seen, 0-2 /HPF    Bacteria, UA 4+ (A) None Seen  /HPF    Squamous Epithelial Cells, UA 0-2 None Seen, 0-2 /HPF    Hyaline Casts, UA None Seen None Seen /LPF    Methodology Automated Microscopy    Manual Differential    Specimen: Blood   Result Value Ref Range    Neutrophil % 80.0 (H) 42.7 - 76.0 %    Lymphocyte % 10.0 (L) 19.6 - 45.3 %    Monocyte % 1.0 (L) 5.0 - 12.0 %    Bands %  9.0 (H) 0.0 - 5.0 %    Neutrophils Absolute 19.47 (H) 1.70 - 7.00 10*3/mm3    Lymphocytes Absolute 2.19 0.70 - 3.10 10*3/mm3    Monocytes Absolute 0.22 0.10 - 0.90 10*3/mm3    nRBC 1.0 (H) 0.0 - 0.2 /100 WBC    Anisocytosis Slight/1+ None Seen    Toxic Granulation Slight/1+ None Seen    Vacuolated Neutrophils Slight/1+ None Seen    Platelet Estimate Decreased Normal   STAT Lactic Acid, Reflex    Specimen: Blood   Result Value Ref Range    Lactate 2.4 (C) 0.5 - 2.0 mmol/L   High Sensitivity Troponin T 2Hr    Specimen: Blood   Result Value Ref Range    HS Troponin T 174 (C) <14 ng/L    Troponin T Delta -21 (L) >=-4 - <+4 ng/L   Procalcitonin    Specimen: Blood   Result Value Ref Range    Procalcitonin >100.00 (H) 0.00 - 0.25 ng/mL   Sedimentation Rate    Specimen: Blood   Result Value Ref Range    Sed Rate 96 (H) 0 - 30 mm/hr   Magnesium    Specimen: Blood   Result Value Ref Range    Magnesium 3.4 (H) 1.6 - 2.4 mg/dL   TSH    Specimen: Blood   Result Value Ref Range    TSH 2.910 0.270 - 4.200 uIU/mL   T4, Free    Specimen: Blood   Result Value Ref Range    Free T4 0.48 (L) 0.93 - 1.70 ng/dL   Phosphorus    Specimen: Blood   Result Value Ref Range    Phosphorus 4.7 (H) 2.5 - 4.5 mg/dL   Blood Gas, Arterial With Co-Ox    Specimen: Arterial Blood   Result Value Ref Range    Site Left Radial     Abner's Test Positive     pH, Arterial 7.308 (L) 7.350 - 7.450 pH units    pCO2, Arterial 30.0 (L) 35.0 - 45.0 mm Hg    pO2, Arterial 76.7 (L) 83.0 - 108.0 mm Hg    HCO3, Arterial 15.0 (L) 20.0 - 26.0 mmol/L    Base Excess, Arterial -10.0 (L) 0.0 - 2.0 mmol/L    O2 Saturation, Arterial 94.2 94.0 -  99.0 %    Hemoglobin, Blood Gas 12.5 (L) 13.5 - 17.5 g/dL    Hematocrit, Blood Gas 38.2 38.0 - 51.0 %    Oxyhemoglobin 93.9 (L) 94 - 99 %    Methemoglobin <-0.10 (L) 0.00 - 3.00 %    Carboxyhemoglobin 1.2 0 - 5 %    A-a DO2 32.7 0.0 - 300.0 mmHg    CO2 Content 15.9 (L) 22 - 33 mmol/L    Barometric Pressure for Blood Gas 729 mmHg    Modality Room Air     FIO2 21 %    Ventilator Mode NA     Collected by [id number]     pH, Temp Corrected      pCO2, Temperature Corrected      pO2, Temperature Corrected     STAT Lactic Acid, Reflex    Specimen: Arm, Right; Blood   Result Value Ref Range    Lactate 4.5 (C) 0.5 - 2.0 mmol/L   STAT Lactic Acid, Reflex    Specimen: Blood   Result Value Ref Range    Lactate 5.6 (C) 0.5 - 2.0 mmol/L   Blood Gas, Arterial With Co-Ox    Specimen: Arterial Blood   Result Value Ref Range    Site Right Brachial     Abner's Test N/A     pH, Arterial 7.230 (L) 7.350 - 7.450 pH units    pCO2, Arterial 20.8 (L) 35.0 - 45.0 mm Hg    pO2, Arterial 83.6 83.0 - 108.0 mm Hg    HCO3, Arterial 8.7 (L) 20.0 - 26.0 mmol/L    Base Excess, Arterial -16.8 (L) 0.0 - 2.0 mmol/L    O2 Saturation, Arterial 94.2 94.0 - 99.0 %    Hemoglobin, Blood Gas 13.5 13.5 - 17.5 g/dL    Hematocrit, Blood Gas 41.4 38.0 - 51.0 %    Oxyhemoglobin 93.1 (L) 94 - 99 %    Methemoglobin 0.30 0.00 - 3.00 %    Carboxyhemoglobin 0.9 0 - 5 %    A-a DO2 84.1 0.0 - 300.0 mmHg    CO2 Content 9.3 (L) 22 - 33 mmol/L    Barometric Pressure for Blood Gas 729 mmHg    Modality Nasal Cannula     FIO2 28 %    Flow Rate 2.0 lpm    Ventilator Mode NA     Notified Who ER  AND RN     Notified By [id number]     Notified Time 04/20/2024 03:46     Collected by [id number]     pH, Temp Corrected      pCO2, Temperature Corrected      pO2, Temperature Corrected     ECG 12 Lead Altered Mental Status   Result Value Ref Range    QT Interval 402 ms    QTC Interval 436 ms   ECG 12 Lead Altered Mental Status   Result Value Ref Range    QT Interval 354 ms    QTC Interval 588 ms    ECG 12 Lead Rhythm Change   Result Value Ref Range    QT Interval 408 ms    QTC Interval 455 ms   Green Top (Gel)   Result Value Ref Range    Extra Tube Hold for add-ons.    Lavender Top   Result Value Ref Range    Extra Tube hold for add-on    Gold Top - SST   Result Value Ref Range    Extra Tube Hold for add-ons.    Light Blue Top   Result Value Ref Range    Extra Tube Hold for add-ons.                  Medical Decision Making  83-year-old female patient presents to the emergency room by EMS for altered mental status.Patient is from nursing home.  Nursing home called for EMS to bring her to the emergency room because she was not acting herself.  At baseline she is confused and screams constantly.  Today she has not been screaming as she normally would.  She has a history of dementia.    Problems Addressed:  Acute UTI: complicated acute illness or injury  Severe sepsis: complicated acute illness or injury    Amount and/or Complexity of Data Reviewed  Labs: ordered. Decision-making details documented in ED Course.  Radiology: ordered. Decision-making details documented in ED Course.  ECG/medicine tests: ordered.    Risk  Prescription drug management.        Final diagnoses:   Severe sepsis   Acute UTI       ED Disposition  ED Disposition       ED Disposition   Transfer to Another Facility     Condition   --    Comment   --               No follow-up provider specified.       Medication List      No changes were made to your prescriptions during this visit.            Marleen Rios PA  04/26/24 0095

## 2024-04-20 NOTE — PROGRESS NOTES
Continued Stay Note  [facility]     Patient Name: Luz Beasley  MRN: [id number]  Today's Date: 4/20/2024    Admit Date: 4/20/2024    Plan: Inpatient hospice   Discharge Plan       Row Name 04/20/24 1856       Plan    Plan Inpatient hospice    Plan Comments  10% pps 82yo  female with terminal diagnosis of sepsis admitted to inpatient hospice this day. Patient is observed with pallor. Unresponsive, relaxed face, jaw, body posture, respirations. Extremities warm. Rn actively listened as patient's legal guardian/granddaughter relays goal of comfort care. Rn provides for reflective communication and support. RN discusses hospice, nonverbal indicators of comfort, medications, eol signs/symptoms, eol communication, transfer to , and self care. Family verbalized understanding. Patient is admitted to inpatient hospice per JENNI GIBBONS/Dr. Huff approval. Dr. Cochran aware of discharge/readmit. Care coordinated with [facility].     Final Discharge Disposition Code 51 - hospice medical facility                   Discharge Codes    No documentation.                       Eli Mcneal RN

## 2024-04-20 NOTE — LETTER
[facility]  [address] [facility] [address]  [phone]        April 20, 2024      Patient: Luz Beasley  YOB: 1940  Date of Visit: 4/20/2024      Inpatient referral at [facility].    Attending: Rhianna GIBBONS  Certifying: Dr. Evelin Rivas  Referring: Dr. Letty Cochran    Thank you,        Eli Mcneal, RN

## 2024-04-20 NOTE — SIGNIFICANT NOTE
"Spoke with Ms. Beasley's POA, Sophia, regarding goals of care with Dr. Padilla, Urology. It was explained that a stenting procedure would be necessary if we were to pursue aggressive treatment, which would require intubation and that she may not make it through induction of anesthesia or the procedure itself given her current clinical condition. Potential dialysis, central line placement, and other heroic measures were discussed versus transitioning to a comfort-based approach.     After Sophia consulted with other family members, she has elected to make the patient comfort measures only upon her arrival to the hospital. The family lives in [place] and wanted to give her a chance for recovery; however, they have noticed a significant decline in the patient's mentation and quality of life in her nursing home. The patient has stated in the past she does not want to \"live on machines,\" and family wishes to respect her request.     Upon their arrival, patient will be transitioned to comfort measures only, orders will be adjusted, and Hospice will be consulted.    Florence Natarajan, MSN, APRN, ACNPC-AG  Pulmonary and Critical Care Medicine  Electronically signed by SHAI Garcia, 04/20/24, 10:05 AM EDT.     ADDENDUM: Patient family arrived and transitioned to comfort measures only. Vasopressors discontinued. Will consult Hospice for 4/21/24.  "

## 2024-04-20 NOTE — DISCHARGE SUMMARY
Discharge Summary    Patient name: Luz Beasley  CSN: [id number]  MRN: [id number]  : 1940  Today's date: 2024     Date of Admission: 2024  Date of Discharge:  2024    Admitting Physician:  Dr. Letty Cochran DO; Pulmonology  Primary Care Provider: Maricarmen De La Vega MD  Consultations:  Dr. David Padilla MD; Urology    Admission Diagnosis: Obstructive pyelonephritis     Discharge Diagnoses:   Active Hospital Problems    Diagnosis     **Obstructive pyelonephritis     Pyelonephritis     Inadequate nutrition     Septic shock     Acquired hypothyroidism     Renal failure     Hyperparathyroidism status post parathyroidectomy*     Diabetes mellitus*     Hyperlipemia*     Senile dementia*     Essential hypertension        Allergies:  Patient has no known allergies.    Code Status and Medical Interventions:   Ordered at: 24 1353     Code Status (Patient has no pulse and is not breathing):    No CPR (Do Not Attempt to Resuscitate)     Medical Interventions (Patient has pulse or is breathing):    Comfort Measures     History of Present Illness:  Luz Beasley is a 83 y.o. female who is a bed bound SNF resident w/ PMHX of DM, dementia, HTN, hyperparathyroidism/hypothyroidism, HLD, & who has a PEG tube placed for inadequate nutritional intake in 2024.       Patient is confused and constantly screams at her baseline.  It was noted that she was not screaming as per usual and so she was sent to [facility] ED where imaging showed right renal obstruction with a 7.8-mm stone in the proximal right ureter. Profoundly septic, with lactic acid initially 2.4 and steadily increasing.  Cr 7.87.  PCT > 100, WBC 21.8, platelets 39, 9% bands.  She was treated with 2 G rocephin & zyvox with transition to zosyn @ 0500.       Dr. Wright at [facility] ED spoke with granddaughter (Sophia) for consent for CVC.  Patient was made a DNR/DNI prior to air evac.      Hospital Course:  Upon arrival to [facility], urology was consulted. Patient  "remained awake but non responsive. Tay catheter was placed with turbid/bloody urine. She required fluid resuscitation and vasopressor support. Urology team and ICU staff called her granddaughter (POA) to discuss potential treatment options of stenting versus supportive care/comfort care. She stated that the patient has had a significant decline in mentation and did not want to prolong suffering or have the patient undergo a procedure. She elected to pursue comfort measures only and code status and orders were changed. Hospice was consulted and spoke with family on 4/20/24. She is being admitted to inpatient Hospice.     Vitals:  BP 99/49   Pulse 113   Temp 98.1 °F (36.7 °C) (Bladder)   Resp 24   Ht 167.6 cm (65.98\")   Wt 75 kg (165 lb 5.5 oz)   LMP  (LMP Unknown)   SpO2 93%   BMI 26.70 kg/m²     Physical Examination     Telemetry: Normal sinus rhythm.   Constitutional:  No acute distress.  Resting in bed on nasal cannula.    HEENT: Normocephalic and atraumatic.   Neck:  Normal range of motion. Neck supple.   No JVD present.   No thyromegaly.    Cardiovascular: Regular rate and rhythm.  No murmurs, rub or gallop.  No peripheral edema.   Respiratory: Normal respiratory effort.  Diminished.    Abdominal:  Soft. No masses.   Non-tender. No distension.   No hepatosplenomegaly.   MSK: Normal muscle strength and tone.   Extremities: No digital cyanosis or clubbing.   Skin: Skin is warm and dry.  No rashes, lesions or ulcers noted.    Neurological:             Eyes are open but does not respond.    Psychiatric:  Unable to assess.      Labs:  Results from last 7 days   Lab Units 04/19/24 1938   WBC 10*3/mm3 21.88*   HEMOGLOBIN g/dL 13.5   HEMATOCRIT % 41.7   PLATELETS 10*3/mm3 39*     Results from last 7 days   Lab Units 04/19/24 1956   SODIUM mmol/L 156*   POTASSIUM mmol/L 4.5   CHLORIDE mmol/L 114*   CO2 mmol/L 17.5*   BUN mg/dL 183*   CREATININE mg/dL 7.87*   CALCIUM mg/dL 9.4   BILIRUBIN mg/dL 1.6*   ALK PHOS " U/L 544*   ALT (SGPT) U/L 36*   AST (SGOT) U/L 71*   GLUCOSE mg/dL 322*         Magnesium   Date Value Ref Range Status   04/19/2024 3.4 (H) 1.6 - 2.4 mg/dL Final     Phosphorus   Date Value Ref Range Status   04/19/2024 4.7 (H) 2.5 - 4.5 mg/dL Final                    Discharge Medications        ASK your doctor about these medications        Instructions Start Date   aspirin 81 MG chewable tablet   81 mg, Oral, Daily      atorvastatin 80 MG tablet  Commonly known as: LIPITOR   80 mg, Oral, Nightly      bisacodyl 10 MG suppository  Commonly known as: DULCOLAX   10 mg, Rectal, Daily PRN      donepezil 10 MG tablet  Commonly known as: ARICEPT   10 mg, Oral, Nightly      Januvia 100 MG tablet  Generic drug: SITagliptin   100 mg, Oral, Daily      lamoTRIgine 100 MG tablet  Commonly known as: LaMICtal   100 mg, Oral, Every Morning      lamoTRIgine 25 MG tablet  Commonly known as: LaMICtal   75 mg, Oral, Every Evening      levothyroxine 50 MCG tablet  Commonly known as: SYNTHROID, LEVOTHROID   50 mcg, Oral, Every Early Morning      Linzess 145 MCG capsule capsule  Generic drug: linaclotide   145 mcg, Oral, Daily PRN      LORazepam 0.5 MG tablet  Commonly known as: ATIVAN   0.5 mg, Oral, Every 12 Hours      megestrol 40 MG/ML suspension  Commonly known as: MEGACE   400 mg, Oral, Daily      memantine 28 MG capsule sustained-release 24 hr extended release capsule  Commonly known as: NAMENDA XR   28 mg, Oral, Daily      potassium chloride 10 MEQ CR tablet  Commonly known as: KLOR-CON M10   10 mEq, Oral, Daily      senna 8.6 MG tablet  Commonly known as: SENOKOT   1 tablet, Oral, Daily PRN      sertraline 25 MG tablet  Commonly known as: ZOLOFT   75 mg, Oral, Nightly      topiramate 25 MG tablet  Commonly known as: TOPAMAX   25 mg, Oral, 2 Times Daily      vitamin D 1.25 MG (16618 UT) capsule capsule  Commonly known as: ERGOCALCIFEROL   50,000 Units, Oral, Weekly             Discharge Instructions:  Admit to inpatient  Hospice  Medications per Hospice    Florence Natarajan, MSN, APRN, ACNPC-AG  Pulmonary and Critical Care Medicine  Electronically signed by SHAI Garcia, 04/20/24, 6:34 PM EDT.     Time: I spent  20  minutes on this discharge activity which included: face-to-face encounter with the patient, reviewing the data in the system, coordination of the care with the nursing staff as well as consultants, documentation, and entering orders.      CC: Maricarmen De La Vega MD

## 2024-04-20 NOTE — H&P
INTENSIVIST   INITIAL HOSPITAL VISIT NOTE     Hospital:  LOS: 0 days     Ms. Luz Beasley, 83 y.o. female is seen for a Chief Complaint of:      Obstructive pyelonephritis    Diabetes mellitus*    Hyperlipemia*    Senile dementia*    Essential hypertension    Hyperparathyroidism status post parathyroidectomy*    Renal failure    Acquired hypothyroidism    Septic shock    Inadequate nutrition    Pyelonephritis      Subjective   S     Luz Beasley is a 83 y.o. female who is a bed bound SNF resident w/ PMHX of DM, dementia, HTN, hyperparathyroidism/hypothyroidism, HLD, &  who has a PEG tube placed for inadequate nutritional intake in 01/2024.      Patient is confused and constantly screams @ her baseline.  It was noted that she was not screaming as per usual and so she was sent to [facility] ED where imaging showed right renal obstruction w/ 7.8 mm stone in the proximal right ureter/  LActic acid initially 2.4 and steadily increasing.  Cr 7.87.  PCT > 100, WBC 21.8, platelets 39, 9% bands.  She was treated w/ 2 G rocephin & zyvox w/ transition to zosyn @ 0500.      Dr. Wright @ [facility] ED spoke w/ granddaughter (Sophia) for consent for CVC.  Patient was made a DNR/DNI prior to air evac.         PMH: She  has a past medical history of Anxiety, Cluster headache, Dementia, Depression, Diabetes mellitus, Disease of thyroid gland, sepsis, Hyperlipidemia, Hyperparathyroidism, Hypertension, Muscle weakness, OA (osteoarthritis), Obesity, Senile dementia, UTI (urinary tract infection), Vitamin D deficiency, and Vitamin D deficiency disease.   PSxH: She  has a past surgical history that includes Thyroid surgery; Hysterectomy; Colonoscopy (03/21/2008); Cardiovascular stress test (05/05/2006); Cardiac catheterization (05/05/2006); Extracorporeal shock wave lithotripsy (Right, 1/28/2022); Esophagogastroduodenoscopy w/ PEG (N/A, 1/5/2023); Esophagogastroduodenoscopy w/ PEG (N/A, 3/21/2023); Esophagogastroduodenoscopy w/ PEG (N/A, 7/5/2023);  and Esophagogastroduodenoscopy w/ PEG (N/A, 1/26/2024).      Medications:  Current Facility-Administered Medications on File Prior to Encounter   Medication    [COMPLETED] cefTRIAXone (ROCEPHIN) 2,000 mg in sodium chloride 0.9 % 100 mL IVPB-VTB    [COMPLETED] dilTIAZem (CARDIZEM) injection 10 mg    [COMPLETED] Linezolid (ZYVOX) 600 mg 300 mL    [COMPLETED] midazolam (VERSED) injection 2 mg    [COMPLETED] piperacillin-tazobactam (ZOSYN) IVPB 4.5 g IVPB in 100 mL NS (VTB)    [COMPLETED] sepsis fluid NS 0.9 % bolus 2,091 mL    [DISCONTINUED] norepinephrine (LEVOPHED) 8 mg in 250 mL NS infusion (premix)    [DISCONTINUED] sodium chloride 0.9 % flush 10 mL    [DISCONTINUED] sodium chloride 0.9 % infusion    [DISCONTINUED] Vasopressin (VASOSTRICT) 0.2 UNIT/ML solution     Current Outpatient Medications on File Prior to Encounter   Medication Sig    aspirin 81 MG chewable tablet Chew 1 tablet Daily.    atorvastatin (LIPITOR) 80 MG tablet Take 1 tablet by mouth Every Night.    bisacodyl (DULCOLAX) 10 MG suppository Insert 1 suppository into the rectum Daily As Needed for Constipation.    donepezil (ARICEPT) 10 MG tablet Take 1 tablet by mouth Every Night.    Januvia 100 MG tablet Take 1 tablet by mouth Daily.    lamoTRIgine (LaMICtal) 100 MG tablet Take 1 tablet by mouth Every Morning.    lamoTRIgine (LaMICtal) 25 MG tablet Take 3 tablets by mouth Every Evening.    levothyroxine (SYNTHROID, LEVOTHROID) 50 MCG tablet Take 1 tablet by mouth Every Morning.    Linzess 145 MCG capsule capsule Take 1 capsule by mouth Daily As Needed (constipation).    LORazepam (ATIVAN) 0.5 MG tablet Take 1 tablet by mouth Every 12 (Twelve) Hours.    megestrol (MEGACE) 40 MG/ML suspension Take 10 mL by mouth Daily.    memantine (NAMENDA XR) 28 MG capsule sustained-release 24 hr extended release capsule Take 1 capsule by mouth Daily.    potassium chloride (K-DUR,KLOR-CON) 10 MEQ CR tablet Take 1 tablet by mouth Daily.    senna (SENOKOT) 8.6 MG  tablet Take 1 tablet by mouth Daily As Needed for Constipation.    sertraline (ZOLOFT) 25 MG tablet Take 3 tablets by mouth Every Night.    topiramate (TOPAMAX) 25 MG tablet Take 1 tablet by mouth 2 (Two) Times a Day.    vitamin D (ERGOCALCIFEROL) 1.25 MG (88751 UT) capsule capsule Take 1 capsule by mouth 1 (One) Time Per Week.    [DISCONTINUED] acetaminophen (TYLENOL) 500 MG tablet Take 1 tablet by mouth Every 6 (Six) Hours As Needed for Mild Pain or Fever.    [DISCONTINUED] NAMZARIC 28-10 MG capsule sustained-release 24 hr Take 28 mg by mouth Every Night.       Allergies: She has No Known Allergies.   FH: Her Family history is unknown by patient.   SH: She  reports that she has never smoked. She has never used smokeless tobacco. She reports that she does not drink alcohol and does not use drugs.     The patient's relevant past medical, surgical and social history were reviewed and updated in Epic as appropriate.     ROS (14):   Review of Systems   Unable to perform ROS: Dementia       Objective   O     Vitals    Temp  Min: 97.5 °F (36.4 °C)  Max: 98.5 °F (36.9 °C)  BP  Min: 47/23  Max: 126/88  Pulse  Min: 57  Max: 174  Resp  Min: 20  Max: 40  SpO2  Min: 69 %  Max: 100 % No data recorded     I/O 24 hrs (7:00AM - 6:59 AM)  Intake/Output       None            Medications (drips):  lactated ringers, Last Rate: 75 mL/hr (04/20/24 0836)  norepinephrine, Last Rate: 0.5 mcg/kg/min (04/20/24 1202)  Pharmacy to dose vancomycin  vasopressin, Last Rate: 0.03 Units/min (04/20/24 8193)        Physical Examination    Telemetry: Normal sinus rhythm.   Constitutional:  No acute distress.  Resting in bed on nasal cannula.    HEENT: Normocephalic and atraumatic.   Neck:  Normal range of motion. Neck supple.   No JVD present.   No thyromegaly.    Cardiovascular: Regular rate and rhythm.  No murmurs, rub or gallop.  No peripheral edema.   Respiratory: Normal respiratory effort.  Diminished.    Abdominal:  Soft. No masses.    Non-tender. No distension.   No hepatosplenomegaly.   MSK: Normal muscle strength and tone.   Extremities: No digital cyanosis or clubbing.   Skin: Skin is warm and dry.  No rashes, lesions or ulcers noted.    Neurological:   Eyes are open but does not respond.    Psychiatric:  Unable to assess.            Results from last 7 days   Lab Units 04/19/24 1938   WBC 10*3/mm3 21.88*   HEMOGLOBIN g/dL 13.5   MCV fL 89.9   PLATELETS 10*3/mm3 39*     Results from last 7 days   Lab Units 04/19/24 1956   SODIUM mmol/L 156*   POTASSIUM mmol/L 4.5   CO2 mmol/L 17.5*   CREATININE mg/dL 7.87*   MAGNESIUM mg/dL 3.4*   PHOSPHORUS mg/dL 4.7*     Estimated Creatinine Clearance: 5.6 mL/min (A) (by C-G formula based on SCr of 7.87 mg/dL (H)).  Results from last 7 days   Lab Units 04/19/24 1956   ALK PHOS U/L 544*   BILIRUBIN mg/dL 1.6*   ALT (SGPT) U/L 36*   AST (SGOT) U/L 71*       Results from last 7 days   Lab Units 04/20/24  0340 04/19/24  2212   PH, ARTERIAL pH units 7.230* 7.308*   PCO2, ARTERIAL mm Hg 20.8* 30.0*   PO2 ART mm Hg 83.6 76.7*   FIO2 % 28 21       Images:   4/20/24      Imaging Results (Last 24 Hours)       ** No results found for the last 24 hours. **          ECG/EMG Results (last 24 hours)       ** No results found for the last 24 hours. **            I reviewed the patient's new laboratory and imaging results.  I independently reviewed the patient's new images.    Assessment & Plan   A / P     Ms. Beasley is a 82yo F bedbound nursing home resident with a history of DM, dementia, HTN, hypothyroidism, HLD, and failure to thrive s/p PEG tube placement in January 2024 who presented to the [facility] ED with worsening mental status.     She was found to have a right renal obstruction with a 7.8mm stone in the proximal right ureter.     She was noted to have acute renal failure with a BUN/Cr of 183/7.87. She has a severe metabolic acidosis with a pH of 7.23.     UA consistent with UTI.     Nutrition:   NPO Diet NPO Type:  Strict NPO  Advance Directives:   Code Status and Medical Interventions:   Ordered at: 04/20/24 0751     Medical Intervention Limits:    NO intubation (DNI)     Code Status (Patient has no pulse and is not breathing):    No CPR (Do Not Attempt to Resuscitate)     Medical Interventions (Patient has pulse or is breathing):    Limited Support         Obstructive pyelonephritis    Diabetes mellitus*    Hyperlipemia*    Senile dementia*    Essential hypertension    Hyperparathyroidism status post parathyroidectomy*    Renal failure    Acquired hypothyroidism    Septic shock    Inadequate nutrition    Pyelonephritis      Assessment / Plan:    Admit to ICU  Titrate Levophed.   Continue empiric Vancomycin and Zosyn.   Trend lactate  Ongoing discussions with family regarding goals of care in order to guide plans for further management.   Likely transition to comfort measures.     Critically ill secondary to shock.     Plan of care and goals reviewed during interdisciplinary rounds.  I discussed the patient's findings and my recommendations with nursing staff    Time:   Critical Care Time: was greater than 40 minutes.(excluding time spent on other separately billable procedures or treating other patients).        Letty V Case, DO  Pulmonary and Critical Care Medicine      4 minutes of critical care provided by SHAI Block in addendum accordance with split/shared billing. This time excludes other billable procedures. Time does include preparation of documents, medical consultations, review of old records, and direct bedside care. Patient is at high risk for life-threatening deterioration due to renal failure.   Electronically signed by SHAI Petty, 04/20/24, 6:18 AM EDT.

## 2024-04-20 NOTE — ED NOTES
Called [facility] and spoke with nurse about current code status and to see if there was any other number available to get a hold of a guardian and they stated they would try to call and see if they would call ER so that provider could get an update on wishes in regards to transfer vs admission.

## 2024-04-20 NOTE — CONSULTS
[facility]   HISTORY AND PHYSICAL    Patient Name: Luz Beasley  : 1940  MRN: [id number]  Primary Care Physician:  Maricarmen De La Vega MD  Date of admission: 2024    Subjective   Subjective     Chief Complaint: Right Ureteral Stone/Urosepsis    HPI:    Luz Beasley is a 83 y.o. female currently admitted to [facility] ICU secondary to sepsis, obstructing ureteral stone.  Patient information obtained via chart review.  Patient past medical history reported in chart includes DM, hypertension, hyperlipidemia, osteoarthritis, dementia.  Patient lives in nursing facility, presented to [facility] 2024 secondary to mental status changes.  Patient was identified to have profound hypotension and tachycardia.  Labs were obtained revealing WBC 21, creatinine 7.8, pro BNP 28,943, Pro-Jorge Luis greater than 100,000, lactate 2.4.  Urinalysis demonstrating positive RBC, 4+ bacteria, positive nitrate, positive leuk esterase.  CT abdominal imaging was obtained revealing approximately 7 to 8 mm right proximal ureteral stone, associated hydronephrosis, additional nonobstructing renal stone burden.  In review the patient has previously seen by urology in [place] for nephrolithiasis.  The patient was stabilized, transferred to [facility] for critical care.  Urology consulted a.m. 2024.  Patient was evaluated at bedside.  Patient is awake but nonresponsive.  Tay catheter in place with turbid and bloody urine.  Patient is requiring fluid resuscitation with both IV fluid and albumin, pressor agents for blood pressure management.    Review of Systems   Review of systems could not be obtained due to   patient confusion.    Personal History     Past Medical History:   Diagnosis Date    Anxiety     Cluster headache     Dementia     Depression     Diabetes mellitus     Disease of thyroid gland     Hx of sepsis     Hyperlipidemia     Hyperparathyroidism     Hypertension     Muscle weakness     OA (osteoarthritis)     Obesity      Senile dementia     UTI (urinary tract infection)     Vitamin D deficiency     Vitamin D deficiency disease        Past Surgical History:   Procedure Laterality Date    CARDIAC CATHETERIZATION  05/05/2006    CARDIOVASCULAR STRESS TEST  05/05/2006    COLONOSCOPY  03/21/2008    ENDOSCOPY W/ PEG TUBE PLACEMENT N/A 1/5/2023    Procedure: ESOPHAGOGASTRODUODENOSCOPY WITH PERCUTANEOUS ENDOSCOPIC GASTROSTOMY TUBE INSERTION;  Surgeon: Austyn Lr MD;  Location: [facility] OR;  Service: Gastroenterology;  Laterality: N/A;    ENDOSCOPY W/ PEG TUBE PLACEMENT N/A 3/21/2023    Procedure: ESOPHAGOGASTRODUODENOSCOPY WITH PERCUTANEOUS ENDOSCOPIC GASTROSTOMY TUBE INSERTION;  Surgeon: Nima Jarvis MD;  Location: [facility] OR;  Service: General;  Laterality: N/A;    ENDOSCOPY W/ PEG TUBE PLACEMENT N/A 7/5/2023    Procedure: ESOPHAGOGASTRODUODENOSCOPY WITH PERCUTANEOUS ENDOSCOPIC GASTROSTOMY TUBE INSERTION WITH ANESTHESIA;  Surgeon: Nima Jarvis MD;  Location: [facility] OR;  Service: Gastroenterology;  Laterality: N/A;    ENDOSCOPY W/ PEG TUBE PLACEMENT N/A 1/26/2024    Procedure: ESOPHAGOGASTRODUODENOSCOPY WITH PERCUTANEOUS ENDOSCOPIC GASTROSTOMY TUBE INSERTION WITH ANESTHESIA;  Surgeon: Nima Jarvis MD;  Location: [facility] OR;  Service: Gastroenterology;  Laterality: N/A;    EXTRACORPOREAL SHOCK WAVE LITHOTRIPSY (ESWL) Right 1/28/2022    Procedure: EXTRACORPOREAL SHOCKWAVE LITHOTRIPSY;  Surgeon: Yusef Willson MD;  Location: [facility] OR;  Service: Urology;  Laterality: Right;    HYSTERECTOMY      THYROID SURGERY         Family History: Family history is unknown by patient. Otherwise pertinent FHx was reviewed and not pertinent to current issue.    Social History:  reports that she has never smoked. She has never used smokeless tobacco. She reports that she does not drink alcohol and does not use drugs.    Home Medications:  LORazepam, SITagliptin, aspirin, atorvastatin, bisacodyl, donepezil, lamoTRIgine,  levothyroxine, linaclotide, megestrol, memantine, potassium chloride, senna, sertraline, topiramate, and vitamin D      Allergies:  No Known Allergies    Objective   Objective     Vitals:   Temp:  [97.5 °F (36.4 °C)-98.5 °F (36.9 °C)] 98.5 °F (36.9 °C)  Heart Rate:  [] 83  Resp:  [20-40] 20  BP: ()/() 83/59  Flow (L/min):  [2] 2  Physical Exam    Constitutional: In hospital bed, not alert   Eyes: PERRLA   HENT: Normocephalic, atraumatic, mucous membranes moist   Neck: Supple, trachea midline   Respiratory:Equal chest rise, labored respirations    Cardiovascular: Perfused   Gastrointestinal: Soft, nontender, non-distended   Psychiatric: AMS   Neurologic: AMS   Skin: No rashes     Result Review    Result Review:  I have personally reviewed the results from the time of this admission to 4/20/2024 10:19 EDT and agree with these findings:  [x]  Laboratory  [x]  Microbiology  [x]  Radiology  []  EKG/Telemetry   []  Cardiology/Vascular   []  Pathology  [x]  Old records  []  Other:    Most notable findings include:   1.  WBC 21, creatinine 7.8, lactate 5.6, elevated proBNP, Pro-Jorge Luis, C-reactive protein  2.  UA: Positive for nitrite, leuk esterase, 4+ bacteria  3.  CT imaging personally reviewed demonstrating 7 to 8 mm obstructing proximal right ureteral stone and associated hydronephrosis, additional nonobstructing stone burden.  No evidence of left nephrolithiasis, ureterolithiasis, hydronephrosis.    Assessment & Plan   Assessment / Plan     Brief Patient Summary:  Luz Beasley is a 83 y.o. female who currently admitted to [facility] ICU secondary to transfer from [facility].  Patient is identified to be profoundly septic, urinary source likely.  CT imaging has revealed obstructing proximal ureteral stone with associated hydronephrosis.  Urology consulted for evaluation.  Patient critically ill with significant laboratory and vital sign abnormalities.  Patient is requiring maximum resuscitation and pressor  agents for support.  Urology team as well as critical care team has discussed with patient's power of  decision for management.  We have discussed the critical nature of illness, indication for operative intervention with cystoscopy and ureteral stent placement to attempt at improvement.  We have discussed the associated risks of procedure including anesthesia, further infection, clinical decline intraoperatively and postoperatively.  After discussion with critical care service about expectations the family has decided to pursue comfort measures.  Continue critical care management and comfort measures will be managed by critical care service.  Urology available for support    Active Hospital Problems:  Active Hospital Problems    Diagnosis     **Obstructive pyelonephritis     Pyelonephritis     Inadequate nutrition     Septic shock     Acquired hypothyroidism     Renal failure     Hyperparathyroidism status post parathyroidectomy*     Diabetes mellitus*     Hyperlipemia*     Senile dementia*     Essential hypertension        DVT prophylaxis:  Mechanical DVT prophylaxis orders are present.        CODE STATUS:    Medical Intervention Limits: NO intubation (DNI)  Code Status (Patient has no pulse and is not breathing): No CPR (Do Not Attempt to Resuscitate)  Medical Interventions (Patient has pulse or is breathing): Limited Support    Admission Status: Per primary team    Electronically signed by David Padilla MD, 04/20/24, 10:11 AM EDT.

## 2024-04-20 NOTE — DISCHARGE PLACEMENT REQUEST
"Marie Beasley (83 y.o. Female)       Date of Birth   1940    Social Security Number       Address   [address]  [place] [address]    Home Phone   [phone]    MRN   [id number]       Buddhism   None    Marital Status                               Admission Date   4/20/24    Admission Type   Urgent    Admitting Provider   Ravin Garcia MD    Attending Provider   Letty Cochran DO    Department, Room/Bed   [facility] 2A ICU, N201/1       Discharge Date       Discharge Disposition       Discharge Destination                                 Attending Provider: Letty Cochran DO    Allergies: No Known Allergies    Isolation: None   Infection: Candida Auris (rule out) (04/19/24)   Code Status: No CPR    Ht: 167.6 cm (65.98\")   Wt: 75 kg (165 lb 5.5 oz)    Admission Cmt: None   Principal Problem: Obstructive pyelonephritis [N11.1]                   Active Insurance as of 4/20/2024       Primary Coverage       Payor Plan Insurance Group Employer/Plan Group    MEDICARE MEDICARE A & B        Payor Plan Address Payor Plan Phone Number Payor Plan Fax Number Effective Dates    [address] [phone]  5/1/2005 - None Entered    [place] [address]         Subscriber Name Subscriber Birth Date Member ID       MARIE BEASLEY 1940 [id number]                     Emergency Contacts        (Rel.) Home Phone Work Phone Mobile Phone    Sophia Craft (Legal Guardian) [phone] -- [phone]    Erika Beasley (Daughter) [phone] -- --              Emergency Contact Information       Name Relation Home Work Mobile    Sophia Craft Legal Guardian [phone]    Erika Beasley Daughter [phone]            Insurance Information                  MEDICARE/MEDICARE A & B Phone: [phone]    Subscriber: Marie Beasley Subscriber#: [id number]    Group#: -- Precert#: --        ANTHEM BLUE CROSS/ANTHEM BLUE CROSS BLUE SHIELD PPO Phone: [phone]    " Subscriber: Luz Beasley Subscriber#: [id number]    Group#: NGN Precert#: --             History & Physical        Case, Letty PRICE DO at 04/20/24 1246            INTENSIVIST   INITIAL HOSPITAL VISIT NOTE     Hospital:  LOS: 0 days     Ms. Luz Beasley, 83 y.o. female is seen for a Chief Complaint of:      Obstructive pyelonephritis    Diabetes mellitus*    Hyperlipemia*    Senile dementia*    Essential hypertension    Hyperparathyroidism status post parathyroidectomy*    Renal failure    Acquired hypothyroidism    Septic shock    Inadequate nutrition    Pyelonephritis      Subjective  S     Luz Beasley is a 83 y.o. female who is a bed bound SNF resident w/ PMHX of DM, dementia, HTN, hyperparathyroidism/hypothyroidism, HLD, &  who has a PEG tube placed for inadequate nutritional intake in 01/2024.      Patient is confused and constantly screams @ her baseline.  It was noted that she was not screaming as per usual and so she was sent to [facility] ED where imaging showed right renal obstruction w/ 7.8 mm stone in the proximal right ureter/  LActic acid initially 2.4 and steadily increasing.  Cr 7.87.  PCT > 100, WBC 21.8, platelets 39, 9% bands.  She was treated w/ 2 G rocephin & zyvox w/ transition to zosyn @ 0500.      Dr. Wright @ [facility] ED spoke w/ granddaughter (Sophia) for consent for CVC.  Patient was made a DNR/DNI prior to air evac.         PMH: She  has a past medical history of Anxiety, Cluster headache, Dementia, Depression, Diabetes mellitus, Disease of thyroid gland, sepsis, Hyperlipidemia, Hyperparathyroidism, Hypertension, Muscle weakness, OA (osteoarthritis), Obesity, Senile dementia, UTI (urinary tract infection), Vitamin D deficiency, and Vitamin D deficiency disease.   PSxH: She  has a past surgical history that includes Thyroid surgery; Hysterectomy; Colonoscopy (03/21/2008); Cardiovascular stress test (05/05/2006); Cardiac catheterization (05/05/2006); Extracorporeal shock wave lithotripsy (Right,  1/28/2022); Esophagogastroduodenoscopy w/ PEG (N/A, 1/5/2023); Esophagogastroduodenoscopy w/ PEG (N/A, 3/21/2023); Esophagogastroduodenoscopy w/ PEG (N/A, 7/5/2023); and Esophagogastroduodenoscopy w/ PEG (N/A, 1/26/2024).      Medications:  Current Facility-Administered Medications on File Prior to Encounter   Medication    [COMPLETED] cefTRIAXone (ROCEPHIN) 2,000 mg in sodium chloride 0.9 % 100 mL IVPB-VTB    [COMPLETED] dilTIAZem (CARDIZEM) injection 10 mg    [COMPLETED] Linezolid (ZYVOX) 600 mg 300 mL    [COMPLETED] midazolam (VERSED) injection 2 mg    [COMPLETED] piperacillin-tazobactam (ZOSYN) IVPB 4.5 g IVPB in 100 mL NS (VTB)    [COMPLETED] sepsis fluid NS 0.9 % bolus 2,091 mL    [DISCONTINUED] norepinephrine (LEVOPHED) 8 mg in 250 mL NS infusion (premix)    [DISCONTINUED] sodium chloride 0.9 % flush 10 mL    [DISCONTINUED] sodium chloride 0.9 % infusion    [DISCONTINUED] Vasopressin (VASOSTRICT) 0.2 UNIT/ML solution     Current Outpatient Medications on File Prior to Encounter   Medication Sig    aspirin 81 MG chewable tablet Chew 1 tablet Daily.    atorvastatin (LIPITOR) 80 MG tablet Take 1 tablet by mouth Every Night.    bisacodyl (DULCOLAX) 10 MG suppository Insert 1 suppository into the rectum Daily As Needed for Constipation.    donepezil (ARICEPT) 10 MG tablet Take 1 tablet by mouth Every Night.    Januvia 100 MG tablet Take 1 tablet by mouth Daily.    lamoTRIgine (LaMICtal) 100 MG tablet Take 1 tablet by mouth Every Morning.    lamoTRIgine (LaMICtal) 25 MG tablet Take 3 tablets by mouth Every Evening.    levothyroxine (SYNTHROID, LEVOTHROID) 50 MCG tablet Take 1 tablet by mouth Every Morning.    Linzess 145 MCG capsule capsule Take 1 capsule by mouth Daily As Needed (constipation).    LORazepam (ATIVAN) 0.5 MG tablet Take 1 tablet by mouth Every 12 (Twelve) Hours.    megestrol (MEGACE) 40 MG/ML suspension Take 10 mL by mouth Daily.    memantine (NAMENDA XR) 28 MG capsule sustained-release 24 hr  extended release capsule Take 1 capsule by mouth Daily.    potassium chloride (K-DUR,KLOR-CON) 10 MEQ CR tablet Take 1 tablet by mouth Daily.    senna (SENOKOT) 8.6 MG tablet Take 1 tablet by mouth Daily As Needed for Constipation.    sertraline (ZOLOFT) 25 MG tablet Take 3 tablets by mouth Every Night.    topiramate (TOPAMAX) 25 MG tablet Take 1 tablet by mouth 2 (Two) Times a Day.    vitamin D (ERGOCALCIFEROL) 1.25 MG (54760 UT) capsule capsule Take 1 capsule by mouth 1 (One) Time Per Week.    [DISCONTINUED] acetaminophen (TYLENOL) 500 MG tablet Take 1 tablet by mouth Every 6 (Six) Hours As Needed for Mild Pain or Fever.    [DISCONTINUED] NAMZARIC 28-10 MG capsule sustained-release 24 hr Take 28 mg by mouth Every Night.       Allergies: She has No Known Allergies.   FH: Her Family history is unknown by patient.   SH: She  reports that she has never smoked. She has never used smokeless tobacco. She reports that she does not drink alcohol and does not use drugs.     The patient's relevant past medical, surgical and social history were reviewed and updated in Epic as appropriate.     ROS (14):   Review of Systems   Unable to perform ROS: Dementia       Objective  O     Vitals    Temp  Min: 97.5 °F (36.4 °C)  Max: 98.5 °F (36.9 °C)  BP  Min: 47/23  Max: 126/88  Pulse  Min: 57  Max: 174  Resp  Min: 20  Max: 40  SpO2  Min: 69 %  Max: 100 % No data recorded     I/O 24 hrs (7:00AM - 6:59 AM)  Intake/Output       None            Medications (drips):  lactated ringers, Last Rate: 75 mL/hr (04/20/24 0836)  norepinephrine, Last Rate: 0.5 mcg/kg/min (04/20/24 1202)  Pharmacy to dose vancomycin  vasopressin, Last Rate: 0.03 Units/min (04/20/24 0288)        Physical Examination    Telemetry: Normal sinus rhythm.   Constitutional:  No acute distress.  Resting in bed on nasal cannula.    HEENT: Normocephalic and atraumatic.   Neck:  Normal range of motion. Neck supple.   No JVD present.   No thyromegaly.    Cardiovascular: Regular  rate and rhythm.  No murmurs, rub or gallop.  No peripheral edema.   Respiratory: Normal respiratory effort.  Diminished.    Abdominal:  Soft. No masses.   Non-tender. No distension.   No hepatosplenomegaly.   MSK: Normal muscle strength and tone.   Extremities: No digital cyanosis or clubbing.   Skin: Skin is warm and dry.  No rashes, lesions or ulcers noted.    Neurological:   Eyes are open but does not respond.    Psychiatric:  Unable to assess.            Results from last 7 days   Lab Units 04/19/24 1938   WBC 10*3/mm3 21.88*   HEMOGLOBIN g/dL 13.5   MCV fL 89.9   PLATELETS 10*3/mm3 39*     Results from last 7 days   Lab Units 04/19/24 1956   SODIUM mmol/L 156*   POTASSIUM mmol/L 4.5   CO2 mmol/L 17.5*   CREATININE mg/dL 7.87*   MAGNESIUM mg/dL 3.4*   PHOSPHORUS mg/dL 4.7*     Estimated Creatinine Clearance: 5.6 mL/min (A) (by C-G formula based on SCr of 7.87 mg/dL (H)).  Results from last 7 days   Lab Units 04/19/24 1956   ALK PHOS U/L 544*   BILIRUBIN mg/dL 1.6*   ALT (SGPT) U/L 36*   AST (SGOT) U/L 71*       Results from last 7 days   Lab Units 04/20/24  0340 04/19/24  2212   PH, ARTERIAL pH units 7.230* 7.308*   PCO2, ARTERIAL mm Hg 20.8* 30.0*   PO2 ART mm Hg 83.6 76.7*   FIO2 % 28 21       Images:   4/20/24      Imaging Results (Last 24 Hours)       ** No results found for the last 24 hours. **          ECG/EMG Results (last 24 hours)       ** No results found for the last 24 hours. **            I reviewed the patient's new laboratory and imaging results.  I independently reviewed the patient's new images.    Assessment & Plan  A / P     Ms. Beasley is a 82yo F bedbound nursing home resident with a history of DM, dementia, HTN, hypothyroidism, HLD, and failure to thrive s/p PEG tube placement in January 2024 who presented to the [facility] ED with worsening mental status.     She was found to have a right renal obstruction with a 7.8mm stone in the proximal right ureter.     She was noted to have acute renal  failure with a BUN/Cr of 183/7.87. She has a severe metabolic acidosis with a pH of 7.23.     UA consistent with UTI.     Nutrition:   NPO Diet NPO Type: Strict NPO  Advance Directives:   Code Status and Medical Interventions:   Ordered at: 04/20/24 0751     Medical Intervention Limits:    NO intubation (DNI)     Code Status (Patient has no pulse and is not breathing):    No CPR (Do Not Attempt to Resuscitate)     Medical Interventions (Patient has pulse or is breathing):    Limited Support         Obstructive pyelonephritis    Diabetes mellitus*    Hyperlipemia*    Senile dementia*    Essential hypertension    Hyperparathyroidism status post parathyroidectomy*    Renal failure    Acquired hypothyroidism    Septic shock    Inadequate nutrition    Pyelonephritis      Assessment / Plan:    Admit to ICU  Titrate Levophed.   Continue empiric Vancomycin and Zosyn.   Trend lactate  Ongoing discussions with family regarding goals of care in order to guide plans for further management.   Likely transition to comfort measures.     Critically ill secondary to shock.     Plan of care and goals reviewed during interdisciplinary rounds.  I discussed the patient's findings and my recommendations with nursing staff    Time:   Critical Care Time: was greater than 40 minutes.(excluding time spent on other separately billable procedures or treating other patients).        Letty Cochran DO  Pulmonary and Critical Care Medicine      4 minutes of critical care provided by SHAI Block in addendum accordance with split/shared billing. This time excludes other billable procedures. Time does include preparation of documents, medical consultations, review of old records, and direct bedside care. Patient is at high risk for life-threatening deterioration due to renal failure.   Electronically signed by SHAI Petty, 04/20/24, 6:18 AM EDT.           Electronically signed by Letty Cochran DO at 04/20/24 6294

## 2024-04-21 LAB
BACTERIA SPEC AEROBE CULT: NORMAL
L PNEUMO1 AG UR QL IA: NEGATIVE
QT INTERVAL: 354 MS
QT INTERVAL: 402 MS
QT INTERVAL: 408 MS
QTC INTERVAL: 436 MS
QTC INTERVAL: 455 MS
QTC INTERVAL: 588 MS
S PNEUM AG SPEC QL LA: POSITIVE

## 2024-04-21 NOTE — H&P
Hospice History and Physical     Patient Name:  Luz Beasley   : 1940   Sex: female    Patient Care Team:  Maricarmen De La Vega MD as PCP - General (Geriatric Medicine)    Code Status: DNR/DNI    Subjective     Luz Beasley is a 83 y.o. female who is a bed bound SNF resident w/ PMHX of DM, dementia, HTN, hyperparathyroidism/hypothyroidism, HLD, &  who has a PEG tube placed for inadequate nutritional intake in 2024.       Patient is confused and constantly screams @ her baseline.  It was noted that she was not screaming as per usual and so she was sent to [facility] ED where imaging showed right renal obstruction w/ 7.8 mm stone in the proximal right ureter/  LActic acid initially 2.4 and steadily increasing.  Cr 7.87.  PCT > 100, WBC 21.8, platelets 39, 9% bands.  She was treated w/ 2 G rocephin & zyvox w/ transition to zosyn @ 0500.       Dr. Wright @ [facility] ED spoke w/ granddaughter (Sophia) for consent for CVC.  Patient was made a DNR/DNI prior to air evac.           Hospital Course:     Inpatient hospice team met with family at bedside.  Inpatient team reviewed inpatient hospice service, medication management, and goals of care.  Family elected comfort focused POC.    Patient admitted to inpatient hospice on 2024 with terminal diagnosis of Sepsis [A41.9] for management of severe pain, agitation, and dyspnea that will require injectable medications and frequent skilled nursing assessments to manage.      Review of Systems:  Review of Systems   Unable to perform ROS: Acuity of condition       History:  Past Medical History:   Diagnosis Date    Anxiety     Cluster headache     Dementia     Depression     Diabetes mellitus     Disease of thyroid gland     Hx of sepsis     Hyperlipidemia     Hyperparathyroidism     Hypertension     Muscle weakness     OA (osteoarthritis)     Obesity     Senile dementia     UTI (urinary tract infection)     Vitamin D deficiency     Vitamin D deficiency disease      Past  Surgical History:   Procedure Laterality Date    CARDIAC CATHETERIZATION  05/05/2006    CARDIOVASCULAR STRESS TEST  05/05/2006    COLONOSCOPY  03/21/2008    ENDOSCOPY W/ PEG TUBE PLACEMENT N/A 1/5/2023    Procedure: ESOPHAGOGASTRODUODENOSCOPY WITH PERCUTANEOUS ENDOSCOPIC GASTROSTOMY TUBE INSERTION;  Surgeon: Austyn Lr MD;  Location: [facility] OR;  Service: Gastroenterology;  Laterality: N/A;    ENDOSCOPY W/ PEG TUBE PLACEMENT N/A 3/21/2023    Procedure: ESOPHAGOGASTRODUODENOSCOPY WITH PERCUTANEOUS ENDOSCOPIC GASTROSTOMY TUBE INSERTION;  Surgeon: Nima Jarvis MD;  Location: [facility] OR;  Service: General;  Laterality: N/A;    ENDOSCOPY W/ PEG TUBE PLACEMENT N/A 7/5/2023    Procedure: ESOPHAGOGASTRODUODENOSCOPY WITH PERCUTANEOUS ENDOSCOPIC GASTROSTOMY TUBE INSERTION WITH ANESTHESIA;  Surgeon: Nima Jarvis MD;  Location: [facility] OR;  Service: Gastroenterology;  Laterality: N/A;    ENDOSCOPY W/ PEG TUBE PLACEMENT N/A 1/26/2024    Procedure: ESOPHAGOGASTRODUODENOSCOPY WITH PERCUTANEOUS ENDOSCOPIC GASTROSTOMY TUBE INSERTION WITH ANESTHESIA;  Surgeon: Nima Jarvis MD;  Location: [facility] OR;  Service: Gastroenterology;  Laterality: N/A;    EXTRACORPOREAL SHOCK WAVE LITHOTRIPSY (ESWL) Right 1/28/2022    Procedure: EXTRACORPOREAL SHOCKWAVE LITHOTRIPSY;  Surgeon: Yusef Willson MD;  Location: [facility] OR;  Service: Urology;  Laterality: Right;    HYSTERECTOMY      THYROID SURGERY       Current Facility-Administered Medications   Medication Dose Route Frequency Provider Last Rate Last Admin    Biotene dry mouth liquid 1 Application  1 Application Mouth/Throat 4x Daily PRN Rhianna Torres APRN   1 Application at 04/20/24 2316    And    hydrogen peroxide 3 % external solution   Mouth/Throat 4x Daily PRN Rhianna Torres APRN   Given at 04/20/24 2316    And    mineral oil liquid 30 mL  30 mL Mouth/Throat 4x Daily PRN Rhianna Torres APRN   30 mL at 04/20/24 2316    bisacodyl (DULCOLAX)  suppository 10 mg  10 mg Rectal Daily PRN Rhianna Torres APRN        furosemide (LASIX) injection 20 mg  20 mg Intravenous Q8H PRN Rhianna Torres APRN        glycopyrrolate (ROBINUL) injection 0.2 mg  0.2 mg Intravenous Q4H PRN Rhianna Torres APRN        HYDROmorphone (DILAUDID) injection 0.5 mg  0.5 mg Intravenous Q2H PRN Rhianna Torres APRN   0.5 mg at 04/20/24 2032    midazolam (VERSED) injection 0.5 mg  0.5 mg Intravenous Q2H PRN Rhianna Torres APRN        polyvinyl alcohol (LIQUIFILM) 1.4 % ophthalmic solution 1 drop  1 drop Both Eyes Q30 Min PRN Rhianna Torres APRN        scopolamine patch 1 mg/72 hr  1 patch Transdermal Q72H PRN Rhianna Torres APRN              Biotene dry mouth **AND** hydrogen peroxide **AND** mineral oil    bisacodyl    furosemide    glycopyrrolate    HYDROmorphone    midazolam    polyvinyl alcohol    Scopolamine  No Known Allergies  Family History   Family history unknown: Yes     Social History     Socioeconomic History    Marital status:    Tobacco Use    Smoking status: Never    Smokeless tobacco: Never   Vaping Use    Vaping status: Never Used   Substance and Sexual Activity    Alcohol use: No    Drug use: No    Sexual activity: Defer       Objective     Vital Signs:  Temp:  [96.8 °F (36 °C)-98.1 °F (36.7 °C)] 96.8 °F (36 °C)  Heart Rate:  [] 81  Resp:  [22-24] 22  BP: ()/(25-66) 99/49    PPS:    Physical Exam:  Physical Exam  Constitutional:       Comments: Does not respond to verbal or tactile stimuli   HENT:      Head: Normocephalic and atraumatic.      Mouth/Throat:      Mouth: Mucous membranes are moist.   Eyes:      Comments: Does not open eyes upon assessment   Cardiovascular:      Rate and Rhythm: Normal rate and regular rhythm.   Pulmonary:      Effort: Pulmonary effort is normal.   Abdominal:      General: Abdomen is flat.      Palpations: Abdomen is soft.   Skin:     General: Skin is warm and dry.    Neurological:      Mental Status: She is unresponsive.         Results Reviewed:  LAB RESULTS:      Lab 04/20/24  0507 04/20/24  0139 04/19/24 2214 04/19/24 1956 04/19/24 1938   WBC  --   --   --   --  21.88*   HEMOGLOBIN  --   --   --   --  13.5   HEMATOCRIT  --   --   --   --  41.7   PLATELETS  --   --   --   --  39*   NEUTROS ABS  --   --   --   --  19.47*   MCV  --   --   --   --  89.9   SED RATE  --   --   --   --  96*   CRP  --   --   --  46.10*  --    PROCALCITONIN  --   --   --   --  >100.00*   LACTATE 5.6* 4.5* 2.4*  --  2.2*         Lab 04/19/24 1956   SODIUM 156*   POTASSIUM 4.5   CHLORIDE 114*   CO2 17.5*   ANION GAP 24.5*   *   CREATININE 7.87*   EGFR 4.7*   GLUCOSE 322*   CALCIUM 9.4   MAGNESIUM 3.4*   PHOSPHORUS 4.7*   TSH 2.910         Lab 04/19/24 1956   TOTAL PROTEIN 7.1   ALBUMIN 2.5*   GLOBULIN 4.6   ALT (SGPT) 36*   AST (SGOT) 71*   BILIRUBIN 1.6*   ALK PHOS 544*         Lab 04/19/24 2214 04/19/24 1956   PROBNP  --  28,943.0*   HSTROP T 174* 195*                 Lab 04/20/24  0340 04/19/24 2212   PH, ARTERIAL 7.230* 7.308*   PCO2, ARTERIAL 20.8* 30.0*   PO2 ART 83.6 76.7*   O2 SATURATION ART 94.2 94.2   FIO2 28 21   HCO3 ART 8.7* 15.0*   BASE EXCESS ART -16.8* -10.0*   CARBOXYHEMOGLOBIN 0.9 1.2     Brief Urine Lab Results  (Last result in the past 365 days)        Color   Clarity   Blood   Leuk Est   Nitrite   Protein   CREAT   Urine HCG        04/20/24 0922 Red   Turbid   Large (3+)   Large (3+)   Positive   >=300 mg/dL (3+)                   Microbiology Results Abnormal       None              Sepsis      Assessment & Plan     Assessment/Plan:   -Admit to inpatient hospice service 04/20/2024 with Sepsis [A41.9].     -Coordination of care with nursing staff and BCN IDT.     -Pain:  Dilaudid 1mg q2h prn    -Dyspnea:   Dilaudid as above, oxygen via NC PRN     -Nausea/Vomiting: Zofran 4 mg scheduled q 6 hr PRN       -Anxiety/Agitation: Versed 1 mg q 2 hrs PRN; haldol 1 mg q 4 hrs  PRN      -Bowel/bladder: bisacodyl supp q day PRN     -Nutrition: Comfort diet as tolerated     -ADLs: total care     -Terminal fever: tylenol supp 650 mg q 6 hr PRN. tordal 15 mg IV q 6 hr PRN     -Terminal secretions:  May start PRN robinul/scop patch/lasix if needed     -Patient comfort: palliative oral rinse PRN, liquifilm PRN      -Goals of Care: achieve comfortable death, educate and support family through this process.           Total Visit Time:30  Face to Face Time:15  Total time spent includes time reviewing chart, face-to-face time, counseling patient/family/caregiver, ordering medications/tests/procedures, communicating with other health care professionals, documenting clinical information in the electronic health record, and coordination of care with facility staff and [facility] IDT.      Verbal certification obtained from Dr Rivas who has determined patient's eligibility with terminal diagnosis of Sepsis [A41.9].  Medications and diagnosis determined to be related to terminal prognosis. See physician's CTI for information on eligibility determination.      Justification for care:  Patient meets criteria for acute in-patient care with required nursing assessment and interventions for symptoms with IV medications.      Rhianna GIBBONS  [facility]  Hospice and Palliative Care Nurse Practitioner  04/21/24  09:00 EDT

## 2024-04-21 NOTE — PLAN OF CARE
Goal Outcome Evaluation:  Plan of Care Reviewed With: patient        Progress: declining  Outcome Evaluation: Pt transferred from ICU. Appears comfortable at this time no PRN's needed. F/C patent. Will continue to monitor.

## 2024-04-21 NOTE — PROGRESS NOTES
Continued Stay Note  [facility]     Patient Name: Luz Beasley  MRN: [id number]  Today's Date: 4/21/2024    Admit Date: 4/20/2024    Plan: Inpatient hospice   Discharge Plan       Row Name 04/21/24 1203       Plan    Plan Inpatient hospice    Plan Comments 10% PPS. Patient with facial grimacing, holding her hand on her face. Nurse at bedside giving prns of Dilaudid and Versed. Patient appears to have bit her lip. Blood noted on bottom lip and teeth. Attempted to clean patient's mouth but this is causing her distress. Called granddaughter, Sophia with an update. Patient continues to require inpatient hospice for skilled nursing assessment, medication titration, and injectable medication.                   Discharge Codes    No documentation.                 Expected Discharge Date and Time       Expected Discharge Date Expected Discharge Time    Apr 20, 2024               Barbra Melchor RN

## 2024-04-21 NOTE — PLAN OF CARE
Goal Outcome Evaluation:           Progress: declining                   Problem: End-of-Life Care  Goal: Comfort, Peace and Preserved Dignity  Outcome: Ongoing, Progressing      Patient resting comfortably on RA. PRN and scheduled meds adjusted by hospice care team. UO decreased/rust colored. Responds only to voice or movement. Will continue plan of care.

## 2024-04-22 NOTE — PLAN OF CARE
Goal Outcome Evaluation:  Plan of Care Reviewed With: patient        Progress: declining  Outcome Evaluation: Ar this time patient appears comfortable. PRN's given for pain control. UOP minimal, F/C patent. Will continue hospice care.

## 2024-04-22 NOTE — PROGRESS NOTES
Hospice Progress Note    Patient Name: Luz Beasley   : 1940  Gender: female    Code Status: comfort measures    Date of Admission: 2024    Subjective:  Luz Beasley is a 83 y.o. female admitted to inpatient hospice 2024 with diagnosis of Sepsis [A41.9]  for symptom management.     No family present today.  She has bloody exudate present  in oral cavity.  Restless on assessment, multiple PRN's required overnight to manage pain and dyspnea.     - Scheduled:  Dilaudid 1 mg q 4 hrs   Versed 1 mg q 6 hrs     - PRNs:  Versed 1 mg x 3   Lasix 20 mg x 1  Robinul 0.2 mg x 1   Dilaudid 1 mg x 3   Viscous lidocaine x 2       - Intake/Output  Intake & Output (last 3 days)          07 07 07 07 07 07 07 0700    P.O.    0    Total Intake    0    Urine  25 100     Total Output  25 100     Net  -25 -100 0                       ROS:  Review of Systems   Unable to perform ROS: Acuity of condition       Reviewed current scheduled and prn medications for route, type, dose and frequency.       Biotene dry mouth **AND** hydrogen peroxide **AND** mineral oil    bisacodyl    furosemide    glycopyrrolate    HYDROmorphone    Lidocaine Viscous HCl    midazolam    polyvinyl alcohol    Scopolamine    Objective:   Pulse 81   Temp 96.8 °F (36 °C) (Bladder)   Resp 22   LMP  (LMP Unknown)   SpO2 97%      PPS: Palliative Performance Scale score as of 2024, 15:50 EDT is 10% based on the following measures:   Ambulation: Totally bed bound  Activity and Evidence of Disease: Unable to do any work, extensive evidence of disease  Self-Care: Total care  Intake:  Mouth care only  LOC: Drowsy or coma     Physical Exam:  Physical Exam  Vitals and nursing note reviewed.   Constitutional:       Appearance: She is ill-appearing.   HENT:      Head: Normocephalic.      Mouth/Throat:      Mouth: Mucous membranes are dry.      Pharynx: Oropharyngeal exudate present.       Spoke with patient given appointment 09/29/20 ,informed  Crestor medication sent to his pharmacy. Comments: Bloody exudate on teeth and buccal mucosa.   Cardiovascular:      Rate and Rhythm: Normal rate.   Pulmonary:      Effort: Pulmonary effort is normal.      Breath sounds: Wheezing and rales present.   Abdominal:      General: Bowel sounds are normal. There is no distension.      Tenderness: There is no abdominal tenderness. There is no guarding.   Musculoskeletal:         General: No swelling.      Comments: Contracted BLE   Neurological:      Mental Status: She is unresponsive.             Sepsis      Assessment/Plan:   Luz Beasley is a 83 y.o. female admitted to inpatient hospice 04/20/2024 with diagnosis of Sepsis [A41.9]  for symptom management.     Symptoms:  Pain   Dyspnea   Terminal restlessness   Constipation     Discharge Disposition: EOLC     -Increase dilaudid to 1 mg q 4 hrs scheduled.   -Continue PRN dilaudid for BT pain or dyspnea.   -Increase versed to 1 mg q 4 hrs   -Continue PRN versed       Total Visit Time: 20 min   Face to Face Time: 10 min     Justification for care:  Patient meets criteria for acute in-patient care due to need for frequent skilled nursing assessments to determine patient comfort and medication effectiveness at end of life.  Frequent adjustments to medications and interventions for symptom management, including injectable medications.      Linda Hernandez, MSN, APRN  [facility]  Hospice and Palliative Care Nurse Practitioner  04/22/24  14:43 EDT

## 2024-04-22 NOTE — PROGRESS NOTES
Continued Stay Note   [facility]     Patient Name: Luz Beasley  MRN: [id number]  Today's Date: 4/22/2024    Admit Date: 4/20/2024    Plan: Inpatient hospice   Discharge Plan       Row Name 04/22/24 1715       Plan    Plan Comments  10% pps is a 82yo female with a terminal diagnosis of Sepsis. Chart reviewed, visit to bedside, assess completed. Patient observed with slightly labored respirations, observed mild accessory muscle use with work of breathing. [facility] to medicate with prn medication for respiratory comfort. RN reassured patient that she is safe and cared for. Patient continues to require injectable medications for symptom palliation necessitating skilled nursing care. Care coordinated with [facility]. Updated DEE Hernandez APRN.     Final Discharge Disposition Code 51 - hospice medical facility                   Discharge Codes    No documentation.                 Expected Discharge Date and Time       Expected Discharge Date Expected Discharge Time    Apr 20, 2024               Eli Mcneal RN

## 2024-04-23 LAB
BACTERIA SPEC AEROBE CULT: ABNORMAL

## 2024-04-23 NOTE — PROGRESS NOTES
Continued Stay Note  [facility]     Patient Name: Luz Beasley  MRN: [id number]  Today's Date: 4/23/2024    Admit Date: 4/20/2024    Plan: Inpatient hospice   Discharge Plan       Row Name 04/23/24 0933       Plan    Plan Inpatient hospice    Plan Comments 10% PPS. Patient resting peacefully with eyes closed. Undisturbed by assessment. R foot cold, toes mottled. Breathing even and unlabored. Patient has required the following prns in the past 24 hours: Robinul 0.2mg x5, Lasix 20mg x3, viscous lidocaine x2. She continues to require inpatient hospice for skilled nursing assessment, medication titration, and injectable medication administration. Called granddaughter Sophia with an update.                   Discharge Codes    No documentation.                 Expected Discharge Date and Time       Expected Discharge Date Expected Discharge Time    Apr 20, 2024               Barbra Melchor RN

## 2024-04-23 NOTE — PROGRESS NOTES
Hospice Progress Note    Patient Name: Luz Beasley   : 1940  Gender: female    Code Status: comfort measures    Date of Admission: 2024    Subjective:  Luz Beasley is a 83 y.o. female admitted to inpatient hospice 2024 with diagnosis of Sepsis [A41.9]  for symptom management.     No family present today.  She has bloody exudate present  in oral cavity.  Restless on assessment, multiple PRN's required overnight to manage pain and dyspnea.     - Scheduled:  Dilaudid 1 mg q 4 hrs   Versed 1 mg q 6 hrs     - PRNs:  Versed 1 mg x 3   Lasix 20 mg x 1  Robinul 0.2 mg x 1   Dilaudid 1 mg x 3   Viscous lidocaine x 2       - Intake/Output  Intake & Output (last 3 days)          07 07 07 07 07 07 07 0700    P.O.    0    Total Intake    0    Urine  25 100     Total Output  25 100     Net  -25 -100 0                       ROS:  Review of Systems   Unable to perform ROS: Acuity of condition       Reviewed current scheduled and prn medications for route, type, dose and frequency.       Biotene dry mouth **AND** hydrogen peroxide **AND** mineral oil    bisacodyl    furosemide    glycopyrrolate    HYDROmorphone    Lidocaine Viscous HCl    midazolam    polyvinyl alcohol    Scopolamine    Objective:   Pulse 81   Temp 96.8 °F (36 °C) (Bladder)   Resp 22   LMP  (LMP Unknown)   SpO2 97%      PPS: Palliative Performance Scale score as of 2024, 15:52 EDT is 10% based on the following measures:   Ambulation: Totally bed bound  Activity and Evidence of Disease: Unable to do any work, extensive evidence of disease  Self-Care: Total care  Intake:  Mouth care only  LOC: Drowsy or coma     Physical Exam:  Physical Exam  Vitals and nursing note reviewed.   Constitutional:       Appearance: She is ill-appearing.   HENT:      Head: Normocephalic.      Mouth/Throat:      Mouth: Mucous membranes are dry.      Pharynx: Oropharyngeal exudate present.       Comments: Bloody exudate on teeth and buccal mucosa.   Cardiovascular:      Rate and Rhythm: Normal rate.   Pulmonary:      Effort: Pulmonary effort is normal.      Breath sounds: Wheezing and rales present.   Abdominal:      General: Bowel sounds are normal. There is no distension.      Tenderness: There is no abdominal tenderness. There is no guarding.   Musculoskeletal:         General: No swelling.      Comments: Contracted BLE   Neurological:      Mental Status: She is unresponsive.             Sepsis      Assessment/Plan:   Luz Beasley is a 83 y.o. female admitted to inpatient hospice 04/20/2024 with diagnosis of Sepsis [A41.9]  for symptom management.     Symptoms:  Pain   Dyspnea   Terminal restlessness   Constipation     Discharge Disposition: EOLC     -Increase dilaudid to 1 mg q 4 hrs scheduled.   -Continue PRN dilaudid for BT pain or dyspnea.   -Increase versed to 1 mg q 4 hrs   -Continue PRN versed       Total Visit Time: 20 min   Face to Face Time: 10 min     Justification for care:  Patient meets criteria for acute in-patient care due to need for frequent skilled nursing assessments to determine patient comfort and medication effectiveness at end of life.  Frequent adjustments to medications and interventions for symptom management, including injectable medications.      Linda Hernandez, MSN, APRN  [facility]  Hospice and Palliative Care Nurse Practitioner  04/25/24  15:52 EDT

## 2024-04-23 NOTE — PLAN OF CARE
Goal Outcome Evaluation:  Plan of Care Reviewed With: patient        Progress: declining  Outcome Evaluation: Pt appears comfortable. PRN robinul and lasix given for excess secretions. F/C patent, UOP minimal. Oral care completed several times throughout the night r/t bleeding cuts inside the mouth. Will continue hospice care.

## 2024-04-23 NOTE — PLAN OF CARE
Goal Outcome Evaluation:Comfort and peacefulness maintained this shift.  Small amount of bloody urine into bedside drainage bag. Remains on 4 litres / min NC oxygen. Continue close monitoring and comfort measures.

## 2024-04-24 LAB — BACTERIA ISLT: NORMAL

## 2024-04-24 NOTE — PROGRESS NOTES
Continued Stay Note  [facility]     Patient Name: Luz Beasley  MRN: [id number]  Today's Date: 4/24/2024    Admit Date: 4/20/2024    Plan: Inpatient hospice   Discharge Plan       Row Name 04/24/24 0928       Plan    Plan Inpatient hospice    Plan Comments 10% PPS. Patient resting peacefully with eyes closed. Undisturbed by assessment. Oral care done. Patient furrowed brow, clamped jaw down, and started moaning. Nurse to give scheduled Dilaudid at this time. Patient required 1 dose of Robinul 0.4mg and 1 prn of Dilaudid 1mg on night shift. She continues to require inpatient hospice for skilled nursing assessment, mediation titration, and injectable medication administration. Called granddaughter Sophia with an update. Got VM which was full.                   Discharge Codes    No documentation.                 Expected Discharge Date and Time       Expected Discharge Date Expected Discharge Time    Apr 20, 2024               Barbra Melchor RN

## 2024-04-24 NOTE — PLAN OF CARE
Goal Outcome Evaluation:  Plan of Care Reviewed With: patient        Progress: declining  Outcome Evaluation: Pt appears comfortable. PRN robinul, and diluadid given. F/C patent, UOP minimal. Oral care completed several times throughout the night r/t bleeding cuts inside the mouth. Will continue hospice care.

## 2024-04-24 NOTE — PROGRESS NOTES
Hospice Progress Note    Patient Name: Luz Beasley   : 1940  Gender: female    Code Status: comfort measures    Date of Admission: 2024    Subjective:  Luz Beasley is a 83 y.o. female admitted to inpatient hospice 2024 with diagnosis of Sepsis [A41.9]  for symptom management.     Son at bedside today.  Pt appears comfortable on assessment.  No s/s of pain or distress noted.     - Scheduled:  Dilaudid 1 mg q 4 hrs   Versed 1 mg q 6 hrs   Lasix 20 mg q 8     - PRNs:  Robinul 0.4 mg x 1   Dilaudid 1 mg x 1         - Intake/Output  Intake & Output (last 3 days)          07 07 07 07 07 07 0701   0700    P.O.    0    Total Intake    0    Urine  25 100     Total Output  25 100     Net  -25 -100 0                       ROS:  Review of Systems   Unable to perform ROS: Acuity of condition       Reviewed current scheduled and prn medications for route, type, dose and frequency.       Biotene dry mouth **AND** hydrogen peroxide **AND** mineral oil    bisacodyl    furosemide    glycopyrrolate    HYDROmorphone    Lidocaine Viscous HCl    midazolam    polyvinyl alcohol    Scopolamine    Objective:   Pulse 81   Temp 96.8 °F (36 °C) (Bladder)   Resp 22   LMP  (LMP Unknown)   SpO2 97%      PPS: Palliative Performance Scale score as of 2024, 15:58 EDT is 10% based on the following measures:   Ambulation: Totally bed bound  Activity and Evidence of Disease: Unable to do any work, extensive evidence of disease  Self-Care: Total care  Intake:  Mouth care only  LOC: Drowsy or coma     Physical Exam:  Physical Exam  Vitals and nursing note reviewed.   Constitutional:       Appearance: She is ill-appearing.   HENT:      Head: Normocephalic.      Mouth/Throat:      Mouth: Mucous membranes are dry.      Pharynx: Oropharyngeal exudate present.      Comments: Bloody exudate on teeth and buccal mucosa.   Cardiovascular:      Rate and Rhythm: Normal rate.    Pulmonary:      Effort: Pulmonary effort is normal.      Breath sounds: Wheezing and rales present.   Abdominal:      General: Bowel sounds are normal. There is no distension.      Tenderness: There is no abdominal tenderness. There is no guarding.   Musculoskeletal:         General: No swelling.      Comments: Contracted BLE   Neurological:      Mental Status: She is unresponsive.             Sepsis      Assessment/Plan:   Luz Beasley is a 83 y.o. female admitted to inpatient hospice 04/20/2024 with diagnosis of Sepsis [A41.9]  for symptom management.     Symptoms:  Pain   Dyspnea   Terminal restlessness   Terminal secretions      Discharge Disposition: EOLC     -Add scheduled lasix 20 mg q 8 hrs for terminal secretions   -Add viscous lidocaine q 8 hrs scheduled for pt comfort, oral lesions present.       Total Visit Time: 20 min   Face to Face Time: 10 min     Justification for care:  Patient meets criteria for acute in-patient care due to need for frequent skilled nursing assessments to determine patient comfort and medication effectiveness at end of life.  Frequent adjustments to medications and interventions for symptom management, including injectable medications.      Linda Hernandez, MSN, APRN  [facility]  Hospice and Palliative Care Nurse Practitioner  04/25/24  15:58 EDT

## 2024-04-24 NOTE — PLAN OF CARE
Goal Outcome Evaluation:Continues oral bleeding, now with some vaginal bleeding.  No PRN doses of medications needed. Tay shows minimal amount of urine output with bloody sediment. Continue to monitor, turn and medicate as needed.

## 2024-04-25 LAB
BACTERIA ISLT: NORMAL
BACTERIA SPEC AEROBE CULT: ABNORMAL
GRAM STN SPEC: ABNORMAL
ISOLATED FROM: ABNORMAL

## 2024-04-25 NOTE — PROGRESS NOTES
Hospice Progress Note    Patient Name: Luz Beasley   : 1940  Gender: female    Code Status: comfort measures    Date of Admission: 2024    Subjective:  Luz Beasley is a 83 y.o. female admitted to inpatient hospice 2024 with diagnosis of Sepsis [A41.9]  for symptom management.     Son at bedside today.  Audible terminal secretions present today.  Mottled on BLE.  Last BM noted 24.    - Scheduled:  Dilaudid 1 mg q 4 hrs   Versed 1 mg q 6 hrs   Lasix 20 mg q 8     - PRNs:  Dilaudid 1 mg x 2         - Intake/Output  Intake & Output (last 3 days)          0701   0700  0701   07 0701   07 0701   0700    P.O.    0    Total Intake    0    Urine  25 100     Total Output  25 100     Net  -25 -100 0                       ROS:  Review of Systems   Unable to perform ROS: Acuity of condition       Reviewed current scheduled and prn medications for route, type, dose and frequency.       Biotene dry mouth **AND** hydrogen peroxide **AND** mineral oil    bisacodyl    furosemide    glycopyrrolate    HYDROmorphone    Lidocaine Viscous HCl    midazolam    polyvinyl alcohol    Scopolamine    Objective:   Pulse 81   Temp 96.8 °F (36 °C) (Bladder)   Resp 22   LMP  (LMP Unknown)   SpO2 97%      PPS: Palliative Performance Scale score as of 2024, 16:01 EDT is 10% based on the following measures:   Ambulation: Totally bed bound  Activity and Evidence of Disease: Unable to do any work, extensive evidence of disease  Self-Care: Total care  Intake:  Mouth care only  LOC: Drowsy or coma     Physical Exam:  Physical Exam  Vitals and nursing note reviewed.   Constitutional:       Appearance: She is ill-appearing.   HENT:      Head: Normocephalic.      Mouth/Throat:      Mouth: Mucous membranes are dry.      Pharynx: Oropharyngeal exudate present.      Comments: Bloody exudate on teeth and buccal mucosa.   Cardiovascular:      Rate and Rhythm: Normal rate.   Pulmonary:       Effort: Pulmonary effort is normal.      Breath sounds: Wheezing and rales present.      Comments: Audible terminal secretions present.   Abdominal:      General: Bowel sounds are normal. There is no distension.      Tenderness: There is no abdominal tenderness. There is no guarding.   Musculoskeletal:         General: No swelling.      Comments: Contracted BLE   Neurological:      Mental Status: She is unresponsive.             Sepsis      Assessment/Plan:   Luz Beasley is a 83 y.o. female admitted to inpatient hospice 04/20/2024 with diagnosis of Sepsis [A41.9]  for symptom management.     Symptoms:  Pain   Dyspnea   Terminal restlessness   Terminal secretions      Discharge Disposition: EOLC     -Dulcolax suppository daily x 3 days.  Last BM noted 4/20/24.   -RHC tomorrow if no change overnight.        Total Visit Time: 20 min   Face to Face Time: 10 min     Justification for care:  Patient meets criteria for acute in-patient care due to need for frequent skilled nursing assessments to determine patient comfort and medication effectiveness at end of life.  Frequent adjustments to medications and interventions for symptom management, including injectable medications.      Linda Hernandez, MSN, APRN  [facility] Care Nav[facility]ators  Hospice and Palliative Care Nurse Practitioner  04/25/24  16:01 EDT

## 2024-04-25 NOTE — PLAN OF CARE
Goal Outcome Evaluation:                                          Patient unresponsive and unable to follow commands. Faint pulses. PRN required. Tay patent. Family at bedside at beginning of shift. Oral care conducted. Comfort measures ongoing

## 2024-04-25 NOTE — PROGRESS NOTES
Continued Stay Note  [facility]     Patient Name: Luz Beasley  MRN: [id number]  Today's Date: 4/25/2024    Admit Date: 4/20/2024    Plan: Inpatient Hospice   Discharge Plan       Row Name 04/25/24 1121       Plan    Plan Inpatient Hospice    Plan Comments 10% PPS. Chart reviewed. Bedside assessment completed. No visitors/family at bedside. Patient noted with relaxed jaw and body posture. Breathing unlabored with apneic periods noted. Patient has requires 2 doses of PRN Dilaudid in the past 24 hours. Continues with scheduled Dilaudid and Versed. Care coordinated with [facility] staff. Call placed to [facility] for update with no answer and voicemail full. Unable to leave a message. Patient continues to require inpatient hospice services for skilled nursing assessment, medication titration, and injectable medication administration for palliation of symptoms. Discharge is dependent on survival of this hospitalization and becoming appropriate for a lower level of care.                   Discharge Codes    No documentation.                 Expected Discharge Date and Time       Expected Discharge Date Expected Discharge Time    Apr 20, 2024               Ciara Desai RN

## 2024-04-26 NOTE — DISCHARGE SUMMARY
Date of Admission: 04/20/2024   Date and Time of Death: 4/25/2024 at 11:30 PM    Hospital Course:  Luz Beasley is a 83 y.o. female admitted to inpatient hospice with diagnosis of Sepsis [A41.9]  for symptom management. Medications were available throughout admission for symptom management and comfort. Patient continued to decline after admission as expected ultimately to their death on 4/25/2024 at 11:30 PM. Patient was pronounced dead by Clinical House Supervisor. Spiritual and psychosocial support were available to patient and family throughout admission. Patient's remains were released per [facility] protocol.       Linda Hernandez, MSN, APRN, ACHPN  [facility] Nav[facility]ators  Hospice and Palliative Care Nurse Practitioner  04/26/24  09:52 EDT

## 2024-04-26 NOTE — SIGNIFICANT NOTE
Exam confirms with auscultation zero audible heart tones and zero audible respirations. Ms.Mary SYLVIE Beasley was pronounced dead at 2330.  MD notified by Patient's RN.    Diana Santos RN  Clinical House Supervisor  4/26/2024 00:05 EDT

## 2024-04-26 NOTE — NURSING NOTE
Spoke with hospice serjio Dana' who stated she would come in per family request. Roughly 40 minute commute per Francy.

## 2024-04-26 NOTE — NURSING NOTE
"Notified Sophia (legal guardian) by telephone to notify of patient passing.  Sophia stated she would be coming in to the hospital to pay her respects.   home verified with Sophia as \"[facility]\" [place].    "

## 2024-07-16 NOTE — ED NOTES
Date of Discharge:  7/16/2024    PCP: Meche Ji MD    Discharge Diagnosis:   Active Hospital Problems    Diagnosis  POA    **Right ureteral stone [N20.1]  Yes    Severe malnutrition [E43]  Yes    Hypercalcemia [E83.52]  No    Constipation [K59.00]  Clinically Undetermined    Acute UTI (urinary tract infection) [N39.0]  Yes    Hypoglycemia [E16.2]  Yes    Abnormal LFTs [R79.89]  Yes    Chronic bilateral low back pain without sciatica [M54.50, G89.29]  Yes    CAYLA (dementia of Alzheimer type) [G30.9, F02.80]  Yes    Lumbar spinal stenosis [M48.061]  Yes    Thrombocytopenia [D69.6]  Yes    Acute low back pain [M54.50]  Yes      Resolved Hospital Problems    Diagnosis Date Resolved POA    Shock, septic [A41.9, R65.21] 06/03/2024 Yes    PRINCE (acute kidney injury) [N17.9] 06/06/2024 Yes     Procedure(s):  CYSTOSCOPY, RIGHT URETERAL STENT PLACEMENT     Consults       Date and Time Order Name Status Description    6/23/2024 12:45 PM Inpatient Nephrology Consult Completed     6/3/2024  2:51 PM Inpatient Infectious Diseases Consult Completed     6/2/2024  7:49 AM Inpatient Consult to Intensivist Completed     6/2/2024  5:03 AM Inpatient Neurosurgery Consult Completed     6/2/2024  2:17 AM Urology (on-call MD unless specified) Completed           Hospital Course  85 y.o. female was initially going to for complaints of back pain and sent from a nursing home as was previously followed by pain management with epidurals prior to admission.  She was seen in consultation by numerous consultants ranging from urology, pulmonology, urology, infectious disease, nephrology and neurosurgery.  All consultant input was greatly appreciated.  ESBL E. coli pyelonephritis complicated with right hydronephrosis and obstructive ureteric stone with subsequent sepsis present on admission is now status post cystoscopy with right stent placement on 6/2/2024.  Rodriguez catheter has been removed as patient has passed voiding trial.  We completed  Patient leaving ED under care of [facility] at this time. NADN at time of transport. VSS. Oxygenation maintained on room air and pt tolerating well. Respirations even, unlabored. Skin PWD. No needs or concerns voiced.      Patt Marin RN  07/05/20 5437     Invanz per ID recommendations.  Urology does plan on outpatient follow-up ureteroscopy and laser lithotripsy at a later date.  Nephrology assisted with hypercalcemia which is likely due to hyperparathyroidism stabilizing with Sensipar.  Her kidney function is further complicated by CKD stage II and she is at baseline by the time of discharge with a creatinine level of 1.1 with a BUN of 18 and a potassium of 3.9.  Her sodium is normal at 139 as is her CO2 at 23.  P.o. intake has been encouraged and previous ARF has been resolved as # septic shock with repeat blood culture showing no growth to date.  All of the above is further compounded by dementia with no significant behavior disorder managed on Aricept and Namenda.  This patient's hospitalization has been complicated by an accurate information on this patient as insurance companies thought that she had passed away but that was not the case.  CCP is coordinated discharge needs with family and all involved are agreeable and comfortable with the idea of transition to SNF as precertification has been obtained to [facility].  This patient is more than medically stable to be transition at this time his transport has been lined up for later this afternoon.      Temp:  [97.4 °F (36.3 °C)-97.9 °F (36.6 °C)] 97.6 °F (36.4 °C)  Heart Rate:  [67-87] 73  Resp:  [16] 16  BP: ()/(57-78) 119/57  Body mass index is 28.73 kg/m².    Physical Exam  Constitutional:       Comments: Elderly and frail.  Advanced dementia.  Sitting up in bedside chair conversational and pleasantly demented.  No family present at that time   HENT:      Head: Normocephalic.      Nose: Nose normal.      Mouth/Throat:      Mouth: Mucous membranes are moist.      Pharynx: Oropharynx is clear.   Eyes:      Extraocular Movements: Extraocular movements intact.      Pupils: Pupils are equal, round, and reactive to light.   Cardiovascular:      Rate and Rhythm: Normal rate and regular rhythm.   Pulmonary:       Effort: Pulmonary effort is normal. No respiratory distress.      Breath sounds: Normal breath sounds.   Abdominal:      General: Bowel sounds are normal. There is no distension.      Palpations: Abdomen is soft.      Tenderness: There is no abdominal tenderness.   Neurological:      Mental Status: She is alert.      Cranial Nerves: No cranial nerve deficit.      Motor: Weakness present.      Gait: Gait abnormal.       Disposition: Skilled Nursing Facility ([facility] - External)       Discharge Medications        New Medications        Instructions Start Date   cinacalcet 60 MG tablet  Commonly known as: SENSIPAR   60 mg, Oral, Daily With Breakfast             Changes to Medications        Instructions Start Date   acetaminophen 325 MG tablet  Commonly known as: TYLENOL  What changed:   when to take this  reasons to take this   650 mg, Oral, Every 4 Hours PRN             Continue These Medications        Instructions Start Date   cholecalciferol 25 MCG (1000 UT) tablet  Commonly known as: VITAMIN D3   2,000 Units, Oral, Daily      donepezil 10 MG tablet  Commonly known as: ARICEPT   1 tablet, Oral, Daily      memantine 10 MG tablet  Commonly known as: NAMENDA   10 mg, Oral, 2 Times Daily      MULTI COMPLETE PO   Oral, Daily             Stop These Medications      amLODIPine 2.5 MG tablet  Commonly known as: NORVASC     meloxicam 15 MG tablet  Commonly known as: MOBIC     rosuvastatin 20 MG tablet  Commonly known as: CRESTOR             Diet Instructions       Diet: Regular/House Diet; Regular (IDDSI 7); Thin (IDDSI 0)      Discharge Diet: Regular/House Diet    Texture: Regular (IDDSI 7)    Fluid Consistency: Thin (IDDSI 0)           Activity Instructions       Activity as Tolerated             Additional Instructions for the Follow-ups that You Need to Schedule       Discharge Follow-up with PCP   As directed       Currently Documented PCP:    Meche Ji MD    PCP Phone Number:    [phone]     Follow  Up Details: 3-4 weeks        Discharge Follow-up with Specified Provider: Urology; 2 Weeks   As directed      To: Urology   Follow Up: 2 Weeks   Follow Up Details: outpatient for scheduled ureteroscopy, laser llithotripsy after discharge               Contact information for follow-up providers       Meche Ji MD .    Specialty: Internal Medicine  Why: 3-4 weeks  Contact information:  [address] [id number] [phone]                       Contact information for after-discharge care       Destination       [facility] .    Service: Skilled Nursing  Contact information:  [address] [facility] [address] [phone]                                No future appointments.     Jim Alanis MD  [facility]  07/16/24    Discharge time spent greater than 30 minutes.

## 2025-06-27 NOTE — ED PROVIDER NOTES
Subjective   Per the nursing home patient is having abdominal pain and possibly a bowel obstruction. Pt is not having vomiting or diarrhea here.      History provided by:  Nursing home   used: No    Abdominal Pain  Pain location:  Generalized  Pain radiates to:  Does not radiate  Pain severity:  Mild  Onset quality:  Sudden  Duration:  1 day  Timing:  Unable to specify  Progression:  Unable to specify  Relieved by:  None tried  Worsened by:  Nothing  Ineffective treatments:  None tried  Risk factors: being elderly and obesity        Review of Systems   Unable to perform ROS: Dementia   Gastrointestinal: Positive for abdominal pain.   All other systems reviewed and are negative.      Past Medical History:   Diagnosis Date   • Dementia (HCC)    • Depression    • Diabetes mellitus (HCC)    • Hyperlipidemia    • Hypertension    • Obesity    • Senile dementia (HCC)    • Vitamin D deficiency disease        No Known Allergies    Past Surgical History:   Procedure Laterality Date   • CARDIAC CATHETERIZATION  05/05/2006   • CARDIOVASCULAR STRESS TEST  05/05/2006   • COLONOSCOPY  03/21/2008   • HYSTERECTOMY     • THYROID SURGERY         Family History   Family history unknown: Yes       Social History     Socioeconomic History   • Marital status:    Tobacco Use   • Smoking status: Never Smoker   • Smokeless tobacco: Never Used   Substance and Sexual Activity   • Alcohol use: No   • Drug use: No   • Sexual activity: Defer           Objective   Physical Exam  Vitals and nursing note reviewed.   Constitutional:       Appearance: She is well-developed.   HENT:      Head: Normocephalic.   Cardiovascular:      Rate and Rhythm: Normal rate and regular rhythm.   Pulmonary:      Effort: Pulmonary effort is normal.      Breath sounds: Normal breath sounds.   Abdominal:      General: Bowel sounds are normal.      Palpations: Abdomen is soft.      Tenderness: There is no abdominal tenderness.   Musculoskeletal:  Sw spoke to pt's daughter this am. She did request [facility] to do eval of pt if she is not considered GIP appropriate.  [facility] did eval and not appropriate for GIP at this time.  Brtitany spoke to Nazia from [facility] [phone].  She will be out to eval pt today for  hospice at [facility].    Shanita Parker LCSW  /Discharge Planner             General: Normal range of motion.      Cervical back: Neck supple.   Skin:     General: Skin is warm and dry.   Neurological:      Mental Status: She is alert. She is disoriented and confused.   Psychiatric:         Behavior: Behavior is agitated.         Cognition and Memory: Cognition is impaired. Memory is impaired.         Judgment: Judgment is impulsive and inappropriate.         Procedures           ED Course  ED Course as of 12/17/21 1643   Fri Dec 17, 2021   0041 Discussed with , pt has pyelonephritis and admit to hospitalist and he will consult  [LC]   0057 There is no beds at the hospital currently.  Hospitals do not take any patients at this time. [LC]   0208 CT Abdomen Pelvis Without Contrast  IMPRESSION:     1. Stranding around the urinary bladder concerning for cystitis.  2. Large stone in the right renal pelvis near the UPJ minimal right hydronephrosis. This could be due to intermittent obstruction. There is some stranding around the right kidney which could be due to obstruction or pyelonephritis. No other stones are  seen.  3. No bowel obstruction or acute GI tract inflammation.  4. Probable tiny gallstone in an otherwise normal-appearing gallbladder.  5. Hysterectomy.    [MB]   0630 Patient resting comfortably. Denies any complaints/concerns. Awaiting for a bed to open up. [MB]   1445 Beds have become available, will discuss with hospitalist service []   1628 I discussed with Dr. Pablo and Dr. Willson did see the patient in consult this morning.  Urology advised the patient does not require a ureteral stent at this time.  They recommended treating the pyelonephritis and she can follow-up with them outpatient.  The patient does not meet sepsis criteria.  She has not required any pain medication since last night.  Her vitals have all been stable.  At this time she could be discharged back to the nursing home to treat her pyelonephritis with oral antibiotics.  Dr. Pablo and DARIUS  both agree with this plan.  The patient has been stable throughout her ED stay.  She will follow-up outpatient and will return to the ED if her symptoms change or worsen. [AH]      ED Course User Index  [AH] Shahida Almaraz PA  [LC] Kate Castle PA  [MB] Karen Stovall APRN                                                 MDM  Number of Diagnoses or Management Options     Amount and/or Complexity of Data Reviewed  Clinical lab tests: reviewed  Tests in the radiology section of CPT®: reviewed  Decide to obtain previous medical records or to obtain history from someone other than the patient: yes  Discuss the patient with other providers: yes    Patient Progress  Patient progress: improved      Final diagnoses:   Pyelonephritis   Kidney stone       ED Disposition  ED Disposition     ED Disposition Condition Comment    Discharge Stable           Maricarmen De La Vega MD  [address]  [id number]  [place] [id number]  [phone]    Schedule an appointment as soon as possible for a visit in 3 days      Yusef Willson MD  [address]  [address]  [place] [id number]  [phone]    Schedule an appointment as soon as possible for a visit in 1 week           Medication List      New Prescriptions    cefdinir 300 MG capsule  Commonly known as: OMNICEF  Take 1 capsule by mouth 2 (Two) Times a Day for 10 days.           Where to Get Your Medications      You can get these medications from any pharmacy    Bring a paper prescription for each of these medications  · cefdinir 300 MG capsule          Shahida Almaraz PA  12/17/21 5798

## (undated) DEVICE — TBG FEED PULL FLOW20 NO/DRUG 20F 4.47MM 150CM

## (undated) DEVICE — Device: Brand: DEFENDO AIR/WATER/SUCTION AND BIOPSY VALVE

## (undated) DEVICE — TBG SXN CONN/M 1/4IN 20FT LF STRL

## (undated) DEVICE — KT VLV BIOP DEFENDO SXN AIR/WATER

## (undated) DEVICE — SYR LUERLOK 5CC

## (undated) DEVICE — KT PEG ENDOVIVE ENFIT SFTY PULL 20F 1P/U

## (undated) DEVICE — THE BITE BLOCK MAXI, LATEX FREE STRAP IS USED TO PROTECT THE ENDOSCOPE INSERTION TUBE FROM BEING BITTEN BY THE PATIENT.

## (undated) DEVICE — BNDR ABD 4PANEL 12IN 46 TO 62IN

## (undated) DEVICE — NDL HYPO ECLPS SFTY 25G 1 1/2IN

## (undated) DEVICE — Device

## (undated) DEVICE — MANIFLD WAST MGMT SYS NEPTUNE2 1PT

## (undated) DEVICE — SINGLE PORT MANIFOLD: Brand: NEPTUNE 2

## (undated) DEVICE — BITEBLOCK MAXIBITE W STRAP 60FLF

## (undated) DEVICE — TUBING, SUCTION, 1/4" X 20', STRAIGHT: Brand: MEDLINE INDUSTRIES, INC.